# Patient Record
Sex: MALE | Race: WHITE | Employment: UNEMPLOYED | URBAN - METROPOLITAN AREA
[De-identification: names, ages, dates, MRNs, and addresses within clinical notes are randomized per-mention and may not be internally consistent; named-entity substitution may affect disease eponyms.]

---

## 2018-10-04 ENCOUNTER — HOSPITAL ENCOUNTER (EMERGENCY)
Age: 33
Discharge: PSYCHIATRIC OR SUBSTANCE USE FACILITY | End: 2018-10-04
Attending: EMERGENCY MEDICINE
Payer: MEDICARE

## 2018-10-04 VITALS
WEIGHT: 207 LBS | RESPIRATION RATE: 16 BRPM | SYSTOLIC BLOOD PRESSURE: 122 MMHG | BODY MASS INDEX: 26.56 KG/M2 | OXYGEN SATURATION: 98 % | HEART RATE: 73 BPM | HEIGHT: 74 IN | TEMPERATURE: 98.7 F | DIASTOLIC BLOOD PRESSURE: 86 MMHG

## 2018-10-04 DIAGNOSIS — R45.851 SUICIDAL IDEATION: Primary | ICD-10-CM

## 2018-10-04 LAB
ACETAMINOPHEN LEVEL: <5 UG/ML (ref 15–30)
ALBUMIN SERPL-MCNC: 4.4 GM/DL (ref 3.4–5)
ALCOHOL SCREEN SERUM: <0.01 %WT/VOL
ALP BLD-CCNC: 78 IU/L (ref 40–129)
ALT SERPL-CCNC: 18 U/L (ref 10–40)
AMPHETAMINES: NEGATIVE
ANION GAP SERPL CALCULATED.3IONS-SCNC: 12 MMOL/L (ref 4–16)
AST SERPL-CCNC: 14 IU/L (ref 15–37)
BACTERIA: NEGATIVE /HPF
BARBITURATE SCREEN URINE: NEGATIVE
BASOPHILS ABSOLUTE: 0 K/CU MM
BASOPHILS RELATIVE PERCENT: 0.7 % (ref 0–1)
BENZODIAZEPINE SCREEN, URINE: NEGATIVE
BILIRUB SERPL-MCNC: 0.6 MG/DL (ref 0–1)
BILIRUBIN URINE: NEGATIVE MG/DL
BLOOD, URINE: NEGATIVE
BUN BLDV-MCNC: 18 MG/DL (ref 6–23)
CALCIUM SERPL-MCNC: 9.3 MG/DL (ref 8.3–10.6)
CANNABINOID SCREEN URINE: NEGATIVE
CHLORIDE BLD-SCNC: 103 MMOL/L (ref 99–110)
CLARITY: CLEAR
CO2: 23 MMOL/L (ref 21–32)
COCAINE METABOLITE: NEGATIVE
COLOR: YELLOW
CREAT SERPL-MCNC: 1 MG/DL (ref 0.9–1.3)
DIFFERENTIAL TYPE: ABNORMAL
EOSINOPHILS ABSOLUTE: 0.1 K/CU MM
EOSINOPHILS RELATIVE PERCENT: 1.4 % (ref 0–3)
GFR AFRICAN AMERICAN: >60 ML/MIN/1.73M2
GFR NON-AFRICAN AMERICAN: >60 ML/MIN/1.73M2
GLUCOSE BLD-MCNC: 88 MG/DL (ref 70–99)
GLUCOSE, URINE: NEGATIVE MG/DL
HCT VFR BLD CALC: 48.5 % (ref 42–52)
HEMOGLOBIN: 17.1 GM/DL (ref 13.5–18)
IMMATURE NEUTROPHIL %: 0.2 % (ref 0–0.43)
KETONES, URINE: NEGATIVE MG/DL
LEUKOCYTE ESTERASE, URINE: NEGATIVE
LYMPHOCYTES ABSOLUTE: 1.8 K/CU MM
LYMPHOCYTES RELATIVE PERCENT: 31.3 % (ref 24–44)
MCH RBC QN AUTO: 32.1 PG (ref 27–31)
MCHC RBC AUTO-ENTMCNC: 35.3 % (ref 32–36)
MCV RBC AUTO: 91.2 FL (ref 78–100)
MONOCYTES ABSOLUTE: 0.6 K/CU MM
MONOCYTES RELATIVE PERCENT: 10.8 % (ref 0–4)
MUCUS: ABNORMAL HPF
NITRITE URINE, QUANTITATIVE: NEGATIVE
NUCLEATED RBC %: 0 %
OPIATES, URINE: NEGATIVE
OXYCODONE: NEGATIVE
PDW BLD-RTO: 11.4 % (ref 11.7–14.9)
PH, URINE: 5 (ref 5–8)
PHENCYCLIDINE, URINE: NEGATIVE
PLATELET # BLD: 294 K/CU MM (ref 140–440)
PMV BLD AUTO: 9.5 FL (ref 7.5–11.1)
POTASSIUM SERPL-SCNC: 4.3 MMOL/L (ref 3.5–5.1)
PROTEIN UA: NEGATIVE MG/DL
RBC # BLD: 5.32 M/CU MM (ref 4.6–6.2)
RBC URINE: 1 /HPF (ref 0–3)
SALICYLATE LEVEL: <0.3 MG/DL (ref 15–30)
SEGMENTED NEUTROPHILS ABSOLUTE COUNT: 3.2 K/CU MM
SEGMENTED NEUTROPHILS RELATIVE PERCENT: 55.6 % (ref 36–66)
SODIUM BLD-SCNC: 138 MMOL/L (ref 135–145)
SPECIFIC GRAVITY UA: 1.03 (ref 1–1.03)
SQUAMOUS EPITHELIAL: <1 /HPF
TOTAL IMMATURE NEUTOROPHIL: 0.01 K/CU MM
TOTAL NUCLEATED RBC: 0 K/CU MM
TOTAL PROTEIN: 6.7 GM/DL (ref 6.4–8.2)
TRICHOMONAS: ABNORMAL /HPF
UROBILINOGEN, URINE: NORMAL MG/DL (ref 0.2–1)
WBC # BLD: 5.7 K/CU MM (ref 4–10.5)
WBC UA: <1 /HPF (ref 0–2)

## 2018-10-04 PROCEDURE — 83721 ASSAY OF BLOOD LIPOPROTEIN: CPT

## 2018-10-04 PROCEDURE — G0480 DRUG TEST DEF 1-7 CLASSES: HCPCS

## 2018-10-04 PROCEDURE — 80061 LIPID PANEL: CPT

## 2018-10-04 PROCEDURE — 36415 COLL VENOUS BLD VENIPUNCTURE: CPT

## 2018-10-04 PROCEDURE — 81001 URINALYSIS AUTO W/SCOPE: CPT

## 2018-10-04 PROCEDURE — 80307 DRUG TEST PRSMV CHEM ANLYZR: CPT

## 2018-10-04 PROCEDURE — 85025 COMPLETE CBC W/AUTO DIFF WBC: CPT

## 2018-10-04 PROCEDURE — 80053 COMPREHEN METABOLIC PANEL: CPT

## 2018-10-04 PROCEDURE — 80320 DRUG SCREEN QUANTALCOHOLS: CPT

## 2018-10-04 PROCEDURE — 99285 EMERGENCY DEPT VISIT HI MDM: CPT

## 2018-10-06 LAB
CHOLESTEROL: 175 MG/DL
HDLC SERPL-MCNC: 31 MG/DL
LDL CHOLESTEROL DIRECT: 89 MG/DL
TRIGL SERPL-MCNC: 308 MG/DL

## 2018-11-11 ENCOUNTER — HOSPITAL ENCOUNTER (EMERGENCY)
Age: 33
Discharge: HOME | End: 2018-11-11
Payer: MEDICARE

## 2018-11-11 ENCOUNTER — APPOINTMENT (OUTPATIENT)
Dept: GENERAL RADIOLOGY | Age: 33
End: 2018-11-11
Payer: MEDICARE

## 2018-11-11 VITALS
TEMPERATURE: 98.6 F | RESPIRATION RATE: 17 BRPM | BODY MASS INDEX: 26.95 KG/M2 | HEART RATE: 64 BPM | OXYGEN SATURATION: 96 % | HEIGHT: 74 IN | DIASTOLIC BLOOD PRESSURE: 68 MMHG | WEIGHT: 210 LBS | SYSTOLIC BLOOD PRESSURE: 123 MMHG

## 2018-11-11 DIAGNOSIS — S69.91XA INJURY OF RIGHT WRIST, INITIAL ENCOUNTER: Primary | ICD-10-CM

## 2018-11-11 PROCEDURE — 6370000000 HC RX 637 (ALT 250 FOR IP): Performed by: PHYSICIAN ASSISTANT

## 2018-11-11 PROCEDURE — 73110 X-RAY EXAM OF WRIST: CPT

## 2018-11-11 PROCEDURE — 99283 EMERGENCY DEPT VISIT LOW MDM: CPT

## 2018-11-11 PROCEDURE — L3809 WHFO W/O JOINTS PRE OTS: HCPCS

## 2018-11-11 PROCEDURE — 73130 X-RAY EXAM OF HAND: CPT

## 2018-11-11 RX ORDER — NAPROXEN 250 MG/1
500 TABLET ORAL ONCE
Status: COMPLETED | OUTPATIENT
Start: 2018-11-11 | End: 2018-11-11

## 2018-11-11 RX ORDER — NAPROXEN 500 MG/1
500 TABLET ORAL 2 TIMES DAILY PRN
Qty: 30 TABLET | Refills: 0 | Status: SHIPPED | OUTPATIENT
Start: 2018-11-11 | End: 2019-03-26

## 2018-11-11 RX ADMIN — NAPROXEN 500 MG: 250 TABLET ORAL at 11:15

## 2018-11-11 ASSESSMENT — PAIN SCALES - GENERAL: PAINLEVEL_OUTOF10: 6

## 2018-11-11 ASSESSMENT — PAIN DESCRIPTION - LOCATION: LOCATION: WRIST

## 2018-11-11 ASSESSMENT — PAIN DESCRIPTION - ORIENTATION: ORIENTATION: RIGHT

## 2018-11-11 NOTE — ED PROVIDER NOTES
and spelling, as well as words and phrases that may be inappropriate. If there are any questions or concerns please feel free to contact the dictating provider for clarification.        Percy Bernstein PA-C  11/11/18 0964

## 2018-11-27 ENCOUNTER — HOSPITAL ENCOUNTER (OUTPATIENT)
Age: 33
Setting detail: SPECIMEN
Discharge: HOME | End: 2018-11-27
Payer: MEDICARE

## 2018-11-27 LAB
ALBUMIN SERPL-MCNC: 3.9 GM/DL (ref 3.4–5)
ALP BLD-CCNC: 61 IU/L (ref 40–128)
ALT SERPL-CCNC: 38 U/L (ref 10–40)
AMPHETAMINES: NEGATIVE
ANION GAP SERPL CALCULATED.3IONS-SCNC: 11 MMOL/L (ref 4–16)
AST SERPL-CCNC: 22 IU/L (ref 15–37)
BARBITURATE SCREEN URINE: NEGATIVE
BASOPHILS ABSOLUTE: 0 K/CU MM
BASOPHILS RELATIVE PERCENT: 0.5 % (ref 0–1)
BENZODIAZEPINE SCREEN, URINE: NEGATIVE
BILIRUB SERPL-MCNC: 0.8 MG/DL (ref 0–1)
BUN BLDV-MCNC: 15 MG/DL (ref 6–23)
CALCIUM SERPL-MCNC: 8.6 MG/DL (ref 8.3–10.6)
CANNABINOID SCREEN URINE: ABNORMAL
CHLORIDE BLD-SCNC: 104 MMOL/L (ref 99–110)
CO2: 24 MMOL/L (ref 21–32)
COCAINE METABOLITE: NEGATIVE
CREAT SERPL-MCNC: 1.1 MG/DL (ref 0.9–1.3)
DIFFERENTIAL TYPE: ABNORMAL
EOSINOPHILS ABSOLUTE: 0.1 K/CU MM
EOSINOPHILS RELATIVE PERCENT: 2.5 % (ref 0–3)
GFR AFRICAN AMERICAN: >60 ML/MIN/1.73M2
GFR NON-AFRICAN AMERICAN: >60 ML/MIN/1.73M2
GLUCOSE BLD-MCNC: 90 MG/DL (ref 70–99)
HCT VFR BLD CALC: 51 % (ref 42–52)
HEMOGLOBIN: 16.6 GM/DL (ref 13.5–18)
IMMATURE NEUTROPHIL %: 0.2 % (ref 0–0.43)
LYMPHOCYTES ABSOLUTE: 1.8 K/CU MM
LYMPHOCYTES RELATIVE PERCENT: 32.4 % (ref 24–44)
MCH RBC QN AUTO: 30.4 PG (ref 27–31)
MCHC RBC AUTO-ENTMCNC: 32.5 % (ref 32–36)
MCV RBC AUTO: 93.4 FL (ref 78–100)
MONOCYTES ABSOLUTE: 0.5 K/CU MM
MONOCYTES RELATIVE PERCENT: 9.6 % (ref 0–4)
NUCLEATED RBC %: 0 %
OPIATES, URINE: NEGATIVE
OXYCODONE: NEGATIVE
PDW BLD-RTO: 11.7 % (ref 11.7–14.9)
PHENCYCLIDINE, URINE: NEGATIVE
PLATELET # BLD: 287 K/CU MM (ref 140–440)
PMV BLD AUTO: 9.8 FL (ref 7.5–11.1)
POTASSIUM SERPL-SCNC: 4.6 MMOL/L (ref 3.5–5.1)
RBC # BLD: 5.46 M/CU MM (ref 4.6–6.2)
SEGMENTED NEUTROPHILS ABSOLUTE COUNT: 3.1 K/CU MM
SEGMENTED NEUTROPHILS RELATIVE PERCENT: 54.8 % (ref 36–66)
SODIUM BLD-SCNC: 139 MMOL/L (ref 135–145)
TOTAL IMMATURE NEUTOROPHIL: 0.01 K/CU MM
TOTAL NUCLEATED RBC: 0 K/CU MM
TOTAL PROTEIN: 5.7 GM/DL (ref 6.4–8.2)
WBC # BLD: 5.6 K/CU MM (ref 4–10.5)

## 2018-11-27 PROCEDURE — 85025 COMPLETE CBC W/AUTO DIFF WBC: CPT

## 2018-11-27 PROCEDURE — 36415 COLL VENOUS BLD VENIPUNCTURE: CPT

## 2018-11-27 PROCEDURE — 80053 COMPREHEN METABOLIC PANEL: CPT

## 2018-11-27 PROCEDURE — 80307 DRUG TEST PRSMV CHEM ANLYZR: CPT

## 2018-12-08 ENCOUNTER — HOSPITAL ENCOUNTER (EMERGENCY)
Age: 33
Discharge: HOME | End: 2018-12-08
Attending: EMERGENCY MEDICINE
Payer: MEDICARE

## 2018-12-08 ENCOUNTER — APPOINTMENT (OUTPATIENT)
Dept: GENERAL RADIOLOGY | Age: 33
End: 2018-12-08
Payer: MEDICARE

## 2018-12-08 VITALS
BODY MASS INDEX: 27.08 KG/M2 | WEIGHT: 211 LBS | HEIGHT: 74 IN | OXYGEN SATURATION: 99 % | DIASTOLIC BLOOD PRESSURE: 84 MMHG | TEMPERATURE: 98.1 F | SYSTOLIC BLOOD PRESSURE: 123 MMHG | RESPIRATION RATE: 16 BRPM | HEART RATE: 94 BPM

## 2018-12-08 DIAGNOSIS — R05.9 COUGH: ICD-10-CM

## 2018-12-08 DIAGNOSIS — J02.9 SORE THROAT: Primary | ICD-10-CM

## 2018-12-08 DIAGNOSIS — J06.9 UPPER RESPIRATORY TRACT INFECTION, UNSPECIFIED TYPE: ICD-10-CM

## 2018-12-08 PROCEDURE — 87430 STREP A AG IA: CPT

## 2018-12-08 PROCEDURE — 87081 CULTURE SCREEN ONLY: CPT

## 2018-12-08 PROCEDURE — 99283 EMERGENCY DEPT VISIT LOW MDM: CPT

## 2018-12-08 PROCEDURE — 71045 X-RAY EXAM CHEST 1 VIEW: CPT

## 2018-12-08 RX ORDER — AZITHROMYCIN 250 MG/1
TABLET, FILM COATED ORAL
Qty: 1 PACKET | Refills: 0 | Status: SHIPPED | OUTPATIENT
Start: 2018-12-08 | End: 2018-12-18

## 2018-12-08 RX ORDER — BENZONATATE 100 MG/1
100 CAPSULE ORAL 3 TIMES DAILY PRN
Qty: 10 CAPSULE | Refills: 0 | Status: SHIPPED | OUTPATIENT
Start: 2018-12-08 | End: 2018-12-15

## 2018-12-08 ASSESSMENT — ENCOUNTER SYMPTOMS
COUGH: 1
ALLERGIC/IMMUNOLOGIC NEGATIVE: 1
GASTROINTESTINAL NEGATIVE: 1
EYES NEGATIVE: 1
SORE THROAT: 1

## 2018-12-08 ASSESSMENT — PAIN SCALES - GENERAL: PAINLEVEL_OUTOF10: 4

## 2018-12-08 ASSESSMENT — PAIN DESCRIPTION - PAIN TYPE: TYPE: ACUTE PAIN

## 2018-12-08 ASSESSMENT — PAIN DESCRIPTION - LOCATION: LOCATION: THROAT

## 2018-12-08 NOTE — ED PROVIDER NOTES
[facility]      TRIAGE CHIEF COMPLAINT:   Pharyngitis      Chitina:  Castro Hdz is a 34 y.o. male that presents with complaint of sore throat, cough, congestion. Patient has been sick for the past couple days. History of strep throat he is at work and is worried about being contagious. He denies any fevers headache chest pain shortness of breath bowel pain nausea vomiting diarrhea. His wife is sick with similar symptoms. Denies any other questions or concerns no ear pain. Vaccines up-to-date. REVIEW OF SYSTEMS:  At least 10 systems reviewed and otherwise acutely negative except as in the [address] Review of Systems   Constitutional: Negative. HENT: Positive for congestion and sore throat. Eyes: Negative. Respiratory: Positive for cough. Cardiovascular: Negative. Gastrointestinal: Negative. Endocrine: Negative. Genitourinary: Negative. Musculoskeletal: Negative. Skin: Negative. Allergic/Immunologic: Negative. Neurological: Negative. Hematological: Negative. Psychiatric/Behavioral: Negative. All other systems reviewed and are negative. Past Medical History:   Diagnosis Date    Anxiety     Bipolar 2 disorder ([facility] [address]     Depression     Seizures ([facility] [address]      Past Surgical History:   Procedure Laterality Date    TONSILLECTOMY AND ADENOIDECTOMY      TYMPANOSTOMY TUBE PLACEMENT       History reviewed. No pertinent family history. Social History     Social History    Marital status:      Spouse name: N/A    Number of children: N/A    Years of education: N/A     Occupational History    Not on file.      Social History Main Topics    Smoking status: Former Smoker    Smokeless tobacco: Never Used    Alcohol use No    Drug use: Yes     Types: Marijuana    Sexual activity: Not on file     Other Topics Concern    Not on file     Social History Narrative    No narrative on file     No current facility-administered medications for He does not appear ill. No distress. HENT:   Head: Normocephalic and atraumatic. Right Ear: External ear normal.   Left Ear: External ear normal.   Mouth/Throat: Uvula is midline and mucous membranes are normal. No trismus in the jaw. No uvula swelling. Posterior oropharyngeal erythema present. No oropharyngeal exudate, posterior oropharyngeal edema or tonsillar abscesses. Eyes: Pupils are equal, round, and reactive to light. Conjunctivae and EOM are normal. Right eye exhibits no discharge. Left eye exhibits no discharge. No scleral icterus. Neck: Normal range of motion, full passive range of motion without pain and phonation normal. No JVD present. No neck rigidity. No edema, no erythema and normal range of motion present. Cardiovascular: Normal rate, regular rhythm, normal heart sounds and intact distal pulses. Exam reveals no gallop and no friction rub. No murmur heard. Pulmonary/Chest: Effort normal and breath sounds normal. No stridor. No respiratory distress. He has no wheezes. He has no rales. Abdominal: Soft. Bowel sounds are normal. He exhibits no distension and no mass. There is no tenderness. There is no rigidity, no rebound, no guarding, no tenderness at McBurney's point and negative Roche's sign. Musculoskeletal: Normal range of motion. He exhibits no edema, tenderness or deformity. Neurological: He is alert and oriented to person, place, and time. He has normal strength. He is not disoriented. He displays no atrophy and no tremor. No cranial nerve deficit or sensory deficit. He exhibits normal muscle tone. He displays no seizure activity. Coordination normal. GCS eye subscore is 4. GCS verbal subscore is 5. GCS motor subscore is 6. Skin: Skin is warm. No rash noted. He is not diaphoretic. No erythema. No pallor. Psychiatric: He has a normal mood and affect. His behavior is normal. Judgment and thought content normal.   Nursing note and vitals reviewed.         I have reviewed

## 2018-12-10 LAB
CULTURE: NORMAL
Lab: NORMAL
REPORT STATUS: NORMAL
SPECIMEN: NORMAL
STREP A DIRECT SCREEN: NEGATIVE

## 2019-03-26 ENCOUNTER — APPOINTMENT (OUTPATIENT)
Dept: GENERAL RADIOLOGY | Age: 34
End: 2019-03-26
Payer: MEDICARE

## 2019-03-26 ENCOUNTER — HOSPITAL ENCOUNTER (EMERGENCY)
Age: 34
Discharge: HOME | End: 2019-03-26
Payer: MEDICARE

## 2019-03-26 VITALS
BODY MASS INDEX: 28.49 KG/M2 | RESPIRATION RATE: 16 BRPM | TEMPERATURE: 97.9 F | SYSTOLIC BLOOD PRESSURE: 133 MMHG | DIASTOLIC BLOOD PRESSURE: 76 MMHG | OXYGEN SATURATION: 98 % | WEIGHT: 215 LBS | HEART RATE: 73 BPM | HEIGHT: 73 IN

## 2019-03-26 DIAGNOSIS — M54.16 LUMBAR BACK PAIN WITH RADICULOPATHY AFFECTING RIGHT LOWER EXTREMITY: Primary | ICD-10-CM

## 2019-03-26 PROCEDURE — 6360000002 HC RX W HCPCS: Performed by: PHYSICIAN ASSISTANT

## 2019-03-26 PROCEDURE — 96372 THER/PROPH/DIAG INJ SC/IM: CPT

## 2019-03-26 PROCEDURE — 99283 EMERGENCY DEPT VISIT LOW MDM: CPT

## 2019-03-26 PROCEDURE — 72100 X-RAY EXAM L-S SPINE 2/3 VWS: CPT

## 2019-03-26 PROCEDURE — 6370000000 HC RX 637 (ALT 250 FOR IP): Performed by: PHYSICIAN ASSISTANT

## 2019-03-26 RX ORDER — LIDOCAINE 50 MG/G
1 PATCH TOPICAL DAILY
Qty: 10 PATCH | Refills: 0 | Status: SHIPPED | OUTPATIENT
Start: 2019-03-26 | End: 2019-08-20

## 2019-03-26 RX ORDER — ORPHENADRINE CITRATE 30 MG/ML
60 INJECTION INTRAMUSCULAR; INTRAVENOUS ONCE
Status: COMPLETED | OUTPATIENT
Start: 2019-03-26 | End: 2019-03-26

## 2019-03-26 RX ORDER — CYCLOBENZAPRINE HCL 10 MG
10 TABLET ORAL 3 TIMES DAILY PRN
Qty: 15 TABLET | Refills: 0 | Status: SHIPPED | OUTPATIENT
Start: 2019-03-26 | End: 2019-04-05

## 2019-03-26 RX ORDER — LIDOCAINE 4 G/G
1 PATCH TOPICAL DAILY
Status: DISCONTINUED | OUTPATIENT
Start: 2019-03-26 | End: 2019-03-26 | Stop reason: HOSPADM

## 2019-03-26 RX ORDER — KETOROLAC TROMETHAMINE 30 MG/ML
30 INJECTION, SOLUTION INTRAMUSCULAR; INTRAVENOUS ONCE
Status: COMPLETED | OUTPATIENT
Start: 2019-03-26 | End: 2019-03-26

## 2019-03-26 RX ORDER — METHYLPREDNISOLONE 4 MG/1
TABLET ORAL
Qty: 21 TABLET | Refills: 0 | Status: SHIPPED | OUTPATIENT
Start: 2019-03-26 | End: 2019-08-20 | Stop reason: ALTCHOICE

## 2019-03-26 RX ORDER — NAPROXEN 500 MG/1
500 TABLET ORAL 2 TIMES DAILY PRN
Qty: 20 TABLET | Refills: 0 | Status: SHIPPED | OUTPATIENT
Start: 2019-03-26 | End: 2019-08-20 | Stop reason: ALTCHOICE

## 2019-03-26 RX ADMIN — ORPHENADRINE CITRATE 60 MG: 30 INJECTION INTRAMUSCULAR; INTRAVENOUS at 12:14

## 2019-03-26 RX ADMIN — KETOROLAC TROMETHAMINE 30 MG: 30 INJECTION, SOLUTION INTRAMUSCULAR; INTRAVENOUS at 12:14

## 2019-03-26 ASSESSMENT — PAIN DESCRIPTION - PAIN TYPE: TYPE: ACUTE PAIN

## 2019-03-26 ASSESSMENT — PAIN SCALES - GENERAL
PAINLEVEL_OUTOF10: 10
PAINLEVEL_OUTOF10: 10

## 2019-03-26 ASSESSMENT — PAIN DESCRIPTION - LOCATION: LOCATION: BACK

## 2019-05-10 ENCOUNTER — HOSPITAL ENCOUNTER (EMERGENCY)
Age: 34
Discharge: HOME | End: 2019-05-10
Payer: MEDICARE

## 2019-05-10 ENCOUNTER — APPOINTMENT (OUTPATIENT)
Dept: GENERAL RADIOLOGY | Age: 34
End: 2019-05-10
Payer: MEDICARE

## 2019-05-10 VITALS
OXYGEN SATURATION: 98 % | BODY MASS INDEX: 26.44 KG/M2 | HEART RATE: 58 BPM | TEMPERATURE: 97.5 F | RESPIRATION RATE: 16 BRPM | DIASTOLIC BLOOD PRESSURE: 77 MMHG | SYSTOLIC BLOOD PRESSURE: 120 MMHG | WEIGHT: 206 LBS | HEIGHT: 74 IN

## 2019-05-10 DIAGNOSIS — J06.9 VIRAL URI WITH COUGH: Primary | ICD-10-CM

## 2019-05-10 DIAGNOSIS — R11.0 NAUSEA: ICD-10-CM

## 2019-05-10 PROCEDURE — 71046 X-RAY EXAM CHEST 2 VIEWS: CPT

## 2019-05-10 PROCEDURE — 99283 EMERGENCY DEPT VISIT LOW MDM: CPT

## 2019-05-10 PROCEDURE — 6370000000 HC RX 637 (ALT 250 FOR IP): Performed by: PHYSICIAN ASSISTANT

## 2019-05-10 RX ORDER — ONDANSETRON 4 MG/1
4 TABLET, ORALLY DISINTEGRATING ORAL ONCE
Status: COMPLETED | OUTPATIENT
Start: 2019-05-10 | End: 2019-05-10

## 2019-05-10 RX ORDER — GUAIFENESIN/DEXTROMETHORPHAN 100-10MG/5
5 SYRUP ORAL 3 TIMES DAILY PRN
Qty: 120 ML | Refills: 0 | Status: SHIPPED | OUTPATIENT
Start: 2019-05-10 | End: 2019-05-20

## 2019-05-10 RX ORDER — ONDANSETRON 4 MG/1
4 TABLET, FILM COATED ORAL EVERY 8 HOURS PRN
Qty: 10 TABLET | Refills: 0 | Status: SHIPPED | OUTPATIENT
Start: 2019-05-10 | End: 2019-08-20 | Stop reason: ALTCHOICE

## 2019-05-10 RX ORDER — ONDANSETRON 2 MG/ML
4 INJECTION INTRAMUSCULAR; INTRAVENOUS EVERY 30 MIN PRN
Status: DISCONTINUED | OUTPATIENT
Start: 2019-05-10 | End: 2019-05-10

## 2019-05-10 RX ORDER — GUAIFENESIN/DEXTROMETHORPHAN 100-10MG/5
10 SYRUP ORAL ONCE
Status: COMPLETED | OUTPATIENT
Start: 2019-05-10 | End: 2019-05-10

## 2019-05-10 RX ADMIN — ONDANSETRON 4 MG: 4 TABLET, ORALLY DISINTEGRATING ORAL at 08:36

## 2019-05-10 RX ADMIN — GUAIFENESIN AND DEXTROMETHORPHAN 10 ML: 100; 10 SYRUP ORAL at 08:37

## 2019-05-10 NOTE — ED PROVIDER NOTES
eMERGENCY dEPARTMENT eNCOUnter        [address] [facility]    Chief Complaint   Patient presents with    Cough    Pharyngitis    Nasal Congestion    Nausea       HPI    Dmitry Oakley is a 28 y.o. male who presents with cough, sore throat, nasal congestion, nausea. Onset was 5 days ago for cough and nasal congestion. States the nausea began last night. He reports that he is coughing up yellow-green sputum. He has not had any vomiting. Reports entire family has been sick with similar illness, both children have had a stomach virus, daughter was sick first and she is now resolved. Denies fevers. No history of asthma. Does not smoke. Denies chest pain or shortness of breath. No abdominal pain. REVIEW OF SYSTEMS    See HPI for further details. Review of systems otherwise negative. Constitutional:  no fever. HENT:  no headache, no earache, + nasal congestion, no sinus pressure, + sore throat  Respiratory:  + cough, no sob. Cardiovascular:  Denies chest pain. GI:  Denies vomiting, diarrhea.  + nausea  Musculoskeletal:  Denies edema, tenderness. Integument:  Denies rashes. PAST MEDICAL HISTORY    Past Medical History:   Diagnosis Date    Anxiety     Bipolar 2 disorder ([facility] [address]     Depression     Seizures ([facility] [address]        SURGICAL HISTORY    Past Surgical History:   Procedure Laterality Date    TONSILLECTOMY AND ADENOIDECTOMY      TYMPANOSTOMY TUBE PLACEMENT         CURRENT MEDICATIONS    Current Outpatient Rx   Medication Sig Dispense Refill    guaiFENesin-dextromethorphan (ROBITUSSIN DM) 100-10 MG/5ML syrup Take 5 mLs by mouth 3 times daily as needed for Cough 120 mL 0    ondansetron (ZOFRAN) 4 MG tablet Take 1 tablet by mouth every 8 hours as needed for Nausea 10 tablet 0    naproxen (NAPROSYN) 500 MG tablet Take 1 tablet by mouth 2 times daily as needed for Pain 20 tablet 0    lidocaine (LIDODERM) 5 % Place 1 patch onto the skin daily 12 hours on, 12 hours off.  10 patch 0    methylPREDNISolone (MEDROL, RICKY,) 4 MG tablet Take by mouth as directed on package 21 tablet 0    ARIPiprazole (ABILIFY PO) Take by mouth         ALLERGIES    Allergies   Allergen Reactions    Amoxicillin Diarrhea       FAMILY HISTORY    History reviewed. No pertinent family history. SOCIAL HISTORY    Social History     Socioeconomic History    Marital status:      Spouse name: None    Number of children: None    Years of education: None    Highest education level: None   Occupational History    None   Social Needs    Financial resource strain: None    Food insecurity:     Worry: None     Inability: None    Transportation needs:     Medical: None     Non-medical: None   Tobacco Use    Smoking status: Former Smoker    Smokeless tobacco: Never Used   Substance and Sexual Activity    Alcohol use: No    Drug use: Not Currently     Types: Marijuana    Sexual activity: None   Lifestyle    Physical activity:     Days per week: None     Minutes per session: None    Stress: None   Relationships    Social connections:     Talks on phone: None     Gets together: None     Attends [employer] service: None     Active member of club or organization: None     Attends meetings of clubs or organizations: None     Relationship status: None    Intimate partner violence:     Fear of current or ex partner: None     Emotionally abused: None     Physically abused: None     Forced sexual activity: None   Other Topics Concern    None   Social History Narrative    None       PHYSICAL EXAM    VITAL SIGNS: /77   Pulse 58   Temp 97.5 °F (36.4 °C)   Resp 16   Ht 6' 2\" (1.88 m)   Wt 206 lb (93.4 kg)   SpO2 98%   BMI 26.45 kg/m²   GENERAL:  Well-developed, well-nourished, no acute distress  EYES:   PERRL, EOMI. Conjunctiva normal.  HENT:  NC/AT. External ears normal, canals patent and nonerythematous bilaterally, TMs intact and noninjected bilaterally. Nares patent.   No sinus tenderness to

## 2019-05-10 NOTE — ED TRIAGE NOTES
Pt presents to the ER with complaints of nasal congestion, sore throat, cough (yellow-green sputum), nausea; pt states that his whole family has been sick; when pt eats or drinks something he gets nauseated but does not vomit;

## 2019-05-13 ENCOUNTER — APPOINTMENT (OUTPATIENT)
Dept: CT IMAGING | Age: 34
End: 2019-05-13
Payer: MEDICARE

## 2019-05-13 ENCOUNTER — HOSPITAL ENCOUNTER (EMERGENCY)
Age: 34
Discharge: HOME | End: 2019-05-13
Attending: EMERGENCY MEDICINE
Payer: MEDICARE

## 2019-05-13 VITALS
OXYGEN SATURATION: 97 % | HEART RATE: 82 BPM | RESPIRATION RATE: 16 BRPM | SYSTOLIC BLOOD PRESSURE: 105 MMHG | HEIGHT: 75 IN | TEMPERATURE: 98.1 F | DIASTOLIC BLOOD PRESSURE: 59 MMHG | WEIGHT: 205 LBS | BODY MASS INDEX: 25.49 KG/M2

## 2019-05-13 DIAGNOSIS — R19.7 NAUSEA VOMITING AND DIARRHEA: Primary | ICD-10-CM

## 2019-05-13 DIAGNOSIS — R11.2 NAUSEA VOMITING AND DIARRHEA: Primary | ICD-10-CM

## 2019-05-13 DIAGNOSIS — E87.6 HYPOKALEMIA: ICD-10-CM

## 2019-05-13 DIAGNOSIS — R10.9 ABDOMINAL PAIN, UNSPECIFIED ABDOMINAL LOCATION: ICD-10-CM

## 2019-05-13 LAB
ALBUMIN SERPL-MCNC: 3.7 GM/DL (ref 3.4–5)
ALP BLD-CCNC: 77 IU/L (ref 40–129)
ALT SERPL-CCNC: 20 U/L (ref 10–40)
ANION GAP SERPL CALCULATED.3IONS-SCNC: 11 MMOL/L (ref 4–16)
AST SERPL-CCNC: 21 IU/L (ref 15–37)
BACTERIA: NEGATIVE /HPF
BASOPHILS ABSOLUTE: 0 K/CU MM
BASOPHILS RELATIVE PERCENT: 0.4 % (ref 0–1)
BILIRUB SERPL-MCNC: 0.4 MG/DL (ref 0–1)
BILIRUBIN URINE: NEGATIVE MG/DL
BLOOD, URINE: NEGATIVE
BUN BLDV-MCNC: 11 MG/DL (ref 6–23)
CALCIUM OXALATE CRYSTALS: ABNORMAL /HPF
CALCIUM SERPL-MCNC: 8.4 MG/DL (ref 8.3–10.6)
CHLORIDE BLD-SCNC: 102 MMOL/L (ref 99–110)
CLARITY: CLEAR
CO2: 24 MMOL/L (ref 21–32)
COLOR: ABNORMAL
CREAT SERPL-MCNC: 1.1 MG/DL (ref 0.9–1.3)
DIFFERENTIAL TYPE: ABNORMAL
EOSINOPHILS ABSOLUTE: 0 K/CU MM
EOSINOPHILS RELATIVE PERCENT: 0.4 % (ref 0–3)
GFR AFRICAN AMERICAN: >60 ML/MIN/1.73M2
GFR NON-AFRICAN AMERICAN: >60 ML/MIN/1.73M2
GLUCOSE BLD-MCNC: 116 MG/DL (ref 70–99)
GLUCOSE, URINE: NEGATIVE MG/DL
HCT VFR BLD CALC: 48.6 % (ref 42–52)
HEMOGLOBIN: 16.6 GM/DL (ref 13.5–18)
IMMATURE NEUTROPHIL %: 0.4 % (ref 0–0.43)
KETONES, URINE: ABNORMAL MG/DL
LEUKOCYTE ESTERASE, URINE: NEGATIVE
LIPASE: 22 IU/L (ref 13–60)
LYMPHOCYTES ABSOLUTE: 1.6 K/CU MM
LYMPHOCYTES RELATIVE PERCENT: 23.9 % (ref 24–44)
MCH RBC QN AUTO: 29.9 PG (ref 27–31)
MCHC RBC AUTO-ENTMCNC: 34.2 % (ref 32–36)
MCV RBC AUTO: 87.4 FL (ref 78–100)
MONOCYTES ABSOLUTE: 0.7 K/CU MM
MONOCYTES RELATIVE PERCENT: 9.5 % (ref 0–4)
MUCUS: ABNORMAL HPF
NITRITE URINE, QUANTITATIVE: NEGATIVE
NUCLEATED RBC %: 0 %
PDW BLD-RTO: 11.7 % (ref 11.7–14.9)
PH, URINE: 5 (ref 5–8)
PLATELET # BLD: 323 K/CU MM (ref 140–440)
PMV BLD AUTO: 9.1 FL (ref 7.5–11.1)
POTASSIUM SERPL-SCNC: 3.3 MMOL/L (ref 3.5–5.1)
PROTEIN UA: 30 MG/DL
RBC # BLD: 5.56 M/CU MM (ref 4.6–6.2)
RBC URINE: ABNORMAL /HPF (ref 0–3)
SEGMENTED NEUTROPHILS ABSOLUTE COUNT: 4.4 K/CU MM
SEGMENTED NEUTROPHILS RELATIVE PERCENT: 65.4 % (ref 36–66)
SODIUM BLD-SCNC: 137 MMOL/L (ref 135–145)
SPECIFIC GRAVITY UA: 1.03 (ref 1–1.03)
TOTAL IMMATURE NEUTOROPHIL: 0.03 K/CU MM
TOTAL NUCLEATED RBC: 0 K/CU MM
TOTAL PROTEIN: 6.3 GM/DL (ref 6.4–8.2)
TRICHOMONAS: ABNORMAL /HPF
UROBILINOGEN, URINE: NORMAL MG/DL (ref 0.2–1)
WBC # BLD: 6.8 K/CU MM (ref 4–10.5)
WBC UA: 2 /HPF (ref 0–2)

## 2019-05-13 PROCEDURE — 2580000003 HC RX 258: Performed by: PHYSICIAN ASSISTANT

## 2019-05-13 PROCEDURE — 6370000000 HC RX 637 (ALT 250 FOR IP): Performed by: PHYSICIAN ASSISTANT

## 2019-05-13 PROCEDURE — 85025 COMPLETE CBC W/AUTO DIFF WBC: CPT

## 2019-05-13 PROCEDURE — 99284 EMERGENCY DEPT VISIT MOD MDM: CPT

## 2019-05-13 PROCEDURE — 36415 COLL VENOUS BLD VENIPUNCTURE: CPT

## 2019-05-13 PROCEDURE — 96372 THER/PROPH/DIAG INJ SC/IM: CPT

## 2019-05-13 PROCEDURE — 6360000004 HC RX CONTRAST MEDICATION: Performed by: PHYSICIAN ASSISTANT

## 2019-05-13 PROCEDURE — 74177 CT ABD & PELVIS W/CONTRAST: CPT

## 2019-05-13 PROCEDURE — 96374 THER/PROPH/DIAG INJ IV PUSH: CPT

## 2019-05-13 PROCEDURE — 83690 ASSAY OF LIPASE: CPT

## 2019-05-13 PROCEDURE — 6360000002 HC RX W HCPCS: Performed by: PHYSICIAN ASSISTANT

## 2019-05-13 PROCEDURE — 81001 URINALYSIS AUTO W/SCOPE: CPT

## 2019-05-13 PROCEDURE — 80053 COMPREHEN METABOLIC PANEL: CPT

## 2019-05-13 RX ORDER — LOPERAMIDE HYDROCHLORIDE 2 MG/1
2 CAPSULE ORAL 4 TIMES DAILY PRN
Qty: 20 CAPSULE | Refills: 0 | Status: SHIPPED | OUTPATIENT
Start: 2019-05-13 | End: 2019-05-13

## 2019-05-13 RX ORDER — PROCHLORPERAZINE EDISYLATE 5 MG/ML
10 INJECTION INTRAMUSCULAR; INTRAVENOUS ONCE
Status: COMPLETED | OUTPATIENT
Start: 2019-05-13 | End: 2019-05-13

## 2019-05-13 RX ORDER — PROMETHAZINE HYDROCHLORIDE 25 MG/1
25 TABLET ORAL EVERY 6 HOURS PRN
Qty: 8 TABLET | Refills: 0 | Status: SHIPPED | OUTPATIENT
Start: 2019-05-13 | End: 2019-05-20

## 2019-05-13 RX ORDER — DICYCLOMINE HYDROCHLORIDE 10 MG/1
20 CAPSULE ORAL EVERY 6 HOURS PRN
Qty: 20 CAPSULE | Refills: 0 | Status: SHIPPED | OUTPATIENT
Start: 2019-05-13 | End: 2019-08-20 | Stop reason: ALTCHOICE

## 2019-05-13 RX ORDER — PROMETHAZINE HYDROCHLORIDE 25 MG/ML
25 INJECTION, SOLUTION INTRAMUSCULAR; INTRAVENOUS ONCE
Status: DISCONTINUED | OUTPATIENT
Start: 2019-05-13 | End: 2019-05-13

## 2019-05-13 RX ORDER — 0.9 % SODIUM CHLORIDE 0.9 %
1000 INTRAVENOUS SOLUTION INTRAVENOUS ONCE
Status: COMPLETED | OUTPATIENT
Start: 2019-05-13 | End: 2019-05-13

## 2019-05-13 RX ORDER — DICYCLOMINE HYDROCHLORIDE 10 MG/ML
20 INJECTION INTRAMUSCULAR ONCE
Status: COMPLETED | OUTPATIENT
Start: 2019-05-13 | End: 2019-05-13

## 2019-05-13 RX ORDER — POTASSIUM CHLORIDE 20 MEQ/1
40 TABLET, EXTENDED RELEASE ORAL ONCE
Status: COMPLETED | OUTPATIENT
Start: 2019-05-13 | End: 2019-05-13

## 2019-05-13 RX ADMIN — DICYCLOMINE HYDROCHLORIDE 20 MG: 20 INJECTION, SOLUTION INTRAMUSCULAR at 18:31

## 2019-05-13 RX ADMIN — PROCHLORPERAZINE EDISYLATE 10 MG: 5 INJECTION INTRAMUSCULAR; INTRAVENOUS at 18:57

## 2019-05-13 RX ADMIN — POTASSIUM CHLORIDE 40 MEQ: 20 TABLET, EXTENDED RELEASE ORAL at 18:56

## 2019-05-13 RX ADMIN — SODIUM CHLORIDE 1000 ML: 9 INJECTION, SOLUTION INTRAVENOUS at 18:57

## 2019-05-13 RX ADMIN — IOPAMIDOL 80 ML: 755 INJECTION, SOLUTION INTRAVENOUS at 19:16

## 2019-05-13 ASSESSMENT — PAIN DESCRIPTION - DESCRIPTORS: DESCRIPTORS: TIGHTNESS

## 2019-05-13 ASSESSMENT — PAIN DESCRIPTION - PAIN TYPE: TYPE: ACUTE PAIN

## 2019-05-13 ASSESSMENT — PAIN DESCRIPTION - LOCATION: LOCATION: ABDOMEN

## 2019-05-13 ASSESSMENT — PAIN DESCRIPTION - ORIENTATION: ORIENTATION: MID;UPPER

## 2019-05-13 ASSESSMENT — PAIN SCALES - GENERAL: PAINLEVEL_OUTOF10: 10

## 2019-05-13 NOTE — ED TRIAGE NOTES
Pt c/o epigastric abd pain, vomiting, and diarrhea. Pt reports he was seen here Friday for vomiting and diarrhea. Pt states abd pain is worse and has blood in his stool describes as red blood. Pt also c/o feeling lightheaded. Pt noted to be hyperventilating in triage. Pt encouraged to take slow deep breaths.

## 2019-05-13 NOTE — ED PROVIDER NOTES
Patient Identification  Romeo Edwards is a 28 y.o. male    Chief Complaint  Abdominal Pain; Emesis; and Rectal Bleeding (bright red blood)      HPI  (History provided by patient)  This is a 28 y.o. male who was brought in by self for chief complaint of abdominal pain, NVD, rectal bleeding. Onset was approximately 4 days ago. Patient states symptoms were initially more respiratory with a cough and sore throat and nasal congestion which have improved. Developed vomiting and diarrhea. Has also been having diffuse abdominal pain for the last 3 days, 10 out of 10, sharp, constant. .  States he's been unable to eat or drink anything. Notes that he has been having frequent liquid yellow diarrhea. Notes that last episode of diarrhea had a small amount of blood in it. Reports subjective fevers and chills at home. Remainder family sick with stomach virus and had been improving but patient continues to worsen. REVIEW OF SYSTEMS    Constitutional:  + subjective fever, chills  HENT:  Denies ear pain.  + ST, nasal congestion  Eyes: Denies vision changes, eye pain  Cardiovascular:  Denies chest pain, syncope  Respiratory:  Denies shortness of breath. + cough   GI:  + abdominal pain, nausea, vomiting, diarrhea  :  Denies dysuria, discharge  Musculoskeletal:  + body aches. Skin:  Denies rash, pruritis  Neurologic:  Denies headache, focal weakness, or sensory changes     See HPI and nursing notes for additional information     I have reviewed the following nursing documentation:  Allergies: Allergies   Allergen Reactions    Amoxicillin Diarrhea       Past medical history:  has a past medical history of Anxiety, Bipolar 2 disorder ([address] Depression, and Seizures ([facility] [address] Past surgical history:  has a past surgical history that includes Tonsillectomy and adenoidectomy and Tympanostomy tube placement. Home medications:   Prior to Admission medications    Medication Sig Start Date End Date Taking?  Authorizing Cardiovascular: RRR, no murmurs, rubs, or gallops, radial pulses 2+ bilaterally. Pulmonary/Chest: Effort normal. No respiratory distress. CTAB. No stridor. No wheezes. No rales. Abdominal: Soft. Diffuse abdominal TTP most prominent in epigastric region. No distension. No guarding, rebound tenderness, or evidence of ascites. : No CVA tenderness. Musculoskeletal: Moves all extremities. No gross deformity. Neurological: Alert and oriented to person, place, and time. Normal muscle tone. Skin: Warm and dry. No rash. Psychiatric: Normal mood and affect. Behavior is normal.      Radiographs (if obtained):  [] The following radiograph was interpreted by myself in the absence of a radiologist:   [x] Radiologist's Report Reviewed:  CT ABDOMEN PELVIS W IV CONTRAST   Final Result   1. No acute intra-abdominal process identified. 2. No evidence for bowel obstruction. 3. Normal appendix.                 Labs  Results for orders placed or performed during the hospital encounter of 05/13/19   CBC auto diff   Result Value Ref Range    WBC 6.8 4.0 - 10.5 K/CU MM    RBC 5.56 4.6 - 6.2 M/CU MM    Hemoglobin 16.6 13.5 - 18.0 GM/DL    Hematocrit 48.6 42 - 52 %    MCV 87.4 78 - 100 FL    MCH 29.9 27 - 31 PG    MCHC 34.2 32.0 - 36.0 %    RDW 11.7 11.7 - 14.9 %    Platelets 567 609 - 561 K/CU MM    MPV 9.1 7.5 - 11.1 FL    Differential Type AUTOMATED DIFFERENTIAL     Segs Relative 65.4 36 - 66 %    Lymphocytes % 23.9 (L) 24 - 44 %    Monocytes % 9.5 (H) 0 - 4 %    Eosinophils % 0.4 0 - 3 %    Basophils % 0.4 0 - 1 %    Segs Absolute 4.4 K/CU MM    Lymphocytes # 1.6 K/CU MM    Monocytes # 0.7 K/CU MM    Eosinophils # 0.0 K/CU MM    Basophils # 0.0 K/CU MM    Nucleated RBC % 0.0 %    Total Nucleated RBC 0.0 K/CU MM    Total Immature Neutrophil 0.03 K/CU MM    Immature Neutrophil % 0.4 0 - 0.43 %   CMP   Result Value Ref Range    Sodium 137 135 - 145 MMOL/L    Potassium 3.3 (L) 3.5 - 5.1 MMOL/L    Chloride 102 99 - patient is feeling much better and comfortable with d/c home. I estimate there is LOW risk for ACUTE APPENDICITIS, BOWEL OBSTRUCTION, CHOLECYSTITIS, DIVERTICULITIS, INCARCERATED HERNIA, PANCREATITIS, MESENTERIC ISCHEMIA, ABDOMINAL AORTIC ANEURYSM/DISSECTION, PERFORATED BOWEL, and PERFORATED ULCER, thus I consider the discharge disposition reasonable. Kody Conway and I have discussed the diagnosis and risks, and we agree with discharging home with PCP follow-up. We also discussed returning to the Emergency Department immediately if new or worsening symptoms occur. We have discussed the symptoms which are most concerning (e.g., bloody stool, fever, changing or worsening pain, intractable vomiting, chest pain) that necessitate immediate return. This patient was also seen and evaluated by Dr. Kerry Caldwell. Final Impression  1. Nausea vomiting and diarrhea    2. Hypokalemia    3. Abdominal pain, unspecified abdominal location        Blood pressure 101/62, pulse 89, temperature 98.1 °F (36.7 °C), temperature source Oral, resp. rate 16, height 6' 3\" (1.905 m), weight 205 lb (93 kg), SpO2 97 %. Disposition:  Discharge to home in stable condition. Patient was given scripts for the following medications. I counseled patient how to take these medications. New Prescriptions    DICYCLOMINE (BENTYL) 10 MG CAPSULE    Take 2 capsules by mouth every 6 hours as needed (cramps)    PROMETHAZINE (PHENERGAN) 25 MG TABLET    Take 1 tablet by mouth every 6 hours as needed for Nausea WARNING:  May cause drowsiness. May impair ability to operate vehicles or machinery. Do not use in combination with alcohol. This chart was generated using the [address] [place] [address] [address] dictation system. I created this record but it may contain dictation errors given the limitations of this technology.        Mildred Lee PA-C  05/13/19 2033

## 2019-05-14 NOTE — ED NOTES
Discharge instructions and prescriptions discussed with patient. Patient verbalized understanding at this time. Patient ambulated self to exit with steady gait.       Mika Dawson RN  05/13/19 0288

## 2019-06-23 ENCOUNTER — HOSPITAL ENCOUNTER (EMERGENCY)
Age: 34
Discharge: HOME | End: 2019-06-23
Attending: EMERGENCY MEDICINE
Payer: MEDICARE

## 2019-06-23 VITALS
RESPIRATION RATE: 18 BRPM | HEART RATE: 68 BPM | TEMPERATURE: 98 F | WEIGHT: 203 LBS | SYSTOLIC BLOOD PRESSURE: 120 MMHG | HEIGHT: 74 IN | BODY MASS INDEX: 26.05 KG/M2 | OXYGEN SATURATION: 100 % | DIASTOLIC BLOOD PRESSURE: 86 MMHG

## 2019-06-23 DIAGNOSIS — R20.2 PARESTHESIAS IN LEFT HAND: ICD-10-CM

## 2019-06-23 DIAGNOSIS — L23.7 POISON IVY DERMATITIS: Primary | ICD-10-CM

## 2019-06-23 PROCEDURE — 99283 EMERGENCY DEPT VISIT LOW MDM: CPT

## 2019-06-23 RX ORDER — METHYLPREDNISOLONE 4 MG/1
TABLET ORAL
Qty: 1 KIT | Refills: 0 | Status: SHIPPED | OUTPATIENT
Start: 2019-06-23 | End: 2019-06-23 | Stop reason: SDUPTHER

## 2019-06-23 RX ORDER — METHYLPREDNISOLONE 4 MG/1
TABLET ORAL
Qty: 1 KIT | Refills: 0 | Status: SHIPPED | OUTPATIENT
Start: 2019-06-23 | End: 2019-08-20 | Stop reason: ALTCHOICE

## 2019-06-23 ASSESSMENT — ENCOUNTER SYMPTOMS
COUGH: 0
ABDOMINAL PAIN: 0
SHORTNESS OF BREATH: 0
RHINORRHEA: 0
BACK PAIN: 0
EYE REDNESS: 0
SORE THROAT: 0
NAUSEA: 0
VOMITING: 0

## 2019-06-23 NOTE — ED PROVIDER NOTES
reviewed. No pertinent family history. Social History     Socioeconomic History    Marital status:      Spouse name: Not on file    Number of children: Not on file    Years of education: Not on file    Highest education level: Not on file   Occupational History    Not on file   Social Needs    Financial resource strain: Not on file    Food insecurity:     Worry: Not on file     Inability: Not on file    Transportation needs:     Medical: Not on file     Non-medical: Not on file   Tobacco Use    Smoking status: Former Smoker    Smokeless tobacco: Never Used   Substance and Sexual Activity    Alcohol use: No    Drug use: Not Currently     Types: Marijuana    Sexual activity: Yes     Partners: Female   Lifestyle    Physical activity:     Days per week: Not on file     Minutes per session: Not on file    Stress: Not on file   Relationships    Social connections:     Talks on phone: Not on file     Gets together: Not on file     Attends Mu-ism service: Not on file     Active member of club or organization: Not on file     Attends meetings of clubs or organizations: Not on file     Relationship status: Not on file    Intimate partner violence:     Fear of current or ex partner: Not on file     Emotionally abused: Not on file     Physically abused: Not on file     Forced sexual activity: Not on file   Other Topics Concern    Not on file   Social History Narrative    Not on file     No current facility-administered medications for this encounter. Current Outpatient Medications   Medication Sig Dispense Refill    methylPREDNISolone (MEDROL, RICKY,) 4 MG tablet Take by mouth.  1 kit 0    dicyclomine (BENTYL) 10 MG capsule Take 2 capsules by mouth every 6 hours as needed (cramps) 20 capsule 0    ondansetron (ZOFRAN) 4 MG tablet Take 1 tablet by mouth every 8 hours as needed for Nausea 10 tablet 0    naproxen (NAPROSYN) 500 MG tablet Take 1 tablet by mouth 2 times daily as needed for Pain 20 I have reviewed and interpreted all of the currently available lab results from this visit (if applicable):  No results found for this visit on 06/23/19. Radiographs (if obtained):  [] The following radiograph was interpreted by myself in the absence of a radiologist:  [x]Radiologist's Report Reviewed:  No orders to display         EKG (if obtained): (All EKG's are interpreted by myself in the absence of a cardiologist)    MDM:  Differential diagnoses considered include poison ivy, poison oak, allergic dermatitis, neuropathy, overuse neuropathy. Patient's tingling symptoms are not consistent with any specific nerve distribution on her only on his distal fingertips. Patient does not have any significant medical history, but does do repetitive movements with his hand while working outside. I suspect this is the cause of the tingling in his fingertips. I recommended rest ice and elevation of his hand, particularly after work. The rash is consistent with an allergic dermatitis to either poison ivy or poison oak. We will treat him with a Medrol Dosepak. I recommended him trying to avoid touching the area so that he does not spread the rash. I do not suspect an emergent cause of his symptoms. We will discharge him home in stable condition. Plan of care explained to patient. Concerning signs and symptoms warranting a return visit to the Emergency Department were explained in detail. All questions and concerns were addressed to the patient's satisfaction. Patient understood and agreed with plan. The likelihood of other entities in the differential is insufficient to justify any further testing for them. This was explained to the patient. The patient was advised that persistent or worsening symptoms would requirefurther evaluation. Clinical Impression:  1. Poison ivy dermatitis    2.  Paresthesias in left hand          Dharmesh Armstrong MD       Please note that portions of this note may have been

## 2019-08-20 ENCOUNTER — HOSPITAL ENCOUNTER (EMERGENCY)
Age: 34
Discharge: HOME | End: 2019-08-20
Payer: MEDICARE

## 2019-08-20 VITALS
TEMPERATURE: 98 F | BODY MASS INDEX: 25.61 KG/M2 | HEART RATE: 77 BPM | HEIGHT: 75 IN | WEIGHT: 206 LBS | SYSTOLIC BLOOD PRESSURE: 151 MMHG | DIASTOLIC BLOOD PRESSURE: 63 MMHG | RESPIRATION RATE: 22 BRPM | OXYGEN SATURATION: 98 %

## 2019-08-20 DIAGNOSIS — L25.5 PLANT DERMATITIS: Primary | ICD-10-CM

## 2019-08-20 PROCEDURE — 6370000000 HC RX 637 (ALT 250 FOR IP): Performed by: PHYSICIAN ASSISTANT

## 2019-08-20 PROCEDURE — 99282 EMERGENCY DEPT VISIT SF MDM: CPT

## 2019-08-20 RX ORDER — PAROXETINE 10 MG/5ML
SUSPENSION ORAL EVERY MORNING
COMMUNITY
End: 2020-12-03

## 2019-08-20 RX ORDER — PREDNISONE 20 MG/1
60 TABLET ORAL ONCE
Status: COMPLETED | OUTPATIENT
Start: 2019-08-20 | End: 2019-08-20

## 2019-08-20 RX ORDER — METHYLPHENIDATE HYDROCHLORIDE 10 MG/1
30 TABLET ORAL ONCE
COMMUNITY
End: 2020-12-03

## 2019-08-20 RX ORDER — PREDNISONE 20 MG/1
TABLET ORAL
Qty: 30 TABLET | Refills: 0 | Status: SHIPPED | OUTPATIENT
Start: 2019-08-20 | End: 2020-01-19

## 2019-08-20 RX ORDER — HYDROXYZINE HYDROCHLORIDE 50 MG/ML
50 INJECTION, SOLUTION INTRAMUSCULAR EVERY 6 HOURS PRN
COMMUNITY
End: 2020-12-03

## 2019-08-20 RX ORDER — DIPHENHYDRAMINE HCL 25 MG
25 CAPSULE ORAL EVERY 6 HOURS PRN
Qty: 20 CAPSULE | Refills: 0 | Status: SHIPPED | OUTPATIENT
Start: 2019-08-20 | End: 2019-08-30

## 2019-08-20 RX ORDER — DIPHENHYDRAMINE HCL 25 MG
25 TABLET ORAL ONCE
Status: COMPLETED | OUTPATIENT
Start: 2019-08-20 | End: 2019-08-20

## 2019-08-20 RX ADMIN — DIPHENHYDRAMINE HCL 25 MG: 25 TABLET ORAL at 18:43

## 2019-08-20 RX ADMIN — PREDNISONE 60 MG: 20 TABLET ORAL at 18:43

## 2020-01-19 ENCOUNTER — HOSPITAL ENCOUNTER (EMERGENCY)
Age: 35
Discharge: HOME | End: 2020-01-19
Payer: MEDICARE

## 2020-01-19 VITALS
RESPIRATION RATE: 16 BRPM | DIASTOLIC BLOOD PRESSURE: 81 MMHG | OXYGEN SATURATION: 97 % | BODY MASS INDEX: 26.11 KG/M2 | SYSTOLIC BLOOD PRESSURE: 122 MMHG | WEIGHT: 210 LBS | TEMPERATURE: 99.1 F | HEART RATE: 87 BPM | HEIGHT: 75 IN

## 2020-01-19 PROCEDURE — 99283 EMERGENCY DEPT VISIT LOW MDM: CPT

## 2020-01-19 RX ORDER — DEXTROMETHORPHAN HYDROBROMIDE AND PROMETHAZINE HYDROCHLORIDE 15; 6.25 MG/5ML; MG/5ML
5 SYRUP ORAL 4 TIMES DAILY PRN
Qty: 120 ML | Refills: 0 | Status: SHIPPED | OUTPATIENT
Start: 2020-01-19 | End: 2020-01-26

## 2020-01-19 RX ORDER — IBUPROFEN 600 MG/1
600 TABLET ORAL EVERY 6 HOURS PRN
Qty: 20 TABLET | Refills: 0 | Status: SHIPPED | OUTPATIENT
Start: 2020-01-19 | End: 2020-12-03

## 2020-01-19 ASSESSMENT — PAIN DESCRIPTION - LOCATION
LOCATION: BACK;GENERALIZED
LOCATION: GENERALIZED

## 2020-01-19 ASSESSMENT — PAIN DESCRIPTION - PAIN TYPE
TYPE: ACUTE PAIN
TYPE: ACUTE PAIN

## 2020-01-19 ASSESSMENT — PAIN DESCRIPTION - DESCRIPTORS: DESCRIPTORS: ACHING

## 2020-01-19 ASSESSMENT — PAIN SCALES - GENERAL
PAINLEVEL_OUTOF10: 8
PAINLEVEL_OUTOF10: 8

## 2020-01-19 NOTE — ED PROVIDER NOTES
EMERGENCY DEPARTMENT ENCOUNTER      PCP: No primary care provider on file. CHIEF COMPLAINT    Chief Complaint   Patient presents with    Cough    Generalized Body Aches         This patient was not evaluated by the attending physician. I have independently evaluated this patient . HPI    Nimisha Huitron is a 27 y.o. male who presents with nasal congestion, cough, general aches. Onset 3 days. Context is patient has the above symptoms for 3 days without wheezes or shortness of breath. Patient notes sick contact at home with similar symptoms. REVIEW OF SYSTEMS    Constitutional:  Denies fever, chills  HEENT:  See above  Cardiovascular:  Denies chest pain, palpitations. Respiratory:  Denies shortness of breath or wheezes  GI:  Denies abdominal pain, vomiting, or diarrhea   Neurologic:  Denies confusion, light headedness, dizziness, or syncope   Skin:  No rash    All other review of systems are negative  See HPI and nursing notes for additional information       PAST MEDICAL AND SURGICAL HISTORY    Past Medical History:   Diagnosis Date    Anxiety     B[facility] 2 disorder ([facility] [address]     Depression     Seizures ([facility] [address]      Past Surgical History:   Procedure Laterality Date    TONSILLECTOMY AND ADENOIDECTOMY      TYMPANOSTOMY TUBE PLACEMENT         CURRENT MEDICATIONS    Current Outpatient Rx   Medication Sig Dispense Refill    promethazine-dextromethorphan (PROMETHAZINE-DM) 6.25-15 MG/5ML syrup Take 5 mLs by mouth 4 times daily as needed for Cough 120 mL 0    ibuprofen (ADVIL;MOTRIN) 600 MG tablet Take 1 tablet by mouth every 6 hours as needed for Pain 20 tablet 0    methylphenidate (RITALIN) 10 MG tablet Take 30 mg by mouth once.       hydrOXYzine (VISTARIL) 50 MG/ML injection Inject 50 mg into the muscle every 6 hours as needed for Itching      PARoxetine (PAXIL) 10 MG/5ML suspension Take by mouth every morning      brexpiprazole (REXULTI) 0.25 MG TABS tablet Take 0.25 mg by mouth daily      bulging.   - Nasal passages with mildly erythematous and edematous turbinates. No massess.   - Oropharynx mildly erythematous without tonsillar hypertrophy or exudate. Neck/Lymphatics:    - supple, no JVD, no swollen nodes     Respiratory:     Nonlabored breathing - No retractions, no accessory muscle use   Clear to auscultation bilateral lung fields, no wheezes/rales/rhonchi. Cardiovascular:  Normal rate, normal rhythm   GI:  Soft, no abdominal tenderness, no guarding. Musculoskeletal:  No obvious deficits, no edema   Integument:  Skin pink, warm, dry, intact             ED COURSE & MEDICAL DECISION MAKING        I discussed the likely viral etiology of patient's symptoms possibly influenza, with Pt today and recommend symptomatic treatment with increase fluids, rest.   I provided symptomatic treatment. I recommend followup with primary care provider in 2-3 days for recheck. Patient agrees to return emergency department if symptoms worsen or any new symptoms develop. Clinical  IMPRESSION    1. Upper respiratory tract infection, unspecified type    2. Cough          Comment: Please note this report has been produced using speech recognition software and may contain errors related to that system including errors in grammar, punctuation, and spelling, as well as words and phrases that may be inappropriate. If there are any questions or concerns please feel free to contact the dictating provider for clarification.        Yin Morrowma  01/19/20 1128

## 2020-04-14 ENCOUNTER — NURSE TRIAGE (OUTPATIENT)
Dept: OTHER | Facility: CLINIC | Age: 35
End: 2020-04-14

## 2020-05-21 ENCOUNTER — HOSPITAL ENCOUNTER (EMERGENCY)
Age: 35
Discharge: HOME | End: 2020-05-21
Payer: MEDICARE

## 2020-05-21 VITALS — HEIGHT: 76 IN | WEIGHT: 210 LBS | BODY MASS INDEX: 25.57 KG/M2

## 2020-05-21 PROCEDURE — 99283 EMERGENCY DEPT VISIT LOW MDM: CPT

## 2020-05-21 NOTE — ED NOTES
Patient brought to Ed by deputy for complaints of depression, reports that he takes ritalin, Abilify and Paxil. Per deputy report, patient was video chatting with a friend earlier, and threatened to hurt himself because he's \" having a rough time, getting a divorce and loosing his kids\". Patient denies any suicidal or homicidal ideation. Patient states \"  I just want to talk to someone enrique been having a hard time\". Reports previous suicidal attempt two years ago. Patient states \" my parents are gone I don't have anyone that cares about me\". Patient calm and cooperative. Patient upset cause he works tomorrow and doesn't want to loose his job \" and me loose another thing in my life\". Patient was not pick slipped.       Awa Bucio RN  05/21/20 1958

## 2020-05-22 NOTE — ED NOTES
Talked with Pt. Pt denies SI/HI. Pt states that he is currently going through a divorce with his wife and she is being manipulative. Pt states that he wishes to go back to his counselor. Pt states that he does see a [place] [id number] practitioner and is compliant with his medications.      Hansa Crenshaw RN  05/21/20 2037

## 2020-05-22 NOTE — ED PROVIDER NOTES
(RITALIN) 10 MG tablet Take 30 mg by mouth once.  hydrOXYzine (VISTARIL) 50 MG/ML injection Inject 50 mg into the muscle every 6 hours as needed for Itching      PARoxetine (PAXIL) 10 MG/5ML suspension Take by mouth every morning      brexpiprazole (REXULTI) 0.25 MG TABS tablet Take 0.25 mg by mouth daily      ARIPiprazole (ABILIFY PO) Take by mouth         ALLERGIES    Allergies   Allergen Reactions    Amoxicillin Diarrhea       FAMILY HISTORY    History reviewed. No pertinent family history. SOCIAL HISTORY    Social History     Socioeconomic History    Marital status:      Spouse name: None    Number of children: None    Years of education: None    Highest education level: None   Occupational History    None   Social Needs    Financial resource strain: None    Food insecurity     Worry: None     Inability: None    Transportation needs     Medical: None     Non-medical: None   Tobacco Use    Smoking status: Former Smoker    Smokeless tobacco: Never Used   Substance and Sexual Activity    Alcohol use: No    Drug use: Not Currently     Types: Marijuana    Sexual activity: Yes     Partners: Female   Lifestyle    Physical activity     Days per week: None     Minutes per session: None    Stress: None   Relationships    Social connections     Talks on phone: None     Gets together: None     Attends Scientologist service: None     Active member of club or organization: None     Attends meetings of clubs or organizations: None     Relationship status: None    Intimate partner violence     Fear of current or ex partner: None     Emotionally abused: None     Physically abused: None     Forced sexual activity: None   Other Topics Concern    None   Social History Narrative    None       PHYSICAL EXAM    VITAL SIGNS: Ht 6' 4\" (1.93 m)   Wt 210 lb (95.3 kg)   BMI 25.56 kg/m²   Constitutional:  Well developed, well nourished, no acute distress, non-toxic appearance   Eyes:  PERRL, EOMI.

## 2020-12-03 ENCOUNTER — HOSPITAL ENCOUNTER (OUTPATIENT)
Age: 35
Discharge: HOME | End: 2020-12-03
Payer: MEDICARE

## 2020-12-03 ENCOUNTER — HOSPITAL ENCOUNTER (OUTPATIENT)
Dept: GENERAL RADIOLOGY | Age: 35
Discharge: HOME | End: 2020-12-03
Payer: MEDICARE

## 2020-12-03 ENCOUNTER — OFFICE VISIT (OUTPATIENT)
Dept: INTERNAL MEDICINE CLINIC | Age: 35
End: 2020-12-03
Payer: MEDICARE

## 2020-12-03 VITALS
HEIGHT: 73 IN | TEMPERATURE: 97.3 F | OXYGEN SATURATION: 99 % | SYSTOLIC BLOOD PRESSURE: 118 MMHG | BODY MASS INDEX: 28.89 KG/M2 | HEART RATE: 65 BPM | WEIGHT: 218 LBS | DIASTOLIC BLOOD PRESSURE: 78 MMHG

## 2020-12-03 LAB
BILIRUBIN URINE: NEGATIVE
BLOOD, URINE: NEGATIVE
CLARITY: CLEAR
COLOR: YELLOW
EPITHELIAL CELLS, UA: 0 /HPF (ref 0–5)
GLUCOSE URINE: NEGATIVE MG/DL
HYALINE CASTS: 0 /LPF (ref 0–8)
KETONES, URINE: NEGATIVE MG/DL
LEUKOCYTE ESTERASE, URINE: NEGATIVE
MICROSCOPIC EXAMINATION: NORMAL
NITRITE, URINE: NEGATIVE
PH UA: 7 (ref 5–8)
PROTEIN UA: NEGATIVE MG/DL
RBC UA: 1 /HPF (ref 0–4)
SPECIFIC GRAVITY UA: 1.02 (ref 1–1.03)
URINE TYPE: NORMAL
UROBILINOGEN, URINE: 0.2 E.U./DL
WBC UA: 0 /HPF (ref 0–5)

## 2020-12-03 PROCEDURE — G8427 DOCREV CUR MEDS BY ELIG CLIN: HCPCS | Performed by: FAMILY MEDICINE

## 2020-12-03 PROCEDURE — 1036F TOBACCO NON-USER: CPT | Performed by: FAMILY MEDICINE

## 2020-12-03 PROCEDURE — 99203 OFFICE O/P NEW LOW 30 MIN: CPT | Performed by: FAMILY MEDICINE

## 2020-12-03 PROCEDURE — G8419 CALC BMI OUT NRM PARAM NOF/U: HCPCS | Performed by: FAMILY MEDICINE

## 2020-12-03 PROCEDURE — G8484 FLU IMMUNIZE NO ADMIN: HCPCS | Performed by: FAMILY MEDICINE

## 2020-12-03 PROCEDURE — 72110 X-RAY EXAM L-2 SPINE 4/>VWS: CPT

## 2020-12-03 RX ORDER — METHOCARBAMOL 750 MG/1
750 TABLET, FILM COATED ORAL 2 TIMES DAILY PRN
Qty: 40 TABLET | Refills: 0 | Status: SHIPPED | OUTPATIENT
Start: 2020-12-03 | End: 2021-01-09

## 2020-12-03 RX ORDER — TRAZODONE HYDROCHLORIDE 50 MG/1
TABLET ORAL NIGHTLY
COMMUNITY
Start: 2020-11-15

## 2020-12-03 RX ORDER — METHYLPHENIDATE HYDROCHLORIDE 20 MG/1
TABLET ORAL
COMMUNITY

## 2020-12-03 ASSESSMENT — ENCOUNTER SYMPTOMS
BLOOD IN STOOL: 0
NAUSEA: 0
DIARRHEA: 0
CONSTIPATION: 0
BACK PAIN: 1
COUGH: 0
ABDOMINAL PAIN: 0
SHORTNESS OF BREATH: 0
EYE PAIN: 0

## 2020-12-15 ENCOUNTER — HOSPITAL ENCOUNTER (OUTPATIENT)
Dept: PHYSICAL THERAPY | Age: 35
Setting detail: THERAPIES SERIES
Discharge: HOME | End: 2020-12-15
Payer: MEDICARE

## 2020-12-15 PROCEDURE — 97162 PT EVAL MOD COMPLEX 30 MIN: CPT

## 2020-12-15 PROCEDURE — 97110 THERAPEUTIC EXERCISES: CPT

## 2020-12-15 ASSESSMENT — PAIN SCALES - GENERAL: PAINLEVEL_OUTOF10: 10

## 2020-12-15 NOTE — PROGRESS NOTES
Physical Therapy  Initial Assessment  Date: 12/15/2020  Patient Name: Blaine Batista  MRN: [id number]  : 1985     Treatment Diagnosis: lumbar radiculopathy    Restrictions       Subjective   General  Additional Pertinent Hx: chronic low back pain for years, LLE symptoms, L thigh numbness, tingling, burning, some at lat L leg. Pain with bending, difficulty sleeping (preferred positions is sidelying flexed)  stand/walk 30-45mins  Referring Practitioner: Yash Barreto DO  Diagnosis: B low back pain, L sciatica  PT Visit Information  PT Insurance Information: [employer]  Subjective  Subjective: chronic low back pain. some relief with cannibis use. Pain Screening  Patient Currently in Pain: Yes  Pain Assessment  Pain Assessment: 0-10  Pain Level: 10  Vital Signs  Patient Currently in Pain: Yes    Vision/Hearing  Vision  Vision: Within Functional Limits  Hearing  Hearing: Within functional limits    Orientation  Orientation  Overall Orientation Status: Within Functional Limits    Social/Functional History  Social/Functional History  Lives With: Significant other  Active : Yes  Mode of Transportation: Car(car is broken)  Occupation: Full time employment  Type of occupation: [employer]    Objective     Observation/Palpation  Palpation: l4-5 tenderness, L >R lumbar paraspinals tenderness and tone, L gluteals and greater trochanter min tender. AROM RLE (degrees)  RLE AROM: WFL  AROM LLE (degrees)  LLE AROM : WFL  LLE General AROM: with pain  Spine  Lumbar: flexion to knees, extension 5 deg, LSB 25% limit w pain. RSB wfls. Special Tests: + SLR lbp B at less than 60deg, Pain with MEKA, FADIR B L worse.   Joint Mobility  Spine: guarded and limited    Strength RLE  Strength RLE: WFL  Strength LLE  Comment: hip 3/5, knee 3+/5, ankle 4+/5 df     Additional Measures  Special Tests: prone lying increased pain  Sensation  Overall Sensation Status: (decreased at L lat thigh and leg.)     Transfers  Comment: guarded with transfers and bed mobility       Assessment   Conditions Requiring Skilled Therapeutic Intervention  Assessment: Pt presents with complaints of chronic back pain dating back several years, within the last couple years he began having LLE symptoms. Symptoms are worsening, with back pain being constant, constant numb feeling in L lat thigh and intermittent tingling, pins/needles, burning in L lat thigh and L lat leg. Pain is reported with bending, walking, lifting, standing, sitting. Limitations in sitting, standing, sleeping, lifting, bending, walking, working are reported. He has loss of sensation at L lateral thigh and leg, pain limited motion of his trunk. LLE pain limited strength at hip, thigh, and some weakness at ankle. Treatment Diagnosis: lumbar radiculopathy  REQUIRES PT FOLLOW UP: Yes         Plan   Plan  Times per week: 2  Plan weeks: 6       Goals  Short term goals  Time Frame for Short term goals: 3 weeks  Short term goal 1: TROM improve flexion to reach within 2 inches of ankle with minimal pain. Short term goal 2: full extension AROM  Long term goals  Time Frame for Long term goals : 6 weeks  Long term goal 1: I in home program.  Long term goal 2: decrease LE by 75% or more. Long term goal 3: stand/walk 1+ hr with minimal pain.   Long term goal 4: sleep without waking due to pain  Patient Goals   Patient goals : decrease pain          Anitha Wilson, PT

## 2020-12-15 NOTE — PLAN OF CARE
Outpatient Physical Therapy                  [x] Phone: [phone]   Fax: [phone]    Pediatric Therapy                                    [] Phone: [phone]   Fax: [phone]  Pediatric [place]                                      [] Phone: [phone]   Fax: [phone]      To: Referring Practitioner: Jeffry Cantu DO    From: Rehana Armas PT     Patient: Callie Mehta       : 1985  Diagnosis: B low back pain, L sciatica   Treatment Diagnosis: lumbar radiculopathy   Date: 12/15/2020    Physical Therapy Certification/Re-Certification Form  Dear Dr. Alex Stallings  The following patient has been evaluated for physical therapy services and for therapy to continue, Please review the attached evaluation and/or summary of the patient's plan of care, and verify that you agree therapy should continue by signing the attached document and sending it back to our office. Patient is a  26 yo male who presents with chronic pain which impacts on adls, work, sleep;patient's goal is to decrease pain ;patient reports that pain  limits activities including work, sitting, standing, bending, sleeping; PT to address patient's goals, impairments and activity limitations with skilled interventions checked in plan of care;patient's level of function prior to onset of pain was independent; did not observe any barriers to learning during PT eval; learning preferences include demonstration, practice, and handouts; patient expressed understanding of HEP; patient appears to be motivated to participate in an active PT program and to be compliant with HEP expectations;patient assisted in developing treatment plan and goals; no DME is currently being used;      Current functional level (based on [employer] )   :34%    Assessment:  Assessment: Pt presents with complaints of chronic back pain dating back several years, within the last couple years he began having LLE symptoms.   Symptoms are worsening, with back pain being constant, constant numb feeling in L lat thigh and intermittent tingling, pins/needles, burning in L lat thigh and L lat leg. Pain is reported with bending, walking, lifting, standing, sitting. Limitations in sitting, standing, sleeping, lifting, bending, walking, working are reported. He has loss of sensation at L lateral thigh and leg, pain limited motion of his trunk. LLE pain limited strength at hip, thigh, and some weakness at ankle. Plan of Care/Treatment to date:  [x] Therapeutic Exercise    [] Aquatics:  [x] Therapeutic Activity    [] Ultrasound  [x] Elec Stimulation  [] Gait Training     [] Cervical Traction [x] Lumbar Traction  [x] Neuromuscular Re-education [] Cold/hotpack [] Iontophoresis   [x] Instruction in HEP       [x] Manual Therapy     [] vasopneumatic            [] Self care home management        []Dry needling trigger point point/pain management          Frequency/Duration:  # Days per week: [] 1 day # Weeks: [] 1 week [] 5 weeks     [x] 2 days   [] 2 weeks [x] 6 weeks     [] 3 days   [] 3 weeks [] 7 weeks     [] 4 days   [] 4 weeks [] 8 weeks    Rehab Potential/Progress: [] Excellent [x] Good [] Fair  [] Poor     Goals:    Short term goals  Time Frame for Short term goals: 3 weeks  Short term goal 1: TROM improve flexion to reach within 2 inches of ankle with minimal pain. Short term goal 2: full extension AROM  Long term goals  Time Frame for Long term goals : 6 weeks  Long term goal 1: I in home program.  Long term goal 2: decrease LE by 75% or more. Long term goal 3: stand/walk 1+ hr with minimal pain. Long term goal 4: sleep without waking due to pain  Electronically signed by:  Cornell Hoff PT, 12/15/2020, 12:13 PM              If you have any questions or concerns, please don't hesitate to call.   Thank you for your referral.      Physician Signature:_________________Date:____________Time: ________  By signing above, therapists plan is approved by physician

## 2020-12-15 NOTE — FLOWSHEET NOTE
None        Objective:  See eval     Prior to today's treatment session, patient was screened for signs and symptoms related to COVID-19 including but not limited to verbally answering questions related to feeling ill, cough, or SOB, along with taking temperature via forehead thermometer. Patient presented with all negative signs and symptoms and had no fever >100 degrees Fahrenheit this date. Exercises: (No more than 4 columns)   Exercise/Equipment Date 12/15/20 Date Date           WARM UP                     TABLE      Prone lying -> prop Lying 3 min,      Gluteal stretching      Hip flexor stretching      TA Contraction      Pelvic neutral marching      clamshells                     STANDING      shld extension      Lat pull down      Pallof/belly press in standing and 1/2 kneel      Lunge                             PROPRIOCEPTION                                    MODALITIES                      Other Therapeutic Activities/Education:  Ed on centralization and peripheralization, basic back anatomy, dermatomes and nerve distribution      Home Exercise Program:  Standing back extension every 2hr at least 10reps. Manual Treatments:        Modalities:        Communication with other providers:        Assessment:  (Response towards treatment session) (Pain Rating)    Assessment: Pt presents with complaints of chronic back pain dating back several years, within the last couple years he began having LLE symptoms. Symptoms are worsening, with back pain being constant, constant numb feeling in L lat thigh and intermittent tingling, pins/needles, burning in L lat thigh and L lat leg. Pain is reported with bending, walking, lifting, standing, sitting. Limitations in sitting, standing, sleeping, lifting, bending, walking, working are reported. He has loss of sensation at L lateral thigh and leg, pain limited motion of his trunk.   LLE pain limited strength at hip, thigh, and some weakness at ankle.      Plan for Next Session:  trial leg pulls      Time In / Time Out:     4860/4210        Timed Code/Total Treatment Minutes:  20/49      Next Progress Note due:        Plan of Care Interventions:  [x] Therapeutic Exercise  [] Modalities:  [x] Therapeutic Activity     [] Ultrasound  [] Estim  [] Gait Training      [] Cervical Traction [x] Lumbar Traction  [x] Neuromuscular Re-education    [] Cold/hotpack [] Iontophoresis   [x] Instruction in HEP      [] Vasopneumatic   [] Dry Needling    [x] Manual Therapy               [] Aquatic Therapy              Electronically signed by:  Toni Felton 12/15/2020, 12:20 PM

## 2020-12-16 ENCOUNTER — TELEPHONE (OUTPATIENT)
Dept: INTERNAL MEDICINE CLINIC | Age: 35
End: 2020-12-16

## 2020-12-16 NOTE — TELEPHONE ENCOUNTER
I had to see who was on his plan. It looks like [facility]-- Referral ordered. You can give him the number to call. Send Xray.  Inform DDD lumbar L5- S1, Ua -neg

## 2020-12-17 ENCOUNTER — HOSPITAL ENCOUNTER (OUTPATIENT)
Dept: PHYSICAL THERAPY | Age: 35
Setting detail: THERAPIES SERIES
Discharge: HOME | End: 2020-12-17
Payer: MEDICARE

## 2020-12-17 PROCEDURE — 97110 THERAPEUTIC EXERCISES: CPT

## 2020-12-17 PROCEDURE — G0283 ELEC STIM OTHER THAN WOUND: HCPCS

## 2020-12-17 NOTE — FLOWSHEET NOTE
Outpatient Physical Therapy  [place]           [x] Phone: [phone]   Fax: [phone]  [place]           [] Phone: [phone]   Fax: [phone]        Physical Therapy Daily Treatment Note  Date:  2020    Patient Name:  Soniya Gasca    :  1985  MRN: [id number]  Restrictions/Precautions:    Diagnosis:   Diagnosis: B low back pain, L sciatica  Date of Injury/Surgery:   Treatment Diagnosis: Treatment Diagnosis: lumbar radiculopathy    Insurance/Certification information: PT Insurance Information: [place]   Referring Physician:  Referring Practitioner: Noble Mauricio DO  Next Doctor Visit:    Plan of care signed (Y/N):  n  Outcome Measure:   Visit# / total visits:  2/6 initally  Pain level: 10/10 across low back, down lateral to front of thigh down to the knee      Goals:       Short term goals  Time Frame for Short term goals: 3 weeks  Short term goal 1: TROM improve flexion to reach within 2 inches of ankle with minimal pain. Short term goal 2: full extension AROM  Long term goals  Time Frame for Long term goals : 6 weeks  Long term goal 1: I in home program.  Long term goal 2: decrease LE by 75% or more. Long term goal 3: stand/walk 1+ hr with minimal pain. Long term goal 4: sleep without waking due to pain    Summary of Evaluation: Assessment: Pt presents with complaints of chronic back pain dating back several years, within the last couple years he began having LLE symptoms. Symptoms are worsening, with back pain being constant, constant numb feeling in L lat thigh and intermittent tingling, pins/needles, burning in L lat thigh and L lat leg. Pain is reported with bending, walking, lifting, standing, sitting. Limitations in sitting, standing, sleeping, lifting, bending, walking, working are reported. He has loss of sensation at L lateral thigh and leg, pain limited motion of his trunk.   LLE pain limited strength at hip, thigh, and some weakness at ankle.        Subjective:   Jonny Tovar arrives to therapy stating that he has been hurting more since his initial evaluation. Knee has been hurting with walking since his last visit and the knee has been wanting to give out on him. Supposed to see spine specialist but having difficulty finding someone who will take his insurance. Pain currently stops at the knee. Any changes in Ambulatory Summary Sheet? None        Objective:  Prior to today's treatment session, patient was screened for signs and symptoms related to COVID-19 including but not limited to verbally answering questions related to feeling ill, cough, or SOB, along with taking temperature via forehead thermometer. Patient presented with all negative signs and symptoms and had no fever >100 degrees Fahrenheit this date. Decreased intensity of pain in L LE with prone lying. Decreased intensity as well as some centralization of pain in L LE with prop. .. no change in numbness reported. Leg pull (Bilat and Unilat both) caused shooting pain down the L LE all the way into the foot. Requires cues for activation of abdominal muscles. Exercises: (No more than 4 columns)   Exercise/Equipment Date 12/15/20 12/17/2020 #2 Date           WARM UP                     TABLE      Prone lying -> prop Lying 3 min,  Prone lying 3'  Prop 2'    Gluteal stretching  DKTC on stability ball 2*10    LTR   10* ea    Hip add ball squeeze  10*3\"    Hip flexor stretching      TA Contraction  10*3\"    Pelvic neutral marching  --    clamshells                     STANDING      shld extension      Lat pull down      Pallof/belly press in standing and 1/2 kneel  --    Lunge  --                           PROPRIOCEPTION                                    MODALITIES                      Other Therapeutic Activities/Education:       Home Exercise Program:  Standing back extension every 2hr at least 10reps.        Manual Treatments:  Leg pull       Modalities:  IFC to low back

## 2020-12-23 ENCOUNTER — OFFICE VISIT (OUTPATIENT)
Dept: PHYSICAL MEDICINE AND REHAB | Age: 35
End: 2020-12-23
Payer: MEDICARE

## 2020-12-23 VITALS — TEMPERATURE: 97.1 F

## 2020-12-23 PROCEDURE — 95886 MUSC TEST DONE W/N TEST COMP: CPT | Performed by: PHYSICAL MEDICINE & REHABILITATION

## 2020-12-23 PROCEDURE — 95912 NRV CNDJ TEST 11-12 STUDIES: CPT | Performed by: PHYSICAL MEDICINE & REHABILITATION

## 2020-12-23 NOTE — LETTER
December 23, 2020        Lilliana Dorman DO  [address] [facility],  [address] [facility]        Patient Name: Alyse Rucker   MRN Number: [id number]   YOB: 1985   Date Of Visit: 12/23/2020        Dear Janice Gonzalez,      Thank you for referring Alyse Rucker to me for electrodiagnostic testing. Attached are the findings of the EMG and my assessment. If you have any further questions, please do not hesitate to call me. Thank you for this interesting referral.      Sincerely,          RACHELLE Christensen MD.

## 2020-12-23 NOTE — PROGRESS NOTES
EMG REPORT     CHIEF COMPLAINT: Left thigh burning pain, back pain and numbness in the feet. HISTORY OF PRESENT ILLNESS: 27 y.o. right hand dominant male with intermittent, but progressive lower back pain since he fell from a height/ladder about 4 years ago. He did not seek medical care at that time but has since been in another emergency room with back pain over the years. He now reports severe burning and tingling pain that occurs with and without activity in the left anteriolateral thigh. He also has numbness and tingling in the feet and clumsiness of gait. He rated his back and leg pain severity as 10/10. He gets cramps in the bottoms of his feet. He has not noted any rashes or limb discoloration. He has activity dependent numbness in the left arm and hand. He has no history of diabetes or any thyroid disorder. He is treated for a bipolar disorder. PHYSICAL EXAMINATION: Alert. Normal lumbar lordosis. Minimal paraspinal muscle tenderness to light palpation. Normal hip and knee passive range of motion. Negative straight leg raising. No inguinal adenopathy. 4+/5 EDB and ankle dorsiflexion strength bilaterally. Diminished perception of touch about the dorsum of the feet. There was no atrophy, tremor or clonus noted. NERVE CONDUCTION STUDIES:     MOTOR         LATENCY NORMAL AMPLITUDE DISTANCE COND. KATRINA. R  PERONEAL   7.7 < 6.2 msec 1.1 8 cm 29   L  PERONEAL 8.2 < 6.2 msec 2.3 8 cm 38   RIGHT  TIBIAL  < 6.2 msec  8 cm    LEFT TIBIAL 5.3 < 6.2 msec 2.7 8 cm 39   R TIB H REFLEX 34.7 < 50 msec      L TIB H REFLEX 33.9 < 50 msec         SENSORY  ANTIDROMIC        LATENCY NORMAL AMPLITUDE DISTANCE   R SUP PERONEAL Absent < 3.6 msec  10 cm   L SUP PERONEAL Absent < 3.6 msec  10 cm   RIGHT  SURAL 4.0 < 4.0 msec 13 14 cm   LEFT  SURAL 3.8 < 4.0 msec 9 14 cm       Left lateral femoral cutaneous sensory: Absent. Right lateral femoral cutaneous sensory: Absent.         NEEDLE EMG:      RIGHT LEFT     Insertional Activity Spontaneous  Activity Volutional  MUAP's Insertional Activity Spontaneous  Activity Volutional  MUAP's   Lumbar paraspinals Normal None Normal Normal None Normal   Glut Med Normal None Normal Normal None Normal   Rect fem Normal None Normal Normal None Normal   Vast Med Normal None Normal Normal None Normal   Ant Tibialis Normal None Dec # Normal None Dec #   EHL Normal None Dec #, Larger Normal None Dec #, Larger   FDL Normal None Normal Normal None Normal   Gastroc Normal None Normal Normal None Normal   EDB Increased Occ Dec #, Larger Increased Occ Dec #, Larger   1st dors int Normal None Normal Normal None Normal       FINDINGS:   EMG of the lumbar paraspinals and both lower limbs demonstrated no paraspinal abnormalities. A few positive waves and fibrillations were seen in the EDB muscles of each foot. No other muscle membrane irritability was noted in the limbs. A diminished number of larger motor units are noted throughout the peroneal nerve distribution of both lower limbs. Peroneal motor latencies were significantly delayed at each ankle. The peroneal motor evoked amplitude was reduced on the right. Both peroneal motor conduction velocities were slowed. Peroneal sensory responses were missing. The left tibial and both sural sensory conductions were normal.  Tibial H reflexes were symmetric. I was unable to record responses from the lateral femoral cutaneous nerves. IMPRESSION:      1. Abnormal EMG. 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 He did not offer any convincing history to suggest that this is simply posttraumatic in nature. 2.  Meralgia paresthetica on the left with compromise of the lateral femoral cutaneous sensory nerve. 3.  No convincing evidence of any spinal nerve root injury (radiculopathy) or plexopathy.      4.  No signs of a generalized peripheral neuropathy or any primary muscle disease.         Thank you for this interesting referral.

## 2020-12-28 ENCOUNTER — TELEPHONE (OUTPATIENT)
Dept: INTERNAL MEDICINE CLINIC | Age: 35
End: 2020-12-28

## 2020-12-28 NOTE — TELEPHONE ENCOUNTER
Left msg for pt with results as well as information on taking Gabapentin at night or seeing a Neurologist that would be out of the area. Also left ph# for Dr Syed López.

## 2020-12-29 ENCOUNTER — HOSPITAL ENCOUNTER (OUTPATIENT)
Dept: PHYSICAL THERAPY | Age: 35
Setting detail: THERAPIES SERIES
Discharge: HOME | End: 2020-12-29
Payer: MEDICARE

## 2020-12-29 PROCEDURE — 97110 THERAPEUTIC EXERCISES: CPT

## 2020-12-29 PROCEDURE — 97530 THERAPEUTIC ACTIVITIES: CPT

## 2020-12-29 NOTE — FLOWSHEET NOTE
Outpatient Physical Therapy  [place]           [x] Phone: [phone]   Fax: [phone]  [facility]           [] Phone: [phone]   Fax: [phone]        Physical Therapy Daily Treatment Note  Date:  2020    Patient Name:  Shante Mondragon    :  1985  MRN: [id number]  Restrictions/Precautions:    Diagnosis:   Diagnosis: B low back pain, L sciatica  Date of Injury/Surgery:   Treatment Diagnosis: Treatment Diagnosis: lumbar radiculopathy    Insurance/Certification information: PT Insurance Information: [place]   Referring Physician:  Referring Practitioner: Jeffery Gonzalez DO  Next Doctor Visit:    Plan of care signed (Y/N):  n  Outcome Measure:   Visit# / total visits:  3/6 initally  Pain level: 10/10 across low back, 8-9/10 down lateral to front of thigh down to the knee      Goals:       Short term goals  Time Frame for Short term goals: 3 weeks  Short term goal 1: TROM improve flexion to reach within 2 inches of ankle with minimal pain. Short term goal 2: full extension AROM  Long term goals  Time Frame for Long term goals : 6 weeks  Long term goal 1: I in home program.  Long term goal 2: decrease LE by 75% or more. Long term goal 3: stand/walk 1+ hr with minimal pain. Long term goal 4: sleep without waking due to pain    Summary of Evaluation: Assessment: Pt presents with complaints of chronic back pain dating back several years, within the last couple years he began having LLE symptoms. Symptoms are worsening, with back pain being constant, constant numb feeling in L lat thigh and intermittent tingling, pins/needles, burning in L lat thigh and L lat leg. Pain is reported with bending, walking, lifting, standing, sitting. Limitations in sitting, standing, sleeping, lifting, bending, walking, working are reported. He has loss of sensation at L lateral thigh and leg, pain limited motion of his trunk.   LLE pain limited strength at hip, thigh, and some weakness at very painful with every activity performed in the clinic. He is weak in his abdominal muscles and has a difficult time with muscle activation. He is very stiff all over.  End session pain: 10/10 LB     Plan for Next Session: trial leg pulls      Time In / Time Out:  0815/1850          Timed Code/Total Treatment Minutes:  35/35, (1) TA, (1) TE       Next Progress Note due:        Plan of Care Interventions:  [x] Therapeutic Exercise  [] Modalities:  [x] Therapeutic Activity     [] Ultrasound  [] Estim  [] Gait Training      [] Cervical Traction [x] Lumbar Traction  [x] Neuromuscular Re-education    [] Cold/hotpack [] Iontophoresis   [x] Instruction in HEP      [] Vasopneumatic   [] Dry Needling    [x] Manual Therapy               [] Aquatic Therapy              Electronically signed by:  Kevin Parra     12/29/2020, 8:15 AM

## 2020-12-31 ENCOUNTER — HOSPITAL ENCOUNTER (OUTPATIENT)
Dept: PHYSICAL THERAPY | Age: 35
Discharge: HOME | End: 2020-12-31

## 2020-12-31 NOTE — FLOWSHEET NOTE
Physical Therapy  Cancellation/No-show Note  Patient Name:  Sigrid Og  :  1985   Date:  2020  Cancelled visits to date: 1  No-shows to date: 0    For today's appointment patient:  [x]  Cancelled  []  Rescheduled appointment  []  No-show     Reason given by patient:  []  Patient ill  []  Conflicting appointment  []  No transportation    []  Conflict with work  []  No reason given  []  Other:     Comments:      Electronically signed by:  Crissy Maxwell, PT 2020, 7:54 AM

## 2021-01-07 ENCOUNTER — TELEPHONE (OUTPATIENT)
Dept: INTERNAL MEDICINE CLINIC | Age: 36
End: 2021-01-07

## 2021-01-07 RX ORDER — GABAPENTIN 300 MG/1
300 CAPSULE ORAL 2 TIMES DAILY
Qty: 60 CAPSULE | Refills: 0 | Status: SHIPPED | OUTPATIENT
Start: 2021-01-07 | End: 2022-04-28

## 2021-01-07 NOTE — TELEPHONE ENCOUNTER
Patient called had changed phone number and wanted to update. He  got message on 12/28/2020 and has not heard from pain management and did not get gabapentin to start for neuropathy. Patient given phone number to [facility] pain management and can gabapentin be called in to [facility] on [address]?

## 2021-01-09 ENCOUNTER — HOSPITAL ENCOUNTER (EMERGENCY)
Age: 36
Discharge: HOME | End: 2021-01-09
Payer: MEDICARE

## 2021-01-09 VITALS
HEIGHT: 73 IN | BODY MASS INDEX: 29.16 KG/M2 | SYSTOLIC BLOOD PRESSURE: 140 MMHG | RESPIRATION RATE: 15 BRPM | HEART RATE: 80 BPM | WEIGHT: 220 LBS | OXYGEN SATURATION: 96 % | TEMPERATURE: 98.7 F | DIASTOLIC BLOOD PRESSURE: 91 MMHG

## 2021-01-09 DIAGNOSIS — G89.29 ACUTE EXACERBATION OF CHRONIC LOW BACK PAIN: Primary | ICD-10-CM

## 2021-01-09 DIAGNOSIS — M54.50 ACUTE EXACERBATION OF CHRONIC LOW BACK PAIN: Primary | ICD-10-CM

## 2021-01-09 PROCEDURE — 96372 THER/PROPH/DIAG INJ SC/IM: CPT

## 2021-01-09 PROCEDURE — 6360000002 HC RX W HCPCS: Performed by: PHYSICIAN ASSISTANT

## 2021-01-09 PROCEDURE — 6370000000 HC RX 637 (ALT 250 FOR IP): Performed by: PHYSICIAN ASSISTANT

## 2021-01-09 PROCEDURE — 99283 EMERGENCY DEPT VISIT LOW MDM: CPT

## 2021-01-09 RX ORDER — PREDNISONE 50 MG/1
50 TABLET ORAL DAILY
Qty: 7 TABLET | Refills: 0 | Status: SHIPPED | OUTPATIENT
Start: 2021-01-09 | End: 2021-01-16

## 2021-01-09 RX ORDER — DIAZEPAM 5 MG/1
5 TABLET ORAL ONCE
Status: COMPLETED | OUTPATIENT
Start: 2021-01-09 | End: 2021-01-09

## 2021-01-09 RX ORDER — LIDOCAINE 4 G/G
1 PATCH TOPICAL DAILY
Qty: 30 PATCH | Refills: 0 | Status: SHIPPED | OUTPATIENT
Start: 2021-01-09 | End: 2021-02-08

## 2021-01-09 RX ORDER — CYCLOBENZAPRINE HCL 5 MG
5 TABLET ORAL 2 TIMES DAILY PRN
Qty: 10 TABLET | Refills: 0 | Status: SHIPPED | OUTPATIENT
Start: 2021-01-09 | End: 2021-01-19

## 2021-01-09 RX ORDER — NAPROXEN 500 MG/1
500 TABLET ORAL 2 TIMES DAILY PRN
Qty: 30 TABLET | Refills: 0 | Status: SHIPPED | OUTPATIENT
Start: 2021-01-09 | End: 2022-03-09

## 2021-01-09 RX ORDER — KETOROLAC TROMETHAMINE 30 MG/ML
30 INJECTION, SOLUTION INTRAMUSCULAR; INTRAVENOUS ONCE
Status: COMPLETED | OUTPATIENT
Start: 2021-01-09 | End: 2021-01-09

## 2021-01-09 RX ADMIN — DIAZEPAM 5 MG: 5 TABLET ORAL at 16:16

## 2021-01-09 RX ADMIN — PREDNISONE 50 MG: 20 TABLET ORAL at 16:16

## 2021-01-09 RX ADMIN — KETOROLAC TROMETHAMINE 30 MG: 30 INJECTION, SOLUTION INTRAMUSCULAR; INTRAVENOUS at 16:20

## 2021-01-09 ASSESSMENT — PAIN SCALES - GENERAL: PAINLEVEL_OUTOF10: 4

## 2021-01-09 NOTE — CARE COORDINATION
LSW informed pt has been d/c'd and has no ride home. LSW spoke to pt in ED lobby and explained CM role. Pt states his future Father-in-law dropped him off and cannot come back to get him as he is @ work and there is no other vehicle in the family. Pt offered to walk but it will take him an hour. Pt has no money. Pt states he does not work and was denied for SS x3. Pt has bi-polar d/o, PTSD, neuropathy and degenerative back disease in disc. Still, he is told he can work. Pt also notes his 1st wife left him and he has 3 kids all under 10 yo. Pt is engaged to his SO now. Pt informed LSW he has [employer] insurance. LSW noted it is usually 1-3 hrs and pt states he can wait. LSW called [employer] and spoke to [facility]. LSW coordinated for ride for pt. Conf # is [id number]. Artem noted if do not hear anything by 1850, to call back. [facility] received call from [employer]. She notes she has to cancel pt's trip. She noted there are no providers available @ this time. She cannot get a . She apologized and asked to extend this apology to pt. LSW called [employer] and set up transportation. [phone]  [facility] transport here for pt.

## 2021-01-09 NOTE — ED PROVIDER NOTES
PAST MEDICAL & SURGICAL HISTORY    Past Medical History:   Diagnosis Date    ADHD     Anxiety     Bipolar [facility] [address]     Chronic low back pain     Depression     Seizures (HCC)      Past Surgical History:   Procedure Laterality Date    EYE SURGERY      for amblyopia    TONSILLECTOMY AND ADENOIDECTOMY      TYMPANOSTOMY TUBE PLACEMENT         CURRENT MEDICATIONS    Current Outpatient Rx   Medication Sig Dispense Refill    predniSONE (DELTASONE) 50 MG tablet Take 1 tablet by mouth daily for 7 days 7 tablet 0    cyclobenzaprine (FLEXERIL) 5 MG tablet Take 1 tablet by mouth 2 times daily as needed for Muscle spasms 10 tablet 0    naproxen (NAPROSYN) 500 MG tablet Take 1 tablet by mouth 2 times daily as needed for Pain 30 tablet 0    lidocaine 4 % external patch Place 1 patch onto the skin daily 30 patch 0    gabapentin (NEURONTIN) 300 MG capsule Take 1 capsule by mouth 2 times daily for 30 days.  60 capsule 0    traZODone (DESYREL) 50 MG tablet       PARoxetine HCl (PAXIL PO) Take by mouth      methylphenidate (RITALIN) 20 MG tablet Take one and one-half a day      ARIPiprazole (ABILIFY PO) Take by mouth         ALLERGIES    Allergies   Allergen Reactions    Amoxicillin Diarrhea       SOCIAL HISTORY    Social History     Socioeconomic History    Marital status:      Spouse name: Not on file    Number of children: 3    Years of education: Not on file    Highest education level: Not on file   Occupational History    Occupation: Landscaping   Social Needs    Financial resource strain: Not on file    Food insecurity     Worry: Not on file     Inability: Not on file   [employer] needs     Medical: Not on file     Non-medical: Not on file   Tobacco Use    Smoking status: Former Smoker    Smokeless tobacco: Never Used   Substance and Sexual Activity    Alcohol use: Yes     Comment: occasional    Drug use: Not Currently     Types: Marijuana    Sexual activity: Yes Partners: Female   Lifestyle    Physical activity     Days per week: Not on file     Minutes per session: Not on file    Stress: Not on file   Relationships    Social connections     Talks on phone: Not on file     Gets together: Not on file     Attends Congregational service: Not on file     Active member of club or organization: Not on file     Attends meetings of clubs or organizations: Not on file     Relationship status: Not on file    Intimate partner violence     Fear of current or ex partner: Not on file     Emotionally abused: Not on file     Physically abused: Not on file     Forced sexual activity: Not on file   Other Topics Concern    Not on file   Social History Narrative    Not on file       PHYSICAL EXAM    VITAL SIGNS: BP (!) 140/91   Pulse 80   Temp 98.7 °F (37.1 °C) (Oral)   Resp 15   Ht 6' 1\" (1.854 m)   Wt 220 lb (99.8 kg)   SpO2 96%   BMI 29.03 kg/m²    Patient was noted to be afebrile    Constitutional:  Well developed, well nourished. HENT:  Atraumatic, moist mucus membranes. Eyes:  No orbital trauma. No scleral icterus. Neck: No JVD, supple. No enlarged lymph nodes. Cardiovascular:  Normal rate, regular rhythm, no murmurs/rubs/gallops  Respiratory:  No respiratory distress, normal breath sounds. Abdomen: Bowel sounds normal, abdomen soft, no tenderness, no masses. Musculoskeletal:  No edema, no deformities. Back:   - No gross deformity, swelling, or discoloration.    - + bilateral Paralumbar tenderness without masses, fluctuance, warmth, or skin changes. - + diffuse tenderness along lumbar spine  - No localized midline bony tenderness.   - SLR test positive bilaterally    No CVA tenderness to percussion    Integument:  Well hydrated, no rash, no pallor. Neurologic:    Decreased sensation to light touch to lateral aspect of left upper leg. Sensation otherwise intact leg including inner upper thigh bilaterally, distal legs.   Reports decreased sensation to dorsum of feet bilaterally. 5/5 strength adduction thigh, flex/extension knee, foot dorsiflexion/extension, toe extension/curling toes. 2+ patellar reflexes dana,  Vascular:  Distal pulses and capillary refill intact in bilateral lower extremities            ED COURSE & MEDICAL DECISION MAKING       Vital signs and nursing notes reviewed during ED course. I have independently evaluated this patient. Supervising MD present in the Emergency Department, available for consultation, throughout entirety of patient care. Patient presents as above with acutely worsening chronic low back pain. He is hemodynamically stable on arrival, is slightly hypertensive. Afebrile, not tachycardic, oxygenating well on room air. Does have chronic decreased sensation to left lateral leg and dorsum of feet which is unchanged. Good strength. No saddle anesthesias, bowel or bladder symptoms. No fevers or other red flag symptoms concerning for emergent spinal process. Patient is treated symptomatically in the ED. we will plan on discharging with course of prednisone, Flexeril, naproxen and Lidoderm patch. He is referred to local providers for spine follow-up. We discussed immediately returning here with any new or worsening symptoms, changing sensation or weakness. I completed a structured, evidence-based clinical evaluation to screen for acute non-traumatic spinal emergencies. The patient has a normal detailed neurologic exam and a low red flag score. The evidence indicates that the patient is very low risk for an acute spinal emergency and this is consistent with my clinical intuition. The risk of further workup is higher than the likelihood of the patient having a spinal epidural abscess or other dangerous emergency spinal condition. It is, therefore, in the patients best interest not to do additional emergent testing at this time. All pertinent lab data and radiographic results reviewed with patient at bedside.   The patient and/or the family were informed of the results of any tests/labs/imaging, the treatment plan, and time was allotted to answer questions. Patient agrees to followup with primary care provider over the next 2-3 days for recheck. Diagnosis, disposition, and plan discussed in detail with patient and/or family today who understands and agrees. Patient agrees to return emergency department if symptoms worsen or any new symptoms develop including but not limited to numbness, weakness, tingling, bowel/bladder incontinence, urine retention, fever, chills. Clinical  IMPRESSION    1.  Acute exacerbation of chronic low back pain          Disposition referral (if applicable):  [address]  [facility] [phone]    Schedule an appointment as soon as possible for a visit in 1 day  For recheck of symptoms treated for today    Luis E Riggins MD  [facility] [address]  [phone]    Schedule an appointment as soon as possible for a visit   for spine specialist follow up    Ines Bennett MD  [address] [place] [address] [phone]      neurosurgery follow up    [facility] Emergency Department  [place] [id number] [phone]  Go to   As needed, If symptoms worsen      Disposition medications (if applicable):  Discharge Medication List as of 2021  4:14 PM      START taking these medications    Details   predniSONE (DELTASONE) 50 MG tablet Take 1 tablet by mouth daily for 7 days, Disp-7 tablet, R-0Normal      cyclobenzaprine (FLEXERIL) 5 MG tablet Take 1 tablet by mouth 2 times daily as needed for Muscle spasms, Disp-10 tablet, R-0Normal      naproxen (NAPROSYN) 500 MG tablet Take 1 tablet by mouth 2 times daily as needed for Pain, Disp-30 tablet, R-0Normal      lidocaine 4 % external patch Place 1 patch onto the skin daily, Transdermal, DAILY Starting Sat 2021, Until Mon 2/8/2021, For 30 days, Disp-30 patch, R-0, Normal               Comment: Please note this report has been produced using speech recognition software and may contain errors related to that system including errors in grammar, punctuation, and spelling, as well as words and phrases that may be inappropriate. If there are any questions or concerns please feel free to contact the dictating provider for clarification.         Belma Cowden, PA  01/09/21 2631

## 2021-05-02 ENCOUNTER — APPOINTMENT (OUTPATIENT)
Dept: GENERAL RADIOLOGY | Age: 36
End: 2021-05-02
Payer: MEDICARE

## 2021-05-02 ENCOUNTER — HOSPITAL ENCOUNTER (EMERGENCY)
Age: 36
Discharge: HOME | End: 2021-05-02
Payer: MEDICARE

## 2021-05-02 VITALS
HEIGHT: 73 IN | HEART RATE: 93 BPM | TEMPERATURE: 98 F | OXYGEN SATURATION: 100 % | SYSTOLIC BLOOD PRESSURE: 119 MMHG | DIASTOLIC BLOOD PRESSURE: 80 MMHG | BODY MASS INDEX: 29.82 KG/M2 | WEIGHT: 225 LBS | RESPIRATION RATE: 19 BRPM

## 2021-05-02 DIAGNOSIS — J06.9 ACUTE UPPER RESPIRATORY INFECTION: Primary | ICD-10-CM

## 2021-05-02 DIAGNOSIS — R05.9 COUGH: ICD-10-CM

## 2021-05-02 LAB — SARS-COV-2, NAAT: NOT DETECTED

## 2021-05-02 PROCEDURE — 87635 SARS-COV-2 COVID-19 AMP PRB: CPT

## 2021-05-02 PROCEDURE — 71046 X-RAY EXAM CHEST 2 VIEWS: CPT

## 2021-05-02 PROCEDURE — 99284 EMERGENCY DEPT VISIT MOD MDM: CPT

## 2021-05-02 RX ORDER — DEXTROMETHORPHAN HYDROBROMIDE AND PROMETHAZINE HYDROCHLORIDE 15; 6.25 MG/5ML; MG/5ML
5 SYRUP ORAL 4 TIMES DAILY PRN
Qty: 90 ML | Refills: 0 | Status: SHIPPED | OUTPATIENT
Start: 2021-05-02 | End: 2021-05-09

## 2021-05-02 RX ORDER — LIDOCAINE HYDROCHLORIDE 20 MG/ML
5 INJECTION, SOLUTION EPIDURAL; INFILTRATION; INTRACAUDAL; PERINEURAL ONCE
Status: DISCONTINUED | OUTPATIENT
Start: 2021-05-02 | End: 2021-05-02 | Stop reason: HOSPADM

## 2021-05-02 NOTE — ED PROVIDER NOTES
EMERGENCY DEPARTMENT ENCOUNTER      PCP: Dunia Gr, [address] [facility]    Chief Complaint   Patient presents with    Shortness of Breath    Cough         This patient was not evaluated by the attending physician. I have independently evaluated this patient . HPI    Farida Virgen is a 38 y.o. male who presents with nasal congestion, runny nose, cough. Onset 4 days. Context is patient has the above symptoms for 4 days with several sick contacts at home with similar symptoms. Patient does note associated shortness of breath during coughing episodes only, otherwise no shortness of breath, chest pain, palpitations. Cough is productive of yellow sputum occasionally. No known COVID-19 contacts. REVIEW OF SYSTEMS    Constitutional:  Denies fever, chills  HEENT:  See above  Cardiovascular:  Denies chest pain, palpitations. Respiratory:    GI:  Denies abdominal pain, vomiting, or diarrhea   Neurologic:  Denies confusion, light headedness, dizziness, or syncope   Skin:  No rash    All other review of systems are negative  See HPI and nursing notes for additional information       PAST MEDICAL AND SURGICAL HISTORY    Past Medical History:   Diagnosis Date    ADHD     Anxiety     [facility] disorder ([facility] [address]     Chronic low back pain     Depression     Seizures ([facility] [address]      Past Surgical History:   Procedure Laterality Date    EYE SURGERY      for amblyopia    TONSILLECTOMY AND ADENOIDECTOMY      TYMPANOSTOMY TUBE PLACEMENT         CURRENT MEDICATIONS    Current Outpatient Rx   Medication Sig Dispense Refill    promethazine-dextromethorphan (PROMETHAZINE-DM) 6.25-15 MG/5ML syrup Take 5 mLs by mouth 4 times daily as needed for Cough 90 mL 0    naproxen (NAPROSYN) 500 MG tablet Take 1 tablet by mouth 2 times daily as needed for Pain 30 tablet 0    gabapentin (NEURONTIN) 300 MG capsule Take 1 capsule by mouth 2 times daily for 30 days.  60 capsule 0    traZODone (DESYREL) 50 MG tablet       PARoxetine HCl (PAXIL PO) Take by mouth      methylphenidate (RITALIN) 20 MG tablet Take one and one-half a day      ARIPiprazole (ABILIFY PO) Take by mouth         ALLERGIES    Allergies   Allergen Reactions    Amoxicillin Diarrhea       FAMILY AND SOCIAL HISTORY    Family History   Problem Relation Age of Onset    Ovarian Cancer Mother     Bipolar Disorder Mother     Lung Cancer Father     Other Brother         cerebral palsy    Diabetes Maternal Grandmother     Diabetes Paternal Grandmother      Social History     Socioeconomic History    Marital status:      Spouse name: None    Number of children: 3    Years of education: None    Highest education level: None   Occupational History    Occupation: [employer]   Social Needs    Financial resource strain: None    Food insecurity     Worry: None     Inability: None    Transportation needs     Medical: None     Non-medical: None   Tobacco Use    Smoking status: Former Smoker    Smokeless tobacco: Never Used   Substance and Sexual Activity    Alcohol use: Yes     Comment: occasional    Drug use: Not Currently     Types: Marijuana    Sexual activity: Yes     Partners: Female   Lifestyle    Physical activity     Days per week: None     Minutes per session: None    Stress: None   Relationships    Social connections     Talks on phone: None     Gets together: None     Attends [employer] service: None     Active member of club or organization: None     Attends meetings of clubs or organizations: None     Relationship status: None    Intimate partner violence     Fear of current or ex partner: None     Emotionally abused: None     Physically abused: None     Forced sexual activity: None   Other Topics Concern    None   Social History Narrative    None       PHYSICAL EXAM    VITAL SIGNS: /85   Pulse 98   Temp 98 °F (36.7 °C) (Oral)   Resp 16   Ht 6' 1\" (1.854 m)   Wt 225 lb (102.1 kg)   SpO2 96%   BMI 29.69 kg/m² Constitutional:  Well developed, well nourished, no acute distress, non-toxic appearance   Eyes: Conjunctiva normal, sclera non-icteric  HEENT:     - Normocephalic, atraumatic   - PERRL, EOM intact. Conjunctiva normal    -  Frontal/Maxillary sinuses NONtender to percussion.   - External auditory canals  clear   - TMs clear without discharge, fluid, or bulging.   - Nasal passages with mildly erythematous and edematous turbinates. No massess.   - Oropharynx mildly erythematous without tonsillar hypertrophy or exudate. Neck/Lymphatics:    - supple, no JVD, no swollen nodes     Respiratory:     Nonlabored breathing - No retractions, no accessory muscle use   Clear to auscultation bilateral lung fields, no wheezes/rales/rhonchi. Cardiovascular:  Normal rate, normal rhythm   GI:  Soft, no abdominal tenderness, no guarding. Musculoskeletal:  No obvious deficits, no edema   Integument:  Skin pink, warm, dry, intact         LABS:  Results for orders placed or performed during the hospital encounter of 05/02/21   COVID-19, Rapid    Specimen: Nasopharyngeal   Result Value Ref Range    SARS-CoV-2, NAAT NOT DETECTED NOT DETECTED           RADIOLOGY/PROCEDURES    XR CHEST (2 VW)   Final Result   Negative chest.                   ED COURSE & MEDICAL DECISION MAKING        Patient presents as above. Patient is afebrile, nontoxic appearing, and well hydrated. Vital signs stable, HR initially elevated - Pt states that he drank an energy drink prior to arrival.  Patient observed throughout ED course and heart rate normalizes to low 90s on serial rechecks. Lung sounds clear. Chest x-ray negative for acute, COVID19 test negative.         I discussed the likely viral etiology of patient's symptoms with patient today and recommend symptomatic treatment along with focusing on hydration and rest.      Follow up with PCP within the next several days for recheck  Symptomatic treatment provided, recommend rest and fluids. Return to ED precautions reviewed with patient prior to discharge. Clinical  IMPRESSION    1. Acute upper respiratory infection    2. Cough          Comment: Please note this report has been produced using speech recognition software and may contain errors related to that system including errors in grammar, punctuation, and spelling, as well as words and phrases that may be inappropriate. If there are any questions or concerns please feel free to contact the dictating provider for clarification.                            Edilia Lay  05/02/21 9348

## 2021-05-22 ENCOUNTER — HOSPITAL ENCOUNTER (EMERGENCY)
Age: 36
Discharge: HOME | End: 2021-05-22
Attending: EMERGENCY MEDICINE
Payer: MEDICARE

## 2021-05-22 VITALS
TEMPERATURE: 97.9 F | SYSTOLIC BLOOD PRESSURE: 139 MMHG | HEART RATE: 87 BPM | DIASTOLIC BLOOD PRESSURE: 101 MMHG | OXYGEN SATURATION: 98 % | BODY MASS INDEX: 30.03 KG/M2 | WEIGHT: 234 LBS | RESPIRATION RATE: 16 BRPM | HEIGHT: 74 IN

## 2021-05-22 DIAGNOSIS — F19.10 POLYSUBSTANCE ABUSE (HCC): Primary | ICD-10-CM

## 2021-05-22 DIAGNOSIS — R44.0 AUDITORY HALLUCINATIONS: ICD-10-CM

## 2021-05-22 PROCEDURE — 93005 ELECTROCARDIOGRAM TRACING: CPT | Performed by: EMERGENCY MEDICINE

## 2021-05-22 PROCEDURE — 99285 EMERGENCY DEPT VISIT HI MDM: CPT

## 2021-05-22 NOTE — ED PROVIDER NOTES
Triage Chief Complaint:   Drug Overdose (snorted 20 20mg Ritalin throughout the night, starting at 830pm. Denies SI/HI, reports recreational use. Reports feeling tired and \"yucky\" right now. ) and Hallucinations (reports seeing things d/t \"being up for 36 hours\". )    [place]:  Keisha Avila is a 38 y.o. male that presents for concerns over drug overuse of Ritalin. States that starting around 8:30 PM last night he snorted about 20 Ritalin tablets. States that this was for recreational use because he has had some worsening auditory hallucinations and now feels that he is having difficulty sleeping. States that he is now starting to get tired. States the auditory hallucinations have been on and off for years at this point. States that he has been compliant with his other psychiatric medications for bipolar disorder and ADHD. States that he went to mental health and was instructed to come here. States that he just wants some of his medications changed around so that he can start feeling better. Denies any suicidal or homicidal ideation. Denies other drug or alcohol use except for marijuana. ROS:  At least 10 systems reviewed and otherwise acutely negative except as in the [address]     Past Medical History:   Diagnosis Date    ADHD     Anxiety     Bipolar 2 disorder (HCC)     Chronic low back pain     Depression     Seizures (HCC)      Past Surgical History:   Procedure Laterality Date    EYE SURGERY      for amblyopia    TONSILLECTOMY AND ADENOIDECTOMY      TYMPANOSTOMY TUBE PLACEMENT       Family History   Problem Relation Age of Onset    Ovarian Cancer Mother     Bipolar Disorder Mother     Lung Cancer Father     Other Brother         cerebral palsy    Diabetes Maternal Grandmother     Diabetes Paternal Grandmother      Social History     Socioeconomic History    Marital status:      Spouse name: Not on file    Number of children: 3    Years of education: Not on file    Highest education level: Not on file   Occupational History    Occupation: [employer]   Tobacco Use    Smoking status: Former Smoker    Smokeless tobacco: Never Used   Substance and Sexual Activity    Alcohol use: Yes     Comment: occasional    Drug use: Not Currently     Types: Marijuana    Sexual activity: Yes     Partners: Female   Other Topics Concern    Not on file   Social History Narrative    Not on file     Social Determinants of Health     Financial Resource Strain:     Difficulty of Paying Living Expenses:    Food Insecurity:     Worried About Running Out of Food in the Last Year:     [address] in the Last Year:    Transportation Needs:     Lack of Transportation (Medical):  Lack of Transportation (Non-Medical):    Physical Activity:     Days of Exercise per Week:     Minutes of Exercise per Session:    Stress:     Feeling of Stress :    Social Connections:     Frequency of Communication with Friends and Family:     Frequency of Social Gatherings with Friends and Family:     Attends Yazidi Services:     Active Member of Clubs or Organizations:     Attends Club or Organization Meetings:     Marital Status:    Intimate Partner Violence:     Fear of Current or Ex-Partner:     Emotionally Abused:     Physically Abused:     Sexually Abused:      No current facility-administered medications for this encounter. Current Outpatient Medications   Medication Sig Dispense Refill    naproxen (NAPROSYN) 500 MG tablet Take 1 tablet by mouth 2 times daily as needed for Pain 30 tablet 0    gabapentin (NEURONTIN) 300 MG capsule Take 1 capsule by mouth 2 times daily for 30 days.  60 capsule 0    traZODone (DESYREL) 50 MG tablet       PARoxetine HCl (PAXIL PO) Take by mouth      methylphenidate (RITALIN) 20 MG tablet Take one and one-half a day      ARIPiprazole (ABILIFY PO) Take by mouth       Allergies   Allergen Reactions    Amoxicillin Diarrhea       Nursing Notes Reviewed    Physical Exam:  ED Triage Vitals   Enc Vitals Group      BP 05/22/21 1608 (!) 159/98      Pulse 05/22/21 1608 111      Resp 05/22/21 1608 18      Temp 05/22/21 1613 98.4 °F (36.9 °C)      Temp Source 05/22/21 1613 Oral      SpO2 05/22/21 1608 95 %      Weight --       Height --       Head Circumference --       Peak Flow --       Pain Score --       Pain Loc --       Pain Edu? --       Excl. in [address]? --      GENERAL APPEARANCE: Awake and alert. Cooperative. No acute distress. HEAD: Normocephalic. Atraumatic. EYES: EOM's grossly intact. Sclera anicteric. ENT: Mucous membranes are moist. Tolerates saliva. No trismus. NECK: No meningismus. HEART:  Extremities pink  LUNGS: Respirations unlabored. Even chest rise bilaterally  ABDOMEN: Non distended. EXTREMITIES: No acute deformities. SKIN: Dry  NEUROLOGICAL: No gross facial drooping. Moves all 4 extremities spontaneously. PSYCHIATRIC: Normal mood. Appropriate affect. Denies SI or HI. Linear thought process. I have reviewed and interpreted all of the currently available lab results from this visit (if applicable):  No results found for this visit on 05/22/21. Radiographs (if obtained):  [] The following radiograph was interpreted by myself in the absence of a radiologist:  [] Radiologist's Report Reviewed:    EKG (if obtained): (All EKG's are interpreted by myself in the absence of a cardiologist)    MDM:  Plan of care is discussed thoroughly with the patient and family if present. If performed, all imaging and lab work also discussed with patient. All relevant prior results and chart reviewed if available. Patient presents as above. He is in no acute distress. He is mildly tachycardic likely secondary from Ritalin use. Also states that he uses cannabinoids. He does not have any signs or symptoms consistent with acute emergent psychiatric condition that requires urgent hospitalization or evaluation in the emergency department. No other drug use or coingestions at this time and he is almost 24 hours out from initiation of Ritalin use. He not feel that he needs further work-up. In the emergency department at this time. Agrees to follow-up with his psychiatrist tomorrow. Discharged in good condition and encouraged to return if he has any new complaints. Clinical Impression:  1. Polysubstance abuse ([address]    2.  Auditory hallucinations      (Please note that portions of this note may have been completed with a voice recognition program. Efforts were made to edit the dictations but occasionally words are mis-transcribed.)    MD Nirmal Salcido MD  05/22/21 9550

## 2021-05-23 LAB
EKG ATRIAL RATE: 108 BPM
EKG DIAGNOSIS: NORMAL
EKG P AXIS: 56 DEGREES
EKG P-R INTERVAL: 136 MS
EKG Q-T INTERVAL: 356 MS
EKG QRS DURATION: 96 MS
EKG QTC CALCULATION (BAZETT): 477 MS
EKG R AXIS: 47 DEGREES
EKG T AXIS: 53 DEGREES
EKG VENTRICULAR RATE: 108 BPM

## 2021-05-23 PROCEDURE — 93010 ELECTROCARDIOGRAM REPORT: CPT | Performed by: INTERNAL MEDICINE

## 2021-08-03 ENCOUNTER — APPOINTMENT (OUTPATIENT)
Dept: GENERAL RADIOLOGY | Age: 36
End: 2021-08-03
Payer: MEDICARE

## 2021-08-03 ENCOUNTER — HOSPITAL ENCOUNTER (EMERGENCY)
Age: 36
Discharge: HOME | End: 2021-08-03
Attending: EMERGENCY MEDICINE
Payer: MEDICARE

## 2021-08-03 VITALS
SYSTOLIC BLOOD PRESSURE: 131 MMHG | OXYGEN SATURATION: 98 % | WEIGHT: 230 LBS | RESPIRATION RATE: 16 BRPM | TEMPERATURE: 98.2 F | HEART RATE: 97 BPM | BODY MASS INDEX: 29.52 KG/M2 | HEIGHT: 74 IN | DIASTOLIC BLOOD PRESSURE: 84 MMHG

## 2021-08-03 DIAGNOSIS — S80.11XA LEG HEMATOMA, RIGHT, INITIAL ENCOUNTER: ICD-10-CM
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
## 2021-08-03 LAB
ALBUMIN SERPL-MCNC: 4.2 GM/DL (ref 3.4–5)
ALP BLD-CCNC: 98 IU/L (ref 40–129)
ALT SERPL-CCNC: 32 U/L (ref 10–40)
ANION GAP SERPL CALCULATED.3IONS-SCNC: 9 MMOL/L (ref 4–16)
AST SERPL-CCNC: 40 IU/L (ref 15–37)
BACTERIA: NEGATIVE /HPF
BASOPHILS ABSOLUTE: 0 K/CU MM
BASOPHILS RELATIVE PERCENT: 0.3 % (ref 0–1)
BILIRUB SERPL-MCNC: 0.5 MG/DL (ref 0–1)
BILIRUBIN URINE: NEGATIVE MG/DL
BLOOD, URINE: NEGATIVE
BUN BLDV-MCNC: 13 MG/DL (ref 6–23)
CALCIUM SERPL-MCNC: 8.5 MG/DL (ref 8.3–10.6)
CHLORIDE BLD-SCNC: 105 MMOL/L (ref 99–110)
CLARITY: CLEAR
CO2: 25 MMOL/L (ref 21–32)
COLOR: YELLOW
CREAT SERPL-MCNC: 0.9 MG/DL (ref 0.9–1.3)
DIFFERENTIAL TYPE: ABNORMAL
EOSINOPHILS ABSOLUTE: 0.2 K/CU MM
EOSINOPHILS RELATIVE PERCENT: 2.4 % (ref 0–3)
GFR AFRICAN AMERICAN: >60 ML/MIN/1.73M2
GFR NON-AFRICAN AMERICAN: >60 ML/MIN/1.73M2
GLUCOSE BLD-MCNC: 94 MG/DL (ref 70–99)
GLUCOSE, URINE: NEGATIVE MG/DL
HCT VFR BLD CALC: 39.4 % (ref 42–52)
HEMOGLOBIN: 14.2 GM/DL (ref 13.5–18)
IMMATURE NEUTROPHIL %: 0.6 % (ref 0–0.43)
KETONES, URINE: NEGATIVE MG/DL
LEUKOCYTE ESTERASE, URINE: NEGATIVE
LYMPHOCYTES ABSOLUTE: 2.1 K/CU MM
LYMPHOCYTES RELATIVE PERCENT: 31.1 % (ref 24–44)
MCH RBC QN AUTO: 32.6 PG (ref 27–31)
MCHC RBC AUTO-ENTMCNC: 36 % (ref 32–36)
MCV RBC AUTO: 90.6 FL (ref 78–100)
MONOCYTES ABSOLUTE: 0.6 K/CU MM
MONOCYTES RELATIVE PERCENT: 9.4 % (ref 0–4)
NITRITE URINE, QUANTITATIVE: NEGATIVE
NUCLEATED RBC %: 0 %
PDW BLD-RTO: 11.8 % (ref 11.7–14.9)
PH, URINE: 6 (ref 5–8)
PLATELET # BLD: 262 K/CU MM (ref 140–440)
PMV BLD AUTO: 9.5 FL (ref 7.5–11.1)
POTASSIUM SERPL-SCNC: 3.3 MMOL/L (ref 3.5–5.1)
PROTEIN UA: NEGATIVE MG/DL
RBC # BLD: 4.35 M/CU MM (ref 4.6–6.2)
RBC URINE: NORMAL /HPF (ref 0–3)
SEGMENTED NEUTROPHILS ABSOLUTE COUNT: 3.8 K/CU MM
SEGMENTED NEUTROPHILS RELATIVE PERCENT: 56.2 % (ref 36–66)
SODIUM BLD-SCNC: 139 MMOL/L (ref 135–145)
SPECIFIC GRAVITY UA: 1.01 (ref 1–1.03)
SQUAMOUS EPITHELIAL: <1 /HPF
TOTAL CK: 1026 IU/L (ref 38–174)
TOTAL IMMATURE NEUTOROPHIL: 0.04 K/CU MM
TOTAL NUCLEATED RBC: 0 K/CU MM
TOTAL PROTEIN: 6.7 GM/DL (ref 6.4–8.2)
TRICHOMONAS: NORMAL /HPF
UROBILINOGEN, URINE: NEGATIVE MG/DL (ref 0.2–1)
WBC # BLD: 6.7 K/CU MM (ref 4–10.5)
WBC UA: 1 /HPF (ref 0–2)

## 2021-08-03 PROCEDURE — 73552 X-RAY EXAM OF FEMUR 2/>: CPT

## 2021-08-03 PROCEDURE — 72100 X-RAY EXAM L-S SPINE 2/3 VWS: CPT

## 2021-08-03 PROCEDURE — 99284 EMERGENCY DEPT VISIT MOD MDM: CPT

## 2021-08-03 PROCEDURE — 80053 COMPREHEN METABOLIC PANEL: CPT

## 2021-08-03 PROCEDURE — 85025 COMPLETE CBC W/AUTO DIFF WBC: CPT

## 2021-08-03 PROCEDURE — 73502 X-RAY EXAM HIP UNI 2-3 VIEWS: CPT

## 2021-08-03 PROCEDURE — 2580000003 HC RX 258: Performed by: PHYSICIAN ASSISTANT

## 2021-08-03 PROCEDURE — 81001 URINALYSIS AUTO W/SCOPE: CPT

## 2021-08-03 PROCEDURE — 6370000000 HC RX 637 (ALT 250 FOR IP): Performed by: PHYSICIAN ASSISTANT

## 2021-08-03 PROCEDURE — 82550 ASSAY OF CK (CPK): CPT

## 2021-08-03 PROCEDURE — 96360 HYDRATION IV INFUSION INIT: CPT

## 2021-08-03 RX ORDER — IBUPROFEN 600 MG/1
600 TABLET ORAL EVERY 6 HOURS PRN
Qty: 20 TABLET | Refills: 0 | Status: SHIPPED | OUTPATIENT
Start: 2021-08-03 | End: 2022-04-28

## 2021-08-03 RX ORDER — 0.9 % SODIUM CHLORIDE 0.9 %
1000 INTRAVENOUS SOLUTION INTRAVENOUS ONCE
Status: COMPLETED | OUTPATIENT
Start: 2021-08-03 | End: 2021-08-03

## 2021-08-03 RX ORDER — HYDROCODONE BITARTRATE AND ACETAMINOPHEN 5; 325 MG/1; MG/1
1 TABLET ORAL ONCE
Status: COMPLETED | OUTPATIENT
Start: 2021-08-03 | End: 2021-08-03

## 2021-08-03 RX ADMIN — HYDROCODONE BITARTRATE AND ACETAMINOPHEN 1 TABLET: 5; 325 TABLET ORAL at 11:46

## 2021-08-03 RX ADMIN — SODIUM CHLORIDE 1000 ML: 9 INJECTION, SOLUTION INTRAVENOUS at 14:03

## 2021-08-03 ASSESSMENT — PAIN DESCRIPTION - PAIN TYPE: TYPE: ACUTE PAIN

## 2021-08-03 ASSESSMENT — PAIN SCALES - GENERAL
PAINLEVEL_OUTOF10: 8
PAINLEVEL_OUTOF10: 8
PAINLEVEL_OUTOF10: 5

## 2021-08-03 NOTE — ED NOTES
Patient is filling out Worker's Comp paperwork now     Inez Ackerman, [facility]  08/03/21 9701

## 2021-08-04 NOTE — ED PROVIDER NOTES
EMERGENCY DEPARTMENT ENCOUNTER      PCP: Concha Lock DO    CHIEF COMPLAINT    Chief Complaint   Patient presents with    Fall     tripped and fell running from a dog on saturday. c/o R hip pain, R thigh pain. abrasion to R forearm     Of note, this patient was also evaluated by the attending physician, Dr. Von Danielle    HPI    Sandrine Ignacio is a 38 y.o. male who presents to the emergency department today with a chief complaint of right hip and leg swelling with associated hematoma. He states that several days ago he was running from a drawer when he tripped and fell and landed on his right side. Since then he has had significant bruising throughout the right lower extremity, has become more painful in nature. Has difficulty ambulating. Has no paresthesias, coldness, weakness in the right lower extremity. Is worsening pain, unable to bear weight. REVIEW OF SYSTEMS    General: Denies fever or chills  Cardiac: Denies chest pain  Pulmonary: Denies shortness of breath  GI: Denies abdominal pain, vomiting, or diarrhea  : No dysuria or hematuria  Neuro:  Denies preceding or subsequent numbness, tingling, weakness. Denies headache  Denies any other muscles skeletal injuries, including chest wall and back.   All other review of systems are negative  See HPI and nursing notes for additional information     PAST MEDICAL & SURGICAL HISTORY    Past Medical History:   Diagnosis Date    ADHD     Anxiety     Bipolar 2 disorder ([facility] [address]     Chronic low back pain     Depression     Seizures (HCC)      Past Surgical History:   Procedure Laterality Date    EYE SURGERY      for amblyopia    TONSILLECTOMY AND ADENOIDECTOMY      TYMPANOSTOMY TUBE PLACEMENT         CURRENT MEDICATIONS    Current Outpatient Rx   Medication Sig Dispense Refill    ibuprofen (IBU) 600 MG tablet Take 1 tablet by mouth every 6 hours as needed for Pain 20 tablet 0    naproxen (NAPROSYN) 500 MG tablet Take 1 tablet by mouth 2 times daily as needed for Pain 30 tablet 0    gabapentin (NEURONTIN) 300 MG capsule Take 1 capsule by mouth 2 times daily for 30 days. 60 capsule 0    traZODone (DESYREL) 50 MG tablet       PARoxetine HCl (PAXIL PO) Take by mouth      methylphenidate (RITALIN) 20 MG tablet Take one and one-half a day      ARIPiprazole (ABILIFY PO) Take by mouth         ALLERGIES    Allergies   Allergen Reactions    Amoxicillin Diarrhea       SOCIAL & FAMILY HISTORY    Social History     Socioeconomic History    Marital status:      Spouse name: None    Number of children: 3    Years of education: None    Highest education level: None   Occupational History    Occupation: [employer]   Tobacco Use    Smoking status: Former Smoker    Smokeless tobacco: Never Used   Substance and Sexual Activity    Alcohol use: Yes     Comment: occasional    Drug use: Not Currently     Types: Marijuana    Sexual activity: Yes     Partners: Female   Other Topics Concern    None   Social History Narrative    None     Social Determinants of Health     Financial Resource Strain:     Difficulty of Paying Living Expenses:    Food Insecurity:     Worried About Running Out of Food in the Last Year:     Ran Out of Food in the Last Year:    Transportation Needs:     Lack of Transportation (Medical):      Lack of Transportation (Non-Medical):    Physical Activity:     Days of Exercise per Week:     Minutes of Exercise per Session:    Stress:     Feeling of Stress :    Social Connections:     Frequency of Communication with Friends and Family:     Frequency of Social Gatherings with Friends and Family:     Attends [employer] Services:     Active Member of Clubs or Organizations:     Attends Club or Organization Meetings:     Marital Status:    Intimate Partner Violence:     Fear of Current or Ex-Partner:     Emotionally Abused:     Physically Abused:     Sexually Abused:      Family History   Problem Relation Age of Onset    Ovarian Cancer Mother     Bipolar Disorder Mother     Lung Cancer Father     Other Brother         cerebral palsy    Diabetes Maternal Grandmother     Diabetes Paternal Grandmother            PHYSICAL EXAM    VITAL SIGNS: /84   Pulse 97   Temp 98.2 °F (36.8 °C) (Oral)   Resp 16   Ht 6' 2\" (1.88 m)   Wt 230 lb (104.3 kg)   SpO2 98%   BMI 29.53 kg/m²   Constitutional:  Well developed, Appears comfortable  ENT:  Atraumatic. PERRL, EOMI. Musculoskeletal:    Right hip/femur exam.         -Inspection:   There is a significant bruise throughout the lateral aspect of the hip in the upper femur which is exquisitely tender. No obvious warmth. No open wounds. - Palpation: General tenderness throughout the lateral hip, lateral femur. Compartments soft        -ROM:  Adequate active and passive range of motion at the hip joint with flexion extension, internal and external rotation. Adequate flexion extension at the knee joint. No swelling, discoloration, or tenderness to palpation of the ipsilateral knee    Remaining lower and upper extremity exam as well as chest wall & back exam without abnormality. Respiratory:  Lungs clear. Vascular: Distal pulses (DP, PT) intact on affected side. Integument:  Well hydrated, no rash   Neurologic:  Awake and alert, normal flow ofspeech. Cranial nerves II through XII intact. No obvious focal deficits. .  Distal sensation, motor, capillary refill intact.   Psychiatric: Cooperative, pleasant affect    Labs  Results for orders placed or performed during the hospital encounter of 08/03/21   CBC auto diff   Result Value Ref Range    WBC 6.7 4.0 - 10.5 K/CU MM    RBC 4.35 (L) 4.6 - 6.2 M/CU MM    Hemoglobin 14.2 13.5 - 18.0 GM/DL    Hematocrit 39.4 (L) 42 - 52 %    MCV 90.6 78 - 100 FL    MCH 32.6 (H) 27 - 31 PG    MCHC 36.0 32.0 - 36.0 %    RDW 11.8 11.7 - 14.9 %    Platelets 791 630 - 401 K/CU MM    MPV 9.5 7.5 - 11.1 FL    Differential Type AUTOMATED DIFFERENTIAL     Segs Relative 56.2 36 - 66 %    Lymphocytes % 31.1 24 - 44 %    Monocytes % 9.4 (H) 0 - 4 %    Eosinophils % 2.4 0 - 3 %    Basophils % 0.3 0 - 1 %    Segs Absolute 3.8 K/CU MM    Lymphocytes Absolute 2.1 K/CU MM    Monocytes Absolute 0.6 K/CU MM    Eosinophils Absolute 0.2 K/CU MM    Basophils Absolute 0.0 K/CU MM    Nucleated RBC % 0.0 %    Total Nucleated RBC 0.0 K/CU MM    Total Immature Neutrophil 0.04 K/CU MM    Immature Neutrophil % 0.6 (H) 0 - 0.43 %   CMP   Result Value Ref Range    Sodium 139 135 - 145 MMOL/L    Potassium 3.3 (L) 3.5 - 5.1 MMOL/L    Chloride 105 99 - 110 mMol/L    CO2 25 21 - 32 MMOL/L    BUN 13 6 - 23 MG/DL    CREATININE 0.9 0.9 - 1.3 MG/DL    Glucose 94 70 - 99 MG/DL    Calcium 8.5 8.3 - 10.6 MG/DL    Albumin 4.2 3.4 - 5.0 GM/DL    Total Protein 6.7 6.4 - 8.2 GM/DL    Total Bilirubin 0.5 0.0 - 1.0 MG/DL    ALT 32 10 - 40 U/L    AST 40 (H) 15 - 37 IU/L    Alkaline Phosphatase 98 40 - 129 IU/L    GFR Non-African American >60 >60 mL/min/1.73m2    GFR African American >60 >60 mL/min/1.73m2    Anion Gap 9 4 - 16   CK   Result Value Ref Range    Total CK 1,026 (H) 38 - 174 IU/L   Urinalysis (Lab)   Result Value Ref Range    Color, UA YELLOW YELLOW    Clarity, UA CLEAR CLEAR    Glucose, Urine NEGATIVE NEGATIVE MG/DL    Bilirubin Urine NEGATIVE NEGATIVE MG/DL    Ketones, Urine NEGATIVE NEGATIVE MG/DL    Specific Gravity, UA 1.013 1.001 - 1.035    Blood, Urine NEGATIVE NEGATIVE    pH, Urine 6.0 5.0 - 8.0    Protein, UA NEGATIVE NEGATIVE MG/DL    Urobilinogen, Urine NEGATIVE 0.2 - 1.0 MG/DL    Nitrite Urine, Quantitative NEGATIVE NEGATIVE    Leukocyte Esterase, Urine NEGATIVE NEGATIVE    RBC, UA NONE SEEN 0 - 3 /HPF    WBC, UA 1 0 - 2 /HPF    Bacteria, UA NEGATIVE NEGATIVE /HPF    Squam Epithel, UA <1 /HPF    Trichomonas, UA NONE SEEN NONE SEEN /HPF         RADIOLOGY/PROCEDURES    XR LUMBAR SPINE (2-3 VIEWS)    Result Date: 8/3/2021  EXAMINATION: THREE XRAY VIEWS OF THE LUMBAR SPINE 8/3/2021 12:43 pm COMPARISON: 12/03/2020 HISTORY: ORDERING SYSTEM PROVIDED HISTORY: low back pain TECHNOLOGIST PROVIDED HISTORY: Reason for exam:->low back pain Reason for Exam: fall FINDINGS: There 5 non-rib-bearing lumbar vertebral bodies. Mild degenerative change in the SI joints bilaterally. Posterior vertebral body alignment appears intact. Mild degenerative disc disease at L5-S1. No acute fracture. No spondylolisthesis is identified. Mild bilateral degenerative arthritic change in the sacroiliac joints which appear symmetric without erosive change. Degenerative disc disease at the lumbosacral junction. XR FEMUR RIGHT (MIN 2 VIEWS)    Result Date: 8/3/2021  EXAMINATION: 4 XRAY VIEWS OF THE RIGHT FEMUR 8/3/2021 12:43 pm COMPARISON: None. HISTORY: ORDERING SYSTEM PROVIDED HISTORY: hematoma right leg TECHNOLOGIST PROVIDED HISTORY: Reason for exam:->hematoma right leg Reason for Exam: hematoma right leg FINDINGS: Four views of the right femur demonstrate no acute fracture or dislocation. Alignment is anatomic. Subcutaneous edema adjacent to the greater trochanter. No subcutaneous gas identified. No radiopaque foreign body evident. 1. No acute fracture identified. 2. Subcutaneous edema adjacent to the greater trochanter. XR HIP 2-3 VW W PELVIS RIGHT    Result Date: 8/3/2021  EXAMINATION: ONE XRAY VIEW OF THE PELVIS AND TWO XRAY VIEWS RIGHT HIP 8/3/2021 10:26 am COMPARISON: Correlation is made to CT of the abdomen and pelvis dated May 13, 2019 HISTORY: ORDERING SYSTEM PROVIDED HISTORY: fall TECHNOLOGIST PROVIDED HISTORY: Reason for exam:->fall Reason for Exam: pain     abrasions      bruising Acuity: Acute Type of Exam: Initial Mechanism of Injury: chased by dog and fell x 3 days ago FINDINGS: Pelvis: Symphysis pubis interval is normal.  Sacroiliac joints are unremarkable. Sacral arcuate lines are uninterrupted. Femoral heads align normally with the acetabula. Osseous pelvis is intact.  Right hip: Femoral head aligns normally with the acetabulum. No fracture, dislocation, or periosteal change. No radiographic evidence of avascular necrosis. Corticated ossification along the lateral margin of the acetabular roof is a chronic finding. 1. No acute radiographic finding to account for patient's right hip pain. ED COURSE & MEDICAL DECISION MAKING      Patient presents as above. States that he fell 3 to 4 days ago, had significant bruise to the lateral aspect of the hip and leg area. Is neurovascularly intact, compartment soft, generally tender throughout. X-ray imaging demonstrating no fracture, does have mild rhabdo which she was given fluids. He will be given crutches, advised elevation. He will be given NSAIDs, follow-up with orthopedics occupational health since he is filing [employer]. He is otherwise neurovascularly intact, low suspicion for underlying compartment syndrome. He will be discharged with outpatient follow-up. Vital signs and nursing notes reviewed during ED course. The patient and/or the family were informed of the results of any tests/labs/imaging, the treatment plan, and time was allotted to answer questions. Clinical  IMPRESSION    1. Fall, initial encounter    2. Leg hematoma, right, initial encounter      Comment: Please note this report has been produced using speech recognition software and may contain errors related to that system including errors in grammar, punctuation, and spelling, as well as words and phrases that may be inappropriate. If there are any questions or concerns please feel free to contact the dictating provider for clarification.       Anirudh Deleon 411, PA  08/03/21 2316

## 2021-08-24 NOTE — ED PROVIDER NOTES
I independently examined and evaluated Santhosh Cifuentes. In brief their history revealed abdominal pain, nausea, vomiting and diarrhea. Patient was admitted in serious health until 4 days ago when the above started. Patient initially had a cough and cold which is not slowly improving. Patient is having frequent diarrhea and now developed a little bit of bright red blood in his stool which brought him to the emergency Department. Abdominal pain also present last few days and is currently constant and sharp. Patient does not routinely get sick like this. Family member was sick with similar symptoms. Patient still has appendix. Their focused exam revealed the patient is afebrile, hemodynamically stable number. The patient appears age appropriate, appears well-hydrated, well-nourished. Appears very well and healthy sitting in bed comfortably. Mucous membranes are moist. Speech is clear. Breathing is unlabored. Abdomen with very mild diffuse tenderness to palpation. No rebound or guarding. No peritoneal signs. Negative Roche's. Negative McBurney's. Skin is dry. Mental status is normal. The patient moves all extremities and is without facial droop. ED course: Pt presents as above. Emergent conditions considered. Presentation prompted initial labs and imaging. Workup very reassuring. On reassessment patient is resting completely requesting discharge and reports feeling improved. Patient was stable vital signs and benign exam. Patient is appropriate for further outpatient follow-up with since that treatment for home going. Questions sought and answered with the patient. They voice understanding and agree with plan. All diagnostic, treatment, and disposition decisions were made by myself in conjunction with the Advanced Practice Provider. For all further details of the patient's emergency department visit, please see the Advanced Practice Provider's documentation.        Melvin Smith MD  05/13/19 Wound Care: Bacitracin

## 2021-10-19 ENCOUNTER — HOSPITAL ENCOUNTER (OUTPATIENT)
Dept: MRI IMAGING | Age: 36
Discharge: HOME | End: 2021-10-19
Payer: COMMERCIAL

## 2021-10-19 ENCOUNTER — HOSPITAL ENCOUNTER (OUTPATIENT)
Dept: ULTRASOUND IMAGING | Age: 36
Discharge: HOME | End: 2021-10-19
Payer: COMMERCIAL

## 2021-10-19 DIAGNOSIS — S46.811A TRAUMATIC TEAR OF SUPRASPINATUS TENDON OF RIGHT SHOULDER, INITIAL ENCOUNTER: ICD-10-CM

## 2021-10-19 DIAGNOSIS — S70.11XA CONTUSION OF RIGHT THIGH, INITIAL ENCOUNTER: ICD-10-CM

## 2021-10-19 PROCEDURE — 76882 US LMTD JT/FCL EVL NVASC XTR: CPT

## 2021-10-19 PROCEDURE — 73221 MRI JOINT UPR EXTREM W/O DYE: CPT

## 2021-10-20 ENCOUNTER — HOSPITAL ENCOUNTER (OUTPATIENT)
Dept: PHYSICAL THERAPY | Age: 36
Setting detail: THERAPIES SERIES
Discharge: HOME | End: 2021-10-20
Payer: COMMERCIAL

## 2021-10-20 PROCEDURE — 97140 MANUAL THERAPY 1/> REGIONS: CPT

## 2021-10-20 PROCEDURE — 97110 THERAPEUTIC EXERCISES: CPT

## 2021-10-20 PROCEDURE — 97162 PT EVAL MOD COMPLEX 30 MIN: CPT

## 2021-10-20 ASSESSMENT — PAIN SCALES - GENERAL: PAINLEVEL_OUTOF10: 4

## 2021-10-20 NOTE — PROGRESS NOTES
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 Also injured R thigh. History of low back pain with RLE symptoms  Referring Practitioner: Dr. Sonny Frankel  Referral Date : 10/21/21  Diagnosis: S46.811A  General Comment  Comments: L hand dominant  PT Visit Information  PT Insurance Information: [facility]  Subjective  Subjective: R shoulder pain, worst at superior aspect. most pain with abduction. Pain Screening  Patient Currently in Pain: Yes  Pain Assessment  Pain Level: 4  Vital Signs  Patient Currently in Pain: Yes    Vision/Hearing  Vision  Vision: Within Functional Limits  Hearing  Hearing: Within functional limits    Orientation  Orientation  Overall Orientation Status: Within Functional Limits    Social/Functional History  Social/Functional History  Lives With: Significant other  Active : Yes  Mode of Transportation: Car (car is broken)  Occupation: Full time employment  Type of occupation: [employer]  Leisure & Hobbies: 3 children    Objective     Observation/Palpation  Palpation: R biceps groove, AC joint, supra and infraspinatus. PROM RUE (degrees)  RUE General PROM: 45ER, 60IR, 145flexion. AROM RUE (degrees)  RUE General AROM: 145 flexion, 140abd  AROM LUE (degrees)  LUE General AROM: 160flexion, abd.    Strength RUE  Comment: flexion 4+, abd 4/5, ER 5/5, IR 4+/5 pain limited. Additional Measures  Special Tests: + speeds, + impingement signs, + AC pain with cross arm reach.            Assessment   Conditions Requiring Skilled Therapeutic Intervention  Assessment: Pt presents with complaints of R shoulder pain onset 20 when he fell on R elbow/forearm while running from a dog. He relates continued pain and difficulty lifting, reaching with his RUE. 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 He has pain lmited ROM at R shoulder, pain limited strength, tenderness to palpation at distal clavicle, AC joint, supra, infraspinatus, biceps groove. MRI revealed:No rotator cuff tear identified. Mild infraspinatus and subscapularistendinosis. Intact long head biceps tendon. Mild AC joint degenerative changes with trace subacromial subdeltoid bursalfluid. No rotator cuff tear identified. Treatment Diagnosis: Shoulder pain/strain  REQUIRES PT FOLLOW UP: Yes            Goals  Long term goals  Time Frame for Long term goals : 5 weeks  Long term goal 1: I in home program.  Long term goal 2: reach overhead with minimal pain. Long term goal 3: reach across body with minimal pain. Long term goal 4: lift/carry children with minimal pain. Patient Goals   Patient goals : decrease pain, return to work.                Gabriel Penaloza, PT

## 2021-10-20 NOTE — FLOWSHEET NOTE
Outpatient Physical Therapy  [place]           [x] Phone: [phone]   Fax: [phone]  [place]           [] Phone: [phone]   Fax: [phone]        Physical Therapy Daily Treatment Note  Date:  10/20/2021    Patient Name:  Amadeo Delgado    :  1985  MRN: [id number]  Restrictions/Precautions:    Diagnosis:   Diagnosis: R63.704V  Date of Injury/Surgery: 20  Treatment Diagnosis: Treatment Diagnosis: Shoulder pain/strain    Insurance/Certification information: PT Insurance Information: [facility]   Referring Physician:  Referring Practitioner: Dr. Braden Chinchilla  Next Doctor Visit:    Plan of care signed (Y/N):  n  Outcome Measure:   Visit# / total visits:   1/10  Pain level: 4/10at rest   Goals:     Patient goals : decrease pain, return to work. Long term goals  Time Frame for Long term goals : 5 weeks  Long term goal 1: I in home program.  Long term goal 2: reach overhead with minimal pain. Long term goal 3: reach across body with minimal pain. Long term goal 4: lift/carry children with minimal pain. Summary of Evaluation: Assessment: Pt presents with complaints of R shoulder pain onset 20 when he fell on R elbow/forearm while running from a dog. He relates continued pain and difficulty lifting, reaching with his RUE. 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 He has pain lmited ROM at R shoulder, pain limited strength, tenderness to palpation at distal clavicle, AC joint, supra, infraspinatus, biceps groove. MRI revealed:No rotator cuff tear identified. Mild infraspinatus and subscapularistendinosis. Intact long head biceps tendon. Mild AC joint degenerative changes with trace subacromial subdeltoid bursalfluid. No rotator cuff tear identified. Subjective:  See eval         Any changes in Ambulatory Summary Sheet?   None        Objective:  See eval   COVID screening questions were asked and patient attested that there had been no contact or symptoms        Exercises: (No more than 4 columns)   Exercise/Equipment Date 10/20/21 Date Date           WARM UP                     TABLE      AAROM w distraction and mobilization, flexion, scaption, ER     SL ER      Upper back stretch 20 sec x 2                    STANDING      Rows       flexion, scaption emphasis on eccentric initially      shld extension      Serratus wall climb                             PROPRIOCEPTION      Rhythmic stabilization                              MODALITIES      vaso                Other Therapeutic Activities/Education:        Home Exercise Program:    Access Code: [email or web address]  URL: [employer]  Date: 10/20/2021  Prepared by: Aditya Knowles    Exercises  Standing Lower Cervical and Upper Thoracic Stretch - 3 x daily - 7 x weekly - 1 sets - 3 reps - 20 hold  Seated Shoulder Inferior Glide - 3 x daily - 7 x weekly - 1 sets - 3 reps - 20 hold  Self trigger point release with tennis ball - 1 x daily - 7 x weekly - 1 sets - 20 hold      Manual Treatments:  GI-II mobilzation for relaxation, TPR to supra, infraspinatus, Rhomboids. Modalities:        Communication with other providers:        Assessment:  (Response towards treatment session) (Pain Rating)    Assessment: Pt presents with complaints of R shoulder pain onset 7/31/21 when he fell on R elbow/forearm while running from a dog. He relates continued pain and difficulty lifting, reaching with his RUE. 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 He has pain lmited ROM at R shoulder, pain limited strength, tenderness to palpation at distal clavicle, AC joint, supra, infraspinatus, biceps groove. MRI revealed:No rotator cuff tear identified. Mild infraspinatus and subscapularistendinosis. Intact long head biceps tendon. Mild AC joint degenerative changes with trace subacromial subdeltoid bursalfluid. No rotator cuff tear identified.      Pt noted shoulder felt looser and easier to move following treatment, pain 4/10 at rest.   Plan for Next Session:        Time In / Time Out:    1410/1510    If BWC Please Indicate Time In/Out/Total Time  CPT Code Time in Time out Total Time   22417 2056  3172  36   46117  0859  1510  15                                          Total for session      26           Timed Code/Total Treatment Minutes:  26/60      Next Progress Note due:        Plan of Care Interventions:  [x] Therapeutic Exercise  [] Modalities:  [x] Therapeutic Activity     [] Ultrasound  [] Estim  [] Gait Training      [] Cervical Traction [] Lumbar Traction  [x] Neuromuscular Re-education    [] Cold/hotpack [] Iontophoresis   [x] Instruction in HEP      [] Vasopneumatic   [] Dry Needling    [x] Manual Therapy               [] Aquatic Therapy              Electronically signed by:  Sandy Lam PT, 10/20/2021, 3:37 PM

## 2021-10-20 NOTE — PLAN OF CARE
running from a dog. He relates continued pain and difficulty lifting, reaching with his RUE. 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 He has pain lmited ROM at R shoulder, pain limited strength, tenderness to palpation at distal clavicle, AC joint, supra, infraspinatus, biceps groove. MRI revealed:No rotator cuff tear identified. Mild infraspinatus and subscapularistendinosis. Intact long head biceps tendon. Mild AC joint degenerative changes with trace subacromial subdeltoid bursalfluid. No rotator cuff tear identified. Plan of Care/Treatment to date:  [x] Therapeutic Exercise    [] Aquatics:  [x] Therapeutic Activity    [] Ultrasound  [x] Elec Stimulation  [] Gait Training     [] Cervical Traction [] Lumbar Traction  [x] Neuromuscular Re-education [] Cold/hotpack [] Iontophoresis   [x] Instruction in HEP       [x] Manual Therapy     [x] vasopneumatic            [] Self care home management        []Dry needling trigger point point/pain management          Frequency/Duration:  # Days per week: [] 1 day # Weeks: [] 1 week [x] 5 weeks     [x] 2 days   [] 2 weeks [] 6 weeks     [] 3 days   [] 3 weeks [] 7 weeks     [] 4 days   [] 4 weeks [] 8 weeks    Rehab Potential/Progress: [] Excellent [] Good [] Fair  [] Poor     Goals:       Long term goals  Time Frame for Long term goals : 5 weeks  Long term goal 1: I in home program.  Long term goal 2: reach overhead with minimal pain. Long term goal 3: reach across body with minimal pain. Long term goal 4: lift/carry children with minimal pain. Electronically signed by:  Tri Cox PT, PT, 10/20/2021, 3:35 PM              If you have any questions or concerns, please don't hesitate to call.   Thank you for your referral.      Physician Signature:_________________Date:____________Time: ________  By signing above, therapists plan is approved by physician

## 2021-10-22 ENCOUNTER — HOSPITAL ENCOUNTER (OUTPATIENT)
Dept: PHYSICAL THERAPY | Age: 36
Setting detail: THERAPIES SERIES
Discharge: HOME | End: 2021-10-22
Payer: COMMERCIAL

## 2021-10-22 PROCEDURE — 97110 THERAPEUTIC EXERCISES: CPT | Performed by: PHYSICAL THERAPY ASSISTANT

## 2021-10-22 PROCEDURE — 97016 VASOPNEUMATIC DEVICE THERAPY: CPT | Performed by: PHYSICAL THERAPY ASSISTANT

## 2021-10-25 ENCOUNTER — HOSPITAL ENCOUNTER (OUTPATIENT)
Dept: PHYSICAL THERAPY | Age: 36
Setting detail: THERAPIES SERIES
Discharge: HOME | End: 2021-10-25
Payer: COMMERCIAL

## 2021-10-25 PROCEDURE — 97110 THERAPEUTIC EXERCISES: CPT

## 2021-10-25 PROCEDURE — 97016 VASOPNEUMATIC DEVICE THERAPY: CPT

## 2021-10-25 NOTE — FLOWSHEET NOTE
Outpatient Physical Therapy  [place]           [x] Phone: [phone]   Fax: [phone]  [facility]           [] Phone: [phone]   Fax: [phone]        Physical Therapy Daily Treatment Note  Date:  10/25/2021    Patient Name:  Charlotte Talley    :  1985  MRN: [id number]  Restrictions/Precautions:    Diagnosis:   Diagnosis: V98.717H  Date of Injury/Surgery: 21  Treatment Diagnosis: Treatment Diagnosis: Shoulder pain/strain    Insurance/Certification information: PT Insurance Information: [facility] (10 visits by 21)  Referring Physician:  Referring Practitioner: Dr. Araceli Arroyo  Next Doctor Visit:   10/27/21  Plan of care signed (Y/N):  n  Outcome Measure:   Visit# / total visits:   3/10  Pain level: 7/10 at rest   Goals:     Patient goals : decrease pain, return to work. Long term goals  Time Frame for Long term goals : 5 weeks  Long term goal 1: I in home program.  Long term goal 2: reach overhead with minimal pain. Long term goal 3: reach across body with minimal pain. Long term goal 4: lift/carry children with minimal pain. Summary of Evaluation: Assessment: Pt presents with complaints of R shoulder pain onset 21 when he fell on R elbow/forearm while running from a dog. He relates continued pain and difficulty lifting, reaching with his RUE. 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 He has pain lmited ROM at R shoulder, pain limited strength, tenderness to palpation at distal clavicle, AC joint, supra, infraspinatus, biceps groove. MRI revealed:No rotator cuff tear identified. Mild infraspinatus and subscapularistendinosis. Intact long head biceps tendon. Mild AC joint degenerative changes with trace subacromial subdeltoid bursalfluid. No rotator cuff tear identified. Subjective:   Michaela Dorsey arrives with 7/10 pain in his right shoulder. Today is more sore than the last time he was here. Any changes in Ambulatory Summary Sheet? None        Objective:    COVID screening questions were asked and patient attested that there had been no contact or symptoms    Painful arc :   70 to 90 degrees of right shoulder motion scaption, flexion and abduction. Exercises: (No more than 4 columns)   Exercise/Equipment Date 10/20/21 Date  10/22/21 #2 10/25/21 #3 Date            WARM UP            UBE 1'fwd/1'bwd 2'/2' UBE            TABLE       AAROM w distraction and mobilization, flexion, scaption, ER --     SL ER  SL not resisted 2x10 SL not resisted 2x10 and chicken wing 2x10    UT and Levator stretch   2x30\" ea    Upper back stretch 20 sec x 2 - Lat stretch at TM x30\"    Seated ER pull aparts   RTB 2x10              STANDING       Rows  RTB 2 x 10 RTB 2 x 10     flexion, scaption emphasis on eccentric initially  flxn YTB 2x10    scaption-at wall ecc.lowers   1# 2 x10            shld extension  Cane 2 x10 Hand clasped behind back 2x10    RTB 2x10    Serratus wall climb  RTB from chest height to tolerated x7 reps YTB 2x10                              PROPRIOCEPTION       Rhythmic stabilization  Supine R, ABC box letters   x1 set Standing R holding ball against wall 2x30\", ABC box letters   x1 set                                MODALITIES       vaso  10' 10'               Other Therapeutic Activities/Education:        Home Exercise Program:    Access Code: [email or web address]  URL: [email or web address]  Date: 10/20/2021  Prepared by: Ligia Fung    Exercises  Standing Lower Cervical and Upper Thoracic Stretch - 3 x daily - 7 x weekly - 1 sets - 3 reps - 20 hold  Seated Shoulder Inferior Glide - 3 x daily - 7 x weekly - 1 sets - 3 reps - 20 hold  Self trigger point release with tennis ball - 1 x daily - 7 x weekly - 1 sets - 20 hold      Manual Treatments:  STM/TPR to RUT and Levator attachment    Modalities:  VASO x 10   Min. , mod pressure at 34* cold sitting at right shoulder.         Communication with other providers:  None today      Assessment: (Response towards treatment session) (Pain Rating)  Clicking and popping noted with reps of right shoulder flexion,scaption and abduction. RUT and Rt levator attachment demo's mod tension; STM/TPR to these areas which where TTP and MT helped slightly decrease pain here. He tolerates all activities well with min pain, and is instructed to move within his pain free ROM. Pt noted shoulder pain 4-5/10 after tx today. Plan for Next Session:  Ongoing strengthening and stability right shoulder complex per POC. Time In / Time Out:    [id number]. (10') VASO , and (40') TE      If [facility] Please Indicate Time In/Out  CPT Time IN Time Out Total time   TE 6753 3679 40'   VASO 1158 1208 10'               Timed Code/Total Treatment Minutes:  40'/50' (see above for billed)      Next Progress Note due:        Plan of Care Interventions:  [x] Therapeutic Exercise  [] Modalities:  [x] Therapeutic Activity     [] Ultrasound  [] Estim  [] Gait Training      [] Cervical Traction [] Lumbar Traction  [x] Neuromuscular Re-education    [] Cold/hotpack [] Iontophoresis   [x] Instruction in HEP      [] Vasopneumatic   [] Dry Needling    [x] Manual Therapy               [] Aquatic Therapy              Electronically signed by:   Sherley Alvarez PTA 10/25/2021, 7:56 AM

## 2021-10-28 ENCOUNTER — HOSPITAL ENCOUNTER (OUTPATIENT)
Dept: PHYSICAL THERAPY | Age: 36
Setting detail: THERAPIES SERIES
Discharge: HOME | End: 2021-10-28
Payer: COMMERCIAL

## 2021-10-28 PROCEDURE — 97110 THERAPEUTIC EXERCISES: CPT

## 2021-10-28 PROCEDURE — 97140 MANUAL THERAPY 1/> REGIONS: CPT

## 2021-10-28 NOTE — FLOWSHEET NOTE
Outpatient Physical Therapy  [place]           [x] Phone: [phone]   Fax: [phone]  [place]           [] Phone: [phone]   Fax: [phone]        Physical Therapy Daily Treatment Note  Date:  10/28/2021    Patient Name:  Francois King    :  1985  MRN: [id number]  Restrictions/Precautions:    Diagnosis:   Diagnosis: M22.156X  Date of Injury/Surgery: 21  Treatment Diagnosis: Treatment Diagnosis: Shoulder pain/strain    Insurance/Certification information: PT Insurance Information: [id number] (10 visits by 21)  Referring Physician:  Referring Practitioner: Dr. Matti Diaz  Next Doctor Visit:   10/27/21  Plan of care signed (Y/N):  n  Outcome Measure:   Visit# / total visits:   4/10  Pain level: 3/10 at rest   Goals:     Patient goals : decrease pain, return to work. Long term goals  Time Frame for Long term goals : 5 weeks  Long term goal 1: I in home program.  Long term goal 2: reach overhead with minimal pain. MET  Long term goal 3: reach across body with minimal pain. Met  Long term goal 4: lift/carry children with minimal pain. Summary of Evaluation: Assessment: Pt presents with complaints of R shoulder pain onset 21 when he fell on R elbow/forearm while running from a dog. He relates continued pain and difficulty lifting, reaching with his RUE. 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 He has pain lmited ROM at R shoulder, pain limited strength, tenderness to palpation at distal clavicle, AC joint, supra, infraspinatus, biceps groove. MRI revealed:No rotator cuff tear identified. Mild infraspinatus and subscapularistendinosis. Intact long head biceps tendon. Mild AC joint degenerative changes with trace subacromial subdeltoid bursalfluid. No rotator cuff tear identified. Subjective:     Shoulder is moving quite a bit better after last session. Much less pain with active motion. Still has pain with lifting.       Any changes in Ambulatory Summary Sheet? None        Objective:    COVID screening questions were asked and patient attested that there had been no contact or symptoms          Exercises: (No more than 4 columns)   Exercise/Equipment Date 10/20/21 Date  10/22/21 #2 10/25/21 #3 Date 10/28/21 #4            WARM UP            UBE 1'fwd/1'bwd 2'/2' UBE            TABLE       AAROM w distraction and mobilization, flexion, scaption, ER --     SL ER  SL not resisted 2x10 SL not resisted 2x10 and chicken wing 2x10    UT and Levator stretch   2x30\" ea 2x30\" ea   Upper back stretch 20 sec x 2 - Lat stretch at TM x30\"    Seated ER pull aparts   RTB 2x10 Green 2x10             STANDING       Rows  RTB 2 x 10 RTB 2 x 10 CC40# 2x10    flexion, scaption emphasis on eccentric initially  flxn YTB 2x10    scaption-at wall ecc.lowers   1# 2 x10  2# flexion, scaption 2x10          shld extension  Cane 2 x10 Hand clasped behind back 2x10    RTB 2x10 FM:  7# 2x10 pronated  3# supinatedd 2x10   Serratus wall climb  RTB from chest height to tolerated x7 reps YTB 2x10    Wall push up    3x10                      PROPRIOCEPTION       Rhythmic stabilization  Supine R, ABC box letters   x1 set Standing R holding ball against wall 2x30\", ABC box letters   x1 set    Wall clock    Green band 1-5 oclock 3 x 5                         MODALITIES       vaso  10' 10' declined              Other Therapeutic Activities/Education:        Home Exercise Program:    Access Code: [email or web address]  URL: [email or web address]  Date: 10/20/2021  Prepared by: Leo Rapp    Exercises  Standing Lower Cervical and Upper Thoracic Stretch - 3 x daily - 7 x weekly - 1 sets - 3 reps - 20 hold  Seated Shoulder Inferior Glide - 3 x daily - 7 x weekly - 1 sets - 3 reps - 20 hold  Self trigger point release with tennis ball - 1 x daily - 7 x weekly - 1 sets - 20 hold      Manual Treatments:   TPR to upper trap, levator, supraspinatus, pectorals. Clavicular mobilization.    Modalities:  VASO x 10   Min. , mod pressure at 34* cold sitting at right shoulder. Communication with other providers:  None today      Assessment:  (Response towards treatment session) (Pain Rating)  Pt improving in pain levels and functions. Increased activities today without increased pain. Pt would benefit from additional therapy to improve symptoms and functions to return to prior level and work. Pt noted shoulder pain 4/10 after tx today. Plan for Next Session:  Ongoing strengthening and stability right shoulder complex per POC.       Time In / Time Out:          If B[facility] Please Indicate Time In/Out  CPT Time IN Time Out Total time   TE 0940 1009 29   VASO      Manual [id number] 0925 0940 15         Timed Code/Total Treatment Minutes:  44/44      Next Progress Note due:        Plan of Care Interventions:  [x] Therapeutic Exercise  [] Modalities:  [x] Therapeutic Activity     [] Ultrasound  [] Estim  [] Gait Training      [] Cervical Traction [] Lumbar Traction  [x] Neuromuscular Re-education    [] Cold/hotpack [] Iontophoresis   [x] Instruction in HEP      [] Vasopneumatic   [] Dry Needling    [x] Manual Therapy               [] Aquatic Therapy              Electronically signed by:  Evone Sandifer, PT 10/28/2021, 9:22 AM

## 2021-11-01 ENCOUNTER — HOSPITAL ENCOUNTER (OUTPATIENT)
Dept: PHYSICAL THERAPY | Age: 36
Setting detail: THERAPIES SERIES
Discharge: HOME | End: 2021-11-01
Payer: COMMERCIAL

## 2021-11-01 PROCEDURE — 97110 THERAPEUTIC EXERCISES: CPT

## 2021-11-01 PROCEDURE — 97140 MANUAL THERAPY 1/> REGIONS: CPT

## 2021-11-01 NOTE — FLOWSHEET NOTE
Outpatient Physical Therapy  [place]           [x] Phone: [phone]   Fax: [phone]  [facility]           [] Phone: [phone]   Fax: [phone]        Physical Therapy Daily Treatment Note  Date:  2021    Patient Name:  Juan Manuel Salvador    :  1985  MRN: [id number]  Restrictions/Precautions:    Diagnosis:   Diagnosis: X12.915I  Date of Injury/Surgery: 21  Treatment Diagnosis: Treatment Diagnosis: Shoulder pain/strain    Insurance/Certification information: PT Insurance Information: [id number] (10 visits by 21)  Referring Physician:  Referring Practitioner: Dr. Enrique Aguayo  Next Doctor Visit:   10/27/21  Plan of care signed (Y/N):  n  Outcome Measure:   Visit# / total visits:   5/10  Pain level: -/10 at rest   Goals:     Patient goals : decrease pain, return to work. Long term goals  Time Frame for Long term goals : 5 weeks  Long term goal 1: I in home program. Progressing  Long term goal 2: reach overhead with minimal pain. MET  Long term goal 3: reach across body with minimal pain. Met  Long term goal 4: lift/carry children with minimal pain. Summary of Evaluation: Assessment: Pt presents with complaints of R shoulder pain onset 21 when he fell on R elbow/forearm while running from a dog. He relates continued pain and difficulty lifting, reaching with his RUE. 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 He has pain lmited ROM at R shoulder, pain limited strength, tenderness to palpation at distal clavicle, AC joint, supra, infraspinatus, biceps groove. MRI revealed:No rotator cuff tear identified. Mild infraspinatus and subscapularistendinosis. Intact long head biceps tendon. Mild AC joint degenerative changes with trace subacromial subdeltoid bursalfluid. No rotator cuff tear identified. Subjective:     Yolande Soulier arrives with no pain today. He speculates whether he needs to continue coming to PT.  Added more to his HEP incase he decides he does needs additional therapy. His rational is that he needs to find another job, since he was recently let go by the job he was injured at, and he needs all his extra time to focus on becoming employed. Any changes in Ambulatory Summary Sheet? None        Objective:    COVID screening questions were asked and patient attested that there had been no contact or symptoms    AROM:  RUE flexion :180  RUE abduction: 180: min pain 1/10 at levator attachment   RUE ER: 92  RUE IR: 62        Exercises: (No more than 4 columns)   Exercise/Equipment Date  10/22/21 #2 10/25/21 #3 Date 10/28/21 #4 11/1/21 #5            WARM UP           UBE 1'fwd/1'bwd 2'/2' UBE   3'/3' UBE          TABLE       AAROM --      SL ER SL not resisted 2x10 SL not resisted 2x10 and chicken wing 2x10     UT and Levator stretch  2x30\" ea 2x30\" ea 2x30\" ea   Upper back stretch - Lat stretch at TM x30\"     Seated ER pull aparts  RTB 2x10 Green 2x10              STANDING       *Rows RTB 2 x 10 RTB 2 x 10 CC40# 2x10 GTB 2x10    flexion, scaption emphasis on eccentric initially flxn YTB 2x10    scaption-at wall ecc.lowers   1# 2 x10  2# flexion, scaption 2x10    IR/ER    GTB 2x10   Shld flexion    GTB x10   shld extension Cane 2 x10 Hand clasped behind back 2x10    RTB 2x10 FM:  7# 2x10 pronated  3# supinatedd 2x10 GTB 2x10   Serratus wall climb RTB from chest height to tolerated x7 reps YTB 2x10  GTB 2x10   Wall push up   3x10 2x10                      PROPRIOCEPTION       Rhythmic stabilization Supine R, ABC box letters   x1 set Standing R holding ball against wall 2x30\", ABC box letters   x1 set     Wall clock   Green band 1-5 oclock 3 x 5                          MODALITIES       vaso 10' 10' declined               Other Therapeutic Activities/Education:        Home Exercise Program:    Access Code: [email or web address]  URL: [email or web address]  Date: 10/20/2021  Prepared by: Murray Carrillo    Exercises  Standing Lower Cervical and Upper Thoracic Stretch - 3 x daily - 7 x weekly - 1 sets - 3 reps - 20 hold  Seated Shoulder Inferior Glide - 3 x daily - 7 x weekly - 1 sets - 3 reps - 20 hold  Self trigger point release with tennis ball - 1 x daily - 7 x weekly - 1 sets - 20 hold    Added 11/1/21  Standing Rows, IR/ER, shoulder flexion/extension, serratus wall climb and push ups      Manual Treatments:   TPR to upper trap, levator, supraspinatus, pectorals. Clavicular mobilization. Modalities:  VASO x 10   Min. , mod pressure at 34* cold sitting at right shoulder. Communication with other providers:  None today      Assessment:  (Response towards treatment session) (Pain Rating)  Pt improving in pain levels and functions. Remeasured AROM today. Min tension today in levator and UT. Exercises today are sent home with green and blue theraband to be his HEP. ROM measured at the EOS today, he demo's improved ROM compared to IE. He still demo's decrease strength with today's activities. He would continue to benefit from PT to prevent future pain with functional activities. Patient is instructed to cancel this week's appt to trial HEP and to return if his pain where to increase/return. Pt noted shoulder pain 0/10 after tx today. Plan for Next Session:  Ongoing strengthening and stability right shoulder complex per POC.       Time In / Time Out:  1115/1150      If [facility] Please Indicate Time In/Out  CPT Time IN Time Out Total time   TE 1125 1155 30   VASO      Manual 23010 1115 1125 10         Timed Code/Total Treatment Minutes:  40' 1 MT, 2 TE      Next Progress Note due:        Plan of Care Interventions:  [x] Therapeutic Exercise  [] Modalities:  [x] Therapeutic Activity     [] Ultrasound  [] Estim  [] Gait Training      [] Cervical Traction [] Lumbar Traction  [x] Neuromuscular Re-education    [] Cold/hotpack [] Iontophoresis   [x] Instruction in HEP      [] Vasopneumatic   [] Dry Needling    [x] Manual Therapy               [] Aquatic Therapy

## 2021-11-02 NOTE — FLOWSHEET NOTE
Patients Plan of Care was received and signed. Signed POC was scanned and placed in the patients chart.     Karly Gil

## 2021-11-02 NOTE — FLOWSHEET NOTE
He said he is going to continue therapy at home. He feels like he has reached his max with therapy. He cxd last remaining appts.

## 2021-11-09 NOTE — PROGRESS NOTES
Babs, PT, PT, 11/9/2021, 8:17 AM    If you have any questions or concerns, please don't hesitate to call.   Thank you for your referral.

## 2021-12-13 ENCOUNTER — HOSPITAL ENCOUNTER (OUTPATIENT)
Age: 36
Discharge: HOME | End: 2021-12-13
Payer: MEDICARE

## 2021-12-13 LAB
ALBUMIN SERPL-MCNC: 4.7 GM/DL (ref 3.4–5)
ALP BLD-CCNC: 101 IU/L (ref 40–128)
ALT SERPL-CCNC: 38 U/L (ref 10–40)
ANION GAP SERPL CALCULATED.3IONS-SCNC: 10 MMOL/L (ref 4–16)
AST SERPL-CCNC: 20 IU/L (ref 15–37)
BASOPHILS ABSOLUTE: 0.1 K/CU MM
BASOPHILS RELATIVE PERCENT: 0.9 % (ref 0–1)
BILIRUB SERPL-MCNC: 0.7 MG/DL (ref 0–1)
BUN BLDV-MCNC: 11 MG/DL (ref 6–23)
CALCIUM SERPL-MCNC: 9.3 MG/DL (ref 8.3–10.6)
CHLORIDE BLD-SCNC: 102 MMOL/L (ref 99–110)
CHOLESTEROL: 187 MG/DL
CO2: 26 MMOL/L (ref 21–32)
CREAT SERPL-MCNC: 1 MG/DL (ref 0.9–1.3)
DIFFERENTIAL TYPE: ABNORMAL
EOSINOPHILS ABSOLUTE: 0.1 K/CU MM
EOSINOPHILS RELATIVE PERCENT: 1.9 % (ref 0–3)
GFR AFRICAN AMERICAN: >60 ML/MIN/1.73M2
GFR NON-AFRICAN AMERICAN: >60 ML/MIN/1.73M2
GLUCOSE BLD-MCNC: 98 MG/DL (ref 70–99)
HCT VFR BLD CALC: 50 % (ref 42–52)
HDLC SERPL-MCNC: 31 MG/DL
HEMOGLOBIN: 17 GM/DL (ref 13.5–18)
IMMATURE NEUTROPHIL %: 0.2 % (ref 0–0.43)
LDL CHOLESTEROL DIRECT: 100 MG/DL
LYMPHOCYTES ABSOLUTE: 1.9 K/CU MM
LYMPHOCYTES RELATIVE PERCENT: 33.9 % (ref 24–44)
MCH RBC QN AUTO: 30.9 PG (ref 27–31)
MCHC RBC AUTO-ENTMCNC: 34 % (ref 32–36)
MCV RBC AUTO: 90.9 FL (ref 78–100)
MONOCYTES ABSOLUTE: 0.5 K/CU MM
MONOCYTES RELATIVE PERCENT: 7.9 % (ref 0–4)
NUCLEATED RBC %: 0 %
PDW BLD-RTO: 11.9 % (ref 11.7–14.9)
PLATELET # BLD: 362 K/CU MM (ref 140–440)
PMV BLD AUTO: 9.7 FL (ref 7.5–11.1)
POTASSIUM SERPL-SCNC: 4.4 MMOL/L (ref 3.5–5.1)
RBC # BLD: 5.5 M/CU MM (ref 4.6–6.2)
SEGMENTED NEUTROPHILS ABSOLUTE COUNT: 3.2 K/CU MM
SEGMENTED NEUTROPHILS RELATIVE PERCENT: 55.2 % (ref 36–66)
SODIUM BLD-SCNC: 138 MMOL/L (ref 135–145)
TOTAL IMMATURE NEUTOROPHIL: 0.01 K/CU MM
TOTAL NUCLEATED RBC: 0 K/CU MM
TOTAL PROTEIN: 7.1 GM/DL (ref 6.4–8.2)
TRIGL SERPL-MCNC: 386 MG/DL
WBC # BLD: 5.7 K/CU MM (ref 4–10.5)

## 2021-12-13 PROCEDURE — 83721 ASSAY OF BLOOD LIPOPROTEIN: CPT

## 2021-12-13 PROCEDURE — 85025 COMPLETE CBC W/AUTO DIFF WBC: CPT

## 2021-12-13 PROCEDURE — 36415 COLL VENOUS BLD VENIPUNCTURE: CPT

## 2021-12-13 PROCEDURE — 80053 COMPREHEN METABOLIC PANEL: CPT

## 2021-12-13 PROCEDURE — 80061 LIPID PANEL: CPT

## 2022-03-09 ENCOUNTER — OFFICE VISIT (OUTPATIENT)
Dept: INTERNAL MEDICINE CLINIC | Age: 37
End: 2022-03-09
Payer: MEDICARE

## 2022-03-09 VITALS
WEIGHT: 226 LBS | BODY MASS INDEX: 29 KG/M2 | SYSTOLIC BLOOD PRESSURE: 138 MMHG | HEART RATE: 89 BPM | DIASTOLIC BLOOD PRESSURE: 90 MMHG | OXYGEN SATURATION: 98 % | TEMPERATURE: 97.6 F | HEIGHT: 74 IN

## 2022-03-09 DIAGNOSIS — E78.1 HYPERTRIGLYCERIDEMIA: ICD-10-CM

## 2022-03-09 DIAGNOSIS — Z00.00 ANNUAL PHYSICAL EXAM: Primary | ICD-10-CM

## 2022-03-09 DIAGNOSIS — F31.81 BIPOLAR 2 DISORDER (HCC): ICD-10-CM

## 2022-03-09 DIAGNOSIS — M51.36 DDD (DEGENERATIVE DISC DISEASE), LUMBAR: ICD-10-CM

## 2022-03-09 DIAGNOSIS — F41.9 ANXIETY: ICD-10-CM

## 2022-03-09 DIAGNOSIS — S70.11XS CONTUSION OF RIGHT THIGH, SEQUELA: ICD-10-CM

## 2022-03-09 PROCEDURE — 99395 PREV VISIT EST AGE 18-39: CPT | Performed by: FAMILY MEDICINE

## 2022-03-09 PROCEDURE — G8484 FLU IMMUNIZE NO ADMIN: HCPCS | Performed by: FAMILY MEDICINE

## 2022-03-09 SDOH — ECONOMIC STABILITY: FOOD INSECURITY: WITHIN THE PAST 12 MONTHS, YOU WORRIED THAT YOUR FOOD WOULD RUN OUT BEFORE YOU GOT MONEY TO BUY MORE.: NEVER TRUE

## 2022-03-09 SDOH — ECONOMIC STABILITY: FOOD INSECURITY: WITHIN THE PAST 12 MONTHS, THE FOOD YOU BOUGHT JUST DIDN'T LAST AND YOU DIDN'T HAVE MONEY TO GET MORE.: NEVER TRUE

## 2022-03-09 ASSESSMENT — ENCOUNTER SYMPTOMS
DIARRHEA: 0
BLOOD IN STOOL: 0
BACK PAIN: 1
EYES NEGATIVE: 1
SHORTNESS OF BREATH: 0
ABDOMINAL PAIN: 0
CONSTIPATION: 0
NAUSEA: 0
COUGH: 0

## 2022-03-09 ASSESSMENT — PATIENT HEALTH QUESTIONNAIRE - PHQ9
3. TROUBLE FALLING OR STAYING ASLEEP: 0
1. LITTLE INTEREST OR PLEASURE IN DOING THINGS: 0
7. TROUBLE CONCENTRATING ON THINGS, SUCH AS READING THE NEWSPAPER OR WATCHING TELEVISION: 0
10. IF YOU CHECKED OFF ANY PROBLEMS, HOW DIFFICULT HAVE THESE PROBLEMS MADE IT FOR YOU TO DO YOUR WORK, TAKE CARE OF THINGS AT HOME, OR GET ALONG WITH OTHER PEOPLE: 0
4. FEELING TIRED OR HAVING LITTLE ENERGY: 0
SUM OF ALL RESPONSES TO PHQ9 QUESTIONS 1 & 2: 0
SUM OF ALL RESPONSES TO PHQ QUESTIONS 1-9: 0
5. POOR APPETITE OR OVEREATING: 0
8. MOVING OR SPEAKING SO SLOWLY THAT OTHER PEOPLE COULD HAVE NOTICED. OR THE OPPOSITE, BEING SO FIGETY OR RESTLESS THAT YOU HAVE BEEN MOVING AROUND A LOT MORE THAN USUAL: 0
SUM OF ALL RESPONSES TO PHQ QUESTIONS 1-9: 0
9. THOUGHTS THAT YOU WOULD BE BETTER OFF DEAD, OR OF HURTING YOURSELF: 0
6. FEELING BAD ABOUT YOURSELF - OR THAT YOU ARE A FAILURE OR HAVE LET YOURSELF OR YOUR FAMILY DOWN: 0
2. FEELING DOWN, DEPRESSED OR HOPELESS: 0
SUM OF ALL RESPONSES TO PHQ QUESTIONS 1-9: 0
SUM OF ALL RESPONSES TO PHQ QUESTIONS 1-9: 0

## 2022-03-09 ASSESSMENT — SOCIAL DETERMINANTS OF HEALTH (SDOH): HOW HARD IS IT FOR YOU TO PAY FOR THE VERY BASICS LIKE FOOD, HOUSING, MEDICAL CARE, AND HEATING?: NOT HARD AT ALL

## 2022-03-09 NOTE — PROGRESS NOTES
Medication Sig Taking? Authorizing Provider   ibuprofen (IBU) 600 MG tablet Take 1 tablet by mouth every 6 hours as needed for Pain Yes CAIO Mcnulty   traZODone (DESYREL) 50 MG tablet  Yes Historical Provider, MD   PARoxetine HCl (PAXIL PO) Take by mouth Yes Historical Provider, MD   methylphenidate (RITALIN) 20 MG tablet Take one and one-half a day Yes Historical Provider, MD   ARIPiprazole (ABILIFY PO) Take by mouth Yes Historical Provider, MD   gemfibrozil (LOPID) 600 MG tablet Take 1 tablet by mouth 2 times daily  Angely Cisneros DO   gabapentin (NEURONTIN) 300 MG capsule Take 1 capsule by mouth 2 times daily for 30 days. Patient not taking: Reported on 3/9/2022  Angely Cisneros DO         Past Medical History:   Diagnosis Date    ADHD     Anxiety     Bipolar 2 disorder ([facility] [address]     Chronic low back pain     Depression     Seizures (HCC)        Past Surgical History:   Procedure Laterality Date    EYE SURGERY      for amblyopia    TONSILLECTOMY AND ADENOIDECTOMY      TYMPANOSTOMY TUBE PLACEMENT           Family History   Problem Relation Age of Onset    Ovarian Cancer Mother     Bipolar Disorder Mother     Lung Cancer Father     Other Brother         cerebral palsy    Diabetes Maternal Grandmother     Diabetes Paternal Grandmother        Social History     Tobacco Use    Smoking status: Former Smoker    Smokeless tobacco: Never Used   Substance Use Topics    Alcohol use: Yes     Comment: occasional    Drug use: Not Currently     Types: Marijuana (Weed)       Objective   BP (!) 138/90 (Site: Right Upper Arm, Position: Sitting, Cuff Size: Medium Adult)   Pulse 89   Temp 97.6 °F (36.4 °C)   Ht 6' 2\" (1.88 m)   Wt 226 lb (102.5 kg)   SpO2 98%   BMI 29.02 kg/m²   Wt Readings from Last 3 Encounters:   03/09/22 226 lb (102.5 kg)   08/03/21 230 lb (104.3 kg)   05/22/21 234 lb (106.1 kg)     There were no vitals filed for this visit. Physical Exam  Vitals reviewed.    Constitutional: General: He is not in acute distress. HENT:      Right Ear: Tympanic membrane normal.      Left Ear: Tympanic membrane normal.   Eyes:      Extraocular Movements: Extraocular movements intact. Conjunctiva/sclera: Conjunctivae normal.      Pupils: Pupils are equal, round, and reactive to light. Cardiovascular:      Rate and Rhythm: Normal rate and regular rhythm. Pulmonary:      Effort: Pulmonary effort is normal. No respiratory distress. Breath sounds: Normal breath sounds. Abdominal:      General: Bowel sounds are normal.      Palpations: Abdomen is soft. Tenderness: There is no abdominal tenderness. Musculoskeletal:         General: Tenderness (discomfort R lateral thigh. back- chronic) present. Cervical back: Neck supple. Right lower leg: No edema. Left lower leg: No edema. Skin:     Findings: No rash. Neurological:      General: No focal deficit present. Mental Status: He is alert and oriented to person, place, and time. Psychiatric:         Mood and Affect: Mood normal.           Assessment   Plan   1. Annual physical exam  2. Hypertriglyceridemia  -     Lipid Panel; Future  The patient is asked to make an attempt to improve diet and exercise patterns   3. Contusion of right thigh, sequela  -     US EXTREMITY RIGHT NON VASCULAR LIMITED; Future  4. DDD (degenerative disc disease), lumbar  Will schedule with new pain management on his insurance  5. Bipolar 2 disorder ([facility] [address]  6. Anxiety  Keep f/u with psychiatry     Personalized Preventive Plan   Current Health Maintenance Status    There is no immunization history on file for this patient.      Health Maintenance   Topic Date Due    Hepatitis C screen  Never done    Varicella vaccine (1 of 2 - 2-dose childhood series) Never done    COVID-19 Vaccine (1) Never done    HIV screen  Never done    DTaP/Tdap/Td vaccine (1 - Tdap) Never done    Flu vaccine (1) Never done    Depression Monitoring  03/09/2023    Hepatitis A vaccine  Aged Out    Hepatitis B vaccine  Aged Out    Hib vaccine  Aged Out    Meningococcal (ACWY) vaccine  Aged Out    Pneumococcal 0-64 years Vaccine  Aged Out     Recommendations for Always Prepped Due: see orders and patient instructions/AVS.    Return if symptoms worsen or fail to improve.

## 2022-03-09 NOTE — PATIENT INSTRUCTIONS
Patient Education        Learning About High Triglycerides  What are high triglycerides? Triglycerides are a type of fat in your blood. Your body uses them for energy. You need some for good health. But high triglyceride levels are linked with a higher risk of coronary artery disease. A high level may be a sign of metabolic syndrome. Very high levels raise your risk of pancreatitis. What causes them? High triglycerides can run in families. They may also be caused by other conditions, like obesity and diabetes. You may have high levels of this fat if you eat or drink too many foods or drinks with added sugar or if you drink a lot of alcohol. And some medicines can cause this condition. What are their symptoms? High triglycerides usually don't cause symptoms. But if the condition is genetic, you may see fatty bumps under your skin. How are they diagnosed? A blood test is used to measure triglycerides. It's most accurate if it's done after you go without food or drink for 9 to 14 hours (fasting). Triglyceride levels are:  · Normal when they are less than 150 mg/dL. · Moderately high when they are 150 to 499 mg/dL. · Very high when they are 500 mg/dL or higher. How are they treated? A healthy lifestyle can help lower your triglycerides and your risk of coronary artery disease. It includes losing weight, being active, limiting high-sugar foods and drinks, and limiting alcohol. Your doctor may recommend that you also take medicine. Your doctor will treat other health problems if they are causing high levels. How do you care for yourself when you have high triglycerides? A healthy diet and lifestyle can help lower your triglycerides level and lower your risk of coronary artery disease. · Lose weight, and stay at a healthy weight. Triglycerides are stored as fat in your tissues and muscles. · Limit foods and drinks that have a lot of sugar.    These include sugar-sweetened desserts, soda pop, and fruit juice.  · Limit saturated fats. These are found in animal-based foods like meat, butter, milk, and cheese. They are also found in coconut oil, palm oil, and cocoa butter. · Choose a heart-healthy eating plan. Eat a diet that's rich in vegetables, whole grains, fish, lean meats, and low-fat or nonfat dairy foods. Eating oily fish may lower your levels. These fish include salmon, mackerel, lake trout, herring, and sardines. · Limit or avoid alcohol. Limit alcohol to 2 drinks a day for men and 1 drink a day for women. Alcohol has a strong effect on triglycerides. · Be active on most days of the week. Before you start to be more active, check with your doctor to be sure it's safe. Try to do moderate activity at least 2½ hours a week. Or try to do vigorous activity at least 1¼ hours a week. · If you have diabetes, keep your blood sugar in your target range. · Don't smoke. If you need help quitting, talk to your doctor about stop-smoking programs and medicines. These can increase your chances of quitting for good. · Take your medicines exactly as prescribed. Call your doctor if you think you are having a problem with your medicine. Where can you learn more? Go to https://MiniLuxe.Tiinkk. org and sign in to your [employer] account. Enter T123 in the Bombfell box to learn more about \"Learning About High Triglycerides. \"     If you do not have an account, please click on the \"Sign Up Now\" link. Current as of: April 29, 2021               Content Version: 13.1  © 7835-2265 Healthwise, Incorporated. Care instructions adapted under license by [facility] ([facility]). If you have questions about a medical condition or this instruction, always ask your healthcare professional. [place] [address] any warranty or liability for your use of this information.

## 2022-03-11 ENCOUNTER — TELEPHONE (OUTPATIENT)
Dept: INTERNAL MEDICINE CLINIC | Age: 37
End: 2022-03-11

## 2022-03-11 ENCOUNTER — HOSPITAL ENCOUNTER (OUTPATIENT)
Dept: ULTRASOUND IMAGING | Age: 37
Discharge: HOME | End: 2022-03-11
Payer: MEDICARE

## 2022-03-11 ENCOUNTER — HOSPITAL ENCOUNTER (OUTPATIENT)
Age: 37
Discharge: HOME | End: 2022-03-11
Payer: MEDICARE

## 2022-03-11 DIAGNOSIS — E78.1 HYPERTRIGLYCERIDEMIA: ICD-10-CM

## 2022-03-11 DIAGNOSIS — E78.1 HYPERTRIGLYCERIDEMIA: Primary | ICD-10-CM

## 2022-03-11 DIAGNOSIS — S70.11XS CONTUSION OF RIGHT THIGH, SEQUELA: ICD-10-CM

## 2022-03-11 LAB
CHOLESTEROL: 232 MG/DL
HDLC SERPL-MCNC: 24 MG/DL
LDL CHOLESTEROL CALCULATED: ABNORMAL MG/DL
LDL CHOLESTEROL DIRECT: 60 MG/DL
TRIGL SERPL-MCNC: 1068 MG/DL

## 2022-03-11 PROCEDURE — 80061 LIPID PANEL: CPT

## 2022-03-11 PROCEDURE — 83721 ASSAY OF BLOOD LIPOPROTEIN: CPT

## 2022-03-11 PROCEDURE — 76882 US LMTD JT/FCL EVL NVASC XTR: CPT

## 2022-03-11 PROCEDURE — 36415 COLL VENOUS BLD VENIPUNCTURE: CPT

## 2022-03-11 RX ORDER — GEMFIBROZIL 600 MG/1
600 TABLET, FILM COATED ORAL 2 TIMES DAILY
Qty: 60 TABLET | Refills: 2 | Status: SHIPPED | OUTPATIENT
Start: 2022-03-11 | End: 2022-04-28 | Stop reason: SDUPTHER

## 2022-03-11 NOTE — TELEPHONE ENCOUNTER
Pt called stating that his Triglycerides are very elevated and wants to know how to bring that level down. Please advise.

## 2022-03-23 ENCOUNTER — TELEPHONE (OUTPATIENT)
Dept: INTERNAL MEDICINE CLINIC | Age: 37
End: 2022-03-23

## 2022-03-23 ENCOUNTER — NURSE ONLY (OUTPATIENT)
Dept: INTERNAL MEDICINE CLINIC | Age: 37
End: 2022-03-23
Payer: MEDICARE

## 2022-03-23 VITALS
DIASTOLIC BLOOD PRESSURE: 82 MMHG | SYSTOLIC BLOOD PRESSURE: 118 MMHG | HEART RATE: 79 BPM | OXYGEN SATURATION: 98 % | RESPIRATION RATE: 20 BRPM

## 2022-03-23 DIAGNOSIS — R93.89 ABNORMAL ULTRASOUND: ICD-10-CM

## 2022-03-23 DIAGNOSIS — R03.0 ELEVATED BLOOD PRESSURE READING: ICD-10-CM

## 2022-03-23 DIAGNOSIS — M79.651 PAIN OF RIGHT THIGH: Primary | ICD-10-CM

## 2022-03-23 DIAGNOSIS — M79.89 SOFT TISSUE MASS: ICD-10-CM

## 2022-03-23 PROCEDURE — 99211 OFF/OP EST MAY X REQ PHY/QHP: CPT | Performed by: FAMILY MEDICINE

## 2022-03-23 NOTE — LETTER
[address] [place] [address] Internal and Family Medicine  [address] [place] [address]  Phone: [phone]  Fax: [phone]    [facility]        March 23, 2022     Patient: Rosa Aaron   YOB: 1985   Date of Visit: 3/23/2022       To Whom it May Concern:    Tommy Gonzalez was seen in my clinic on 3/23/2022. He may return to work on 3/24/2022. If you have any questions or concerns, please don't hesitate to call.     Sincerely,         María Townsend MA

## 2022-03-23 NOTE — PROGRESS NOTES
Pt presents for BP check. At last OV he had reported elevated readings. 118/82 today. 138/90 at last OV on 3/09/22. He has not checked BP since last OV. Pt advised to continue current regimen. Office will call if PCP has any additional instruction. Per pt, he did not know US of RLE result completed on 3/11/22. Apologized to pt, for any lapse in communication, as it was documented that pt was informed. Per result and PCP, MRI ordered to further f/u on abnormal US. Pt scheduled with PCP for f/u on other conditions.

## 2022-03-23 NOTE — TELEPHONE ENCOUNTER
Please see nurse visit this date. Pt advised to continue current regimen. Please advise, if any further direction. Otherwise, No further action is needed, at this time.

## 2022-04-08 ENCOUNTER — HOSPITAL ENCOUNTER (OUTPATIENT)
Dept: MRI IMAGING | Age: 37
Discharge: HOME | End: 2022-04-08
Payer: MEDICARE

## 2022-04-08 DIAGNOSIS — M79.89 SOFT TISSUE MASS: ICD-10-CM

## 2022-04-08 DIAGNOSIS — M79.651 PAIN OF RIGHT THIGH: ICD-10-CM

## 2022-04-08 DIAGNOSIS — R93.89 ABNORMAL ULTRASOUND: ICD-10-CM

## 2022-04-08 PROCEDURE — A9579 GAD-BASE MR CONTRAST NOS,1ML: HCPCS | Performed by: FAMILY MEDICINE

## 2022-04-08 PROCEDURE — 73720 MRI LWR EXTREMITY W/O&W/DYE: CPT

## 2022-04-08 PROCEDURE — 6360000004 HC RX CONTRAST MEDICATION: Performed by: FAMILY MEDICINE

## 2022-04-08 RX ADMIN — GADOTERIDOL 20 ML: 279.3 INJECTION, SOLUTION INTRAVENOUS at 08:44

## 2022-04-11 DIAGNOSIS — R22.41 MASS OF RIGHT LOWER EXTREMITY: Primary | ICD-10-CM

## 2022-04-27 ENCOUNTER — OFFICE VISIT (OUTPATIENT)
Dept: SURGERY | Age: 37
End: 2022-04-27
Payer: MEDICARE

## 2022-04-27 VITALS — HEART RATE: 76 BPM | OXYGEN SATURATION: 100 % | SYSTOLIC BLOOD PRESSURE: 118 MMHG | DIASTOLIC BLOOD PRESSURE: 70 MMHG

## 2022-04-27 DIAGNOSIS — M79.89 SOFT TISSUE MASS: Primary | ICD-10-CM

## 2022-04-27 PROCEDURE — 99204 OFFICE O/P NEW MOD 45 MIN: CPT | Performed by: SURGERY

## 2022-04-27 RX ORDER — SODIUM CHLORIDE 0.9 % (FLUSH) 0.9 %
5-40 SYRINGE (ML) INJECTION PRN
Status: CANCELLED | OUTPATIENT
Start: 2022-04-27

## 2022-04-27 RX ORDER — SODIUM CHLORIDE 9 MG/ML
INJECTION, SOLUTION INTRAVENOUS CONTINUOUS
Status: CANCELLED | OUTPATIENT
Start: 2022-04-27

## 2022-04-27 RX ORDER — SODIUM CHLORIDE 9 MG/ML
INJECTION, SOLUTION INTRAVENOUS PRN
Status: CANCELLED | OUTPATIENT
Start: 2022-04-27

## 2022-04-27 RX ORDER — SODIUM CHLORIDE 0.9 % (FLUSH) 0.9 %
5-40 SYRINGE (ML) INJECTION EVERY 12 HOURS SCHEDULED
Status: CANCELLED | OUTPATIENT
Start: 2022-04-27

## 2022-04-27 ASSESSMENT — ENCOUNTER SYMPTOMS
BLOOD IN STOOL: 0
NAUSEA: 0
VOMITING: 0
BACK PAIN: 0
CHOKING: 0
COLOR CHANGE: 0
COUGH: 0
TROUBLE SWALLOWING: 0
STRIDOR: 0
SORE THROAT: 0
ABDOMINAL DISTENTION: 0
ABDOMINAL PAIN: 0
SHORTNESS OF BREATH: 0

## 2022-04-27 NOTE — H&P
SUBJECTIVE:  HPI:     Patient is here with right lateral thigh mass. Started July of last year after being chased by a dog and had a fall on right hip/thigh. He had a large hematoma with ecchymosis over right thigh/buttock. Hematoma/ecchymosis resolved but he continued to have right thigh pain. Worse with walking but worse throughout the day. By the end of the day pain is up to 5 out of 10 and he is having difficulty walking. He had ultrasound and subsequent MRI showing a approximately 12 cm fluid collection with 2 solid foci with largest 3.2 cm. Pain is generally over area of this collection. Pain is worse with palpation of the mass. Pain does not radiate below the knee. The larger mass that he can feel it does seem to move up and down the thigh. Here today for evaluation of this lesion.     Past Surgical History:   Procedure Laterality Date    EYE SURGERY      for amblyopia    TONSILLECTOMY AND ADENOIDECTOMY      TYMPANOSTOMY TUBE PLACEMENT       Past Medical History:   Diagnosis Date    ADHD     Anxiety     Bipolar 2 disorder (HCC)     Chronic low back pain     Depression     Seizures ([address]      Family History   Problem Relation Age of Onset    Ovarian Cancer Mother     Bipolar Disorder Mother     Lung Cancer Father     Other Brother         cerebral palsy    Diabetes Maternal Grandmother     Diabetes Paternal Grandmother      Social History     Socioeconomic History    Marital status: Legally      Spouse name: Not on file    Number of children: 3    Years of education: Not on file    Highest education level: Not on file   Occupational History    Occupation: [employer]   Tobacco Use    Smoking status: Former Smoker    Smokeless tobacco: Never Used   Substance and Sexual Activity    Alcohol use: Yes     Comment: occasional    Drug use: Not Currently     Types: Marijuana Emilia Ing)    Sexual activity: Yes     Partners: Female   Other Topics Concern    Not on file   Social History Narrative    Not on file     Social Determinants of Health     Financial Resource Strain: Low Risk     Difficulty of Paying Living Expenses: Not hard at all   Food Insecurity: No Food Insecurity    Worried About Running Out of Food in the Last Year: Never true    [address] in the Last Year: Never true   Transportation Needs:     Lack of Transportation (Medical): Not on file    Lack of Transportation (Non-Medical): Not on file   Physical Activity:     Days of Exercise per Week: Not on file    Minutes of Exercise per Session: Not on file   Stress:     Feeling of Stress : Not on file   Social Connections:     Frequency of Communication with Friends and Family: Not on file    Frequency of Social Gatherings with Friends and Family: Not on file    Attends Muslim Services: Not on file    Active Member of [address] [place] [address] [employer] or Organizations: Not on file    Attends Club or Organization Meetings: Not on file    Marital Status: Not on file   Intimate Partner Violence:     Fear of Current or Ex-Partner: Not on file    Emotionally Abused: Not on file    Physically Abused: Not on file    Sexually Abused: Not on file   Housing Stability:     Unable to Pay for Housing in the Last Year: Not on file    Number of [place] in the Last Year: Not on file    Unstable Housing in the Last Year: Not on file       Current Outpatient Medications   Medication Sig Dispense Refill    gemfibrozil (LOPID) 600 MG tablet Take 1 tablet by mouth 2 times daily 60 tablet 2    ibuprofen (IBU) 600 MG tablet Take 1 tablet by mouth every 6 hours as needed for Pain 20 tablet 0    gabapentin (NEURONTIN) 300 MG capsule Take 1 capsule by mouth 2 times daily for 30 days.  (Patient not taking: Reported on 3/9/2022) 60 capsule 0    traZODone (DESYREL) 50 MG tablet       PARoxetine HCl (PAXIL PO) Take by mouth      methylphenidate (RITALIN) 20 MG tablet Take one and one-half a day      ARIPiprazole (ABILIFY PO) Take by mouth No current facility-administered medications for this visit. Allergies   Allergen Reactions    Amoxicillin Diarrhea       Review of Systems:         Review of Systems   Constitutional: Negative for chills, fatigue, fever and unexpected weight change. HENT: Negative for sore throat and trouble swallowing. Respiratory: Negative for cough, choking, shortness of breath and stridor. Cardiovascular: Negative for chest pain, palpitations and leg swelling. Gastrointestinal: Negative for abdominal distention, abdominal pain, blood in stool, nausea and vomiting. Musculoskeletal: Negative for back pain, gait problem and joint swelling. Right lateral thigh pain and mass   Skin: Negative for color change, rash and wound. Allergic/Immunologic: Negative for immunocompromised state. Neurological: Negative for dizziness, speech difficulty, weakness and light-headedness. Hematological: Negative for adenopathy. Does not bruise/bleed easily. Psychiatric/Behavioral: Negative for agitation and confusion. The patient is not nervous/anxious. OBJECTIVE:  Physical Exam:    /70   Pulse 76   SpO2 100%      Physical Exam  General: No acute distress  Neck: No JVD, supple  Lungs: Chest rise equal bilaterally  Cardiac: Appears well perfused   Abdomen: Soft, nontender, nondistended, no rebound or guarding  Extremities, no edema, cyanosis, or clubbing. Right lateral thigh small mobile firm soft tissue mass mid right lateral thigh. Tenderness with palpation over following general but worse with palpation of this lesion. Skin: No rashes or breakdown  Neurologic: cranial nerves II - XII grossly intact    US:   1. Hyperechoic lesions measuring up to 3.2 cm and 1.1 cm with a small amount  of surrounding fluid.  Given the clinical history, findings may represent  unresolved hematomas or Yetta Leonela generally over this area. Lesion.  MRI or CT can be obtained as  clinically warranted.       MRI:  Thin crescentic fluid collection with internal ovoid solid nodules  correlating with findings on prior ultrasound exams as above.  Differential  considerations are felt to include chronic hematoma or chronic Rogers-Merlyn  lesion, either of which may have superimposed foci of fat necrosis. Potentially chronic foreign body reaction could also result in this  appearance.  Abscess or neoplastic process felt unlikely.  Recommend  continued clinical follow-up and if there is enlargement or worsening  symptoms associated with the region reimaging could be performed versus  aspiration/soft tissue sampling. ASSESSMENT:  1. Soft tissue mass          PLAN:    Imaging reviewed. Likely related to trauma and benign. Discussed watching versus biopsy versus surgical excision. Although it is benign he would not like to manage this conservatively due to pain. Pain is worse over the palpable mass. We did discuss excision may not completely resolve the pain and he may have persistent symptoms after excision. He states understanding but still like to have this excised. We did discuss aspiration/biopsy which he would not like to pursue first and therefore we will proceed with surgical excision. We discussed risk and benefits including ongoing symptoms/pain after procedure, bleeding, infection, wound healing problems, possible need for additional surgery if for instance pathology shows neoplasm. He states understanding elected proceed with procedure. No orders of the defined types were placed in this encounter. No orders of the defined types were placed in this encounter. Follow Up:  No follow-ups on file.       Manda Del Angel,

## 2022-04-27 NOTE — H&P (VIEW-ONLY)
SUBJECTIVE:  HPI:     Patient is here with right lateral thigh mass. Started July of last year after being chased by a dog and had a fall on right hip/thigh. He had a large hematoma with ecchymosis over right thigh/buttock. Hematoma/ecchymosis resolved but he continued to have right thigh pain. Worse with walking but worse throughout the day. By the end of the day pain is up to 5 out of 10 and he is having difficulty walking. He had ultrasound and subsequent MRI showing a approximately 12 cm fluid collection with 2 solid foci with largest 3.2 cm. Pain is generally over area of this collection. Pain is worse with palpation of the mass. Pain does not radiate below the knee. The larger mass that he can feel it does seem to move up and down the thigh. Here today for evaluation of this lesion.     Past Surgical History:   Procedure Laterality Date    EYE SURGERY      for amblyopia    TONSILLECTOMY AND ADENOIDECTOMY      TYMPANOSTOMY TUBE PLACEMENT       Past Medical History:   Diagnosis Date    ADHD     Anxiety     Bipolar 2 disorder (HCC)     Chronic low back pain     Depression     Seizures ([address]      Family History   Problem Relation Age of Onset    Ovarian Cancer Mother     Bipolar Disorder Mother     Lung Cancer Father     Other Brother         cerebral palsy    Diabetes Maternal Grandmother     Diabetes Paternal Grandmother      Social History     Socioeconomic History    Marital status: Legally      Spouse name: Not on file    Number of children: 3    Years of education: Not on file    Highest education level: Not on file   Occupational History    Occupation: [employer]   Tobacco Use    Smoking status: Former Smoker    Smokeless tobacco: Never Used   Substance and Sexual Activity    Alcohol use: Yes     Comment: occasional    Drug use: Not Currently     Types: Marijuana Gaynelle Pillo)    Sexual activity: Yes     Partners: Female   Other Topics Concern    Not on file   Social History Narrative    Not on file     Social Determinants of Health     Financial Resource Strain: Low Risk     Difficulty of Paying Living Expenses: Not hard at all   Food Insecurity: No Food Insecurity    Worried About Running Out of Food in the Last Year: Never true    [address] in the Last Year: Never true   Transportation Needs:     Lack of Transportation (Medical): Not on file    Lack of Transportation (Non-Medical): Not on file   Physical Activity:     Days of Exercise per Week: Not on file    Minutes of Exercise per Session: Not on file   Stress:     Feeling of Stress : Not on file   Social Connections:     Frequency of Communication with Friends and Family: Not on file    Frequency of Social Gatherings with Friends and Family: Not on file    Attends Congregation Services: Not on file    Active Member of [address] [place] [address] [employer] or Organizations: Not on file    Attends Club or Organization Meetings: Not on file    Marital Status: Not on file   Intimate Partner Violence:     Fear of Current or Ex-Partner: Not on file    Emotionally Abused: Not on file    Physically Abused: Not on file    Sexually Abused: Not on file   Housing Stability:     Unable to Pay for Housing in the Last Year: Not on file    Number of [place] in the Last Year: Not on file    Unstable Housing in the Last Year: Not on file       Current Outpatient Medications   Medication Sig Dispense Refill    gemfibrozil (LOPID) 600 MG tablet Take 1 tablet by mouth 2 times daily 60 tablet 2    ibuprofen (IBU) 600 MG tablet Take 1 tablet by mouth every 6 hours as needed for Pain 20 tablet 0    gabapentin (NEURONTIN) 300 MG capsule Take 1 capsule by mouth 2 times daily for 30 days.  (Patient not taking: Reported on 3/9/2022) 60 capsule 0    traZODone (DESYREL) 50 MG tablet       PARoxetine HCl (PAXIL PO) Take by mouth      methylphenidate (RITALIN) 20 MG tablet Take one and one-half a day      ARIPiprazole (ABILIFY PO) Take by mouth No current facility-administered medications for this visit. Allergies   Allergen Reactions    Amoxicillin Diarrhea       Review of Systems:         Review of Systems   Constitutional: Negative for chills, fatigue, fever and unexpected weight change. HENT: Negative for sore throat and trouble swallowing. Respiratory: Negative for cough, choking, shortness of breath and stridor. Cardiovascular: Negative for chest pain, palpitations and leg swelling. Gastrointestinal: Negative for abdominal distention, abdominal pain, blood in stool, nausea and vomiting. Musculoskeletal: Negative for back pain, gait problem and joint swelling. Right lateral thigh pain and mass   Skin: Negative for color change, rash and wound. Allergic/Immunologic: Negative for immunocompromised state. Neurological: Negative for dizziness, speech difficulty, weakness and light-headedness. Hematological: Negative for adenopathy. Does not bruise/bleed easily. Psychiatric/Behavioral: Negative for agitation and confusion. The patient is not nervous/anxious. OBJECTIVE:  Physical Exam:    /70   Pulse 76   SpO2 100%      Physical Exam  General: No acute distress  Neck: No JVD, supple  Lungs: Chest rise equal bilaterally  Cardiac: Appears well perfused   Abdomen: Soft, nontender, nondistended, no rebound or guarding  Extremities, no edema, cyanosis, or clubbing. Right lateral thigh small mobile firm soft tissue mass mid right lateral thigh. Tenderness with palpation over following general but worse with palpation of this lesion. Skin: No rashes or breakdown  Neurologic: cranial nerves II - XII grossly intact    US:   1. Hyperechoic lesions measuring up to 3.2 cm and 1.1 cm with a small amount  of surrounding fluid.  Given the clinical history, findings may represent  unresolved hematomas or Jurgen Bless generally over this area. Lesion.  MRI or CT can be obtained as  clinically warranted.       MRI:  Thin crescentic fluid collection with internal ovoid solid nodules  correlating with findings on prior ultrasound exams as above.  Differential  considerations are felt to include chronic hematoma or chronic Rogers-Merlyn  lesion, either of which may have superimposed foci of fat necrosis. Potentially chronic foreign body reaction could also result in this  appearance.  Abscess or neoplastic process felt unlikely.  Recommend  continued clinical follow-up and if there is enlargement or worsening  symptoms associated with the region reimaging could be performed versus  aspiration/soft tissue sampling. ASSESSMENT:  1. Soft tissue mass          PLAN:    Imaging reviewed. Likely related to trauma and benign. Discussed watching versus biopsy versus surgical excision. Although it is benign he would not like to manage this conservatively due to pain. Pain is worse over the palpable mass. We did discuss excision may not completely resolve the pain and he may have persistent symptoms after excision. He states understanding but still like to have this excised. We did discuss aspiration/biopsy which he would not like to pursue first and therefore we will proceed with surgical excision. We discussed risk and benefits including ongoing symptoms/pain after procedure, bleeding, infection, wound healing problems, possible need for additional surgery if for instance pathology shows neoplasm. He states understanding elected proceed with procedure. No orders of the defined types were placed in this encounter. No orders of the defined types were placed in this encounter. Follow Up:  No follow-ups on file.       Júnior Moles, DO

## 2022-04-28 ENCOUNTER — TELEPHONE (OUTPATIENT)
Dept: SURGERY | Age: 37
End: 2022-04-28

## 2022-04-28 ENCOUNTER — OFFICE VISIT (OUTPATIENT)
Dept: INTERNAL MEDICINE CLINIC | Age: 37
End: 2022-04-28
Payer: MEDICARE

## 2022-04-28 VITALS
HEART RATE: 69 BPM | HEIGHT: 74 IN | OXYGEN SATURATION: 97 % | SYSTOLIC BLOOD PRESSURE: 118 MMHG | BODY MASS INDEX: 28.75 KG/M2 | DIASTOLIC BLOOD PRESSURE: 76 MMHG | WEIGHT: 224 LBS

## 2022-04-28 DIAGNOSIS — G62.9 NEUROPATHY: ICD-10-CM

## 2022-04-28 DIAGNOSIS — M51.36 DDD (DEGENERATIVE DISC DISEASE), LUMBAR: ICD-10-CM

## 2022-04-28 DIAGNOSIS — M79.89 SOFT TISSUE MASS: ICD-10-CM

## 2022-04-28 DIAGNOSIS — E78.1 HYPERTRIGLYCERIDEMIA: Primary | ICD-10-CM

## 2022-04-28 DIAGNOSIS — Z79.899 LONG TERM USE OF DRUG: ICD-10-CM

## 2022-04-28 PROCEDURE — G8419 CALC BMI OUT NRM PARAM NOF/U: HCPCS | Performed by: FAMILY MEDICINE

## 2022-04-28 PROCEDURE — 99213 OFFICE O/P EST LOW 20 MIN: CPT | Performed by: FAMILY MEDICINE

## 2022-04-28 PROCEDURE — 1036F TOBACCO NON-USER: CPT | Performed by: FAMILY MEDICINE

## 2022-04-28 PROCEDURE — G8427 DOCREV CUR MEDS BY ELIG CLIN: HCPCS | Performed by: FAMILY MEDICINE

## 2022-04-28 RX ORDER — GEMFIBROZIL 600 MG/1
600 TABLET, FILM COATED ORAL 2 TIMES DAILY
Qty: 60 TABLET | Refills: 2 | Status: SHIPPED | OUTPATIENT
Start: 2022-04-28 | End: 2022-09-21 | Stop reason: SDUPTHER

## 2022-04-28 ASSESSMENT — ENCOUNTER SYMPTOMS
SHORTNESS OF BREATH: 0
COUGH: 0
NAUSEA: 0
ABDOMINAL PAIN: 0
BACK PAIN: 1

## 2022-04-28 NOTE — PROGRESS NOTES
Gaudencio Jackson (:  1985) is a 39 y.o. male,Established patient, here for evaluation of the following chief complaint(s):  Back Pain (lower, chronic), Numbness (left leg intermittent), and Other         ASSESSMENT/PLAN:  1. Hypertriglyceridemia, chronic  -     gemfibrozil (LOPID) 600 MG tablet; Take 1 tablet by mouth 2 times daily, Disp-60 tablet, R-2Normal  -     Lipid Panel; Future  2. DDD (degenerative disc disease), lumbar, chronic  3. Soft tissue mass- R thigh  Patient scheduled for surgery   4. Neuropathy  5. Long term use of drug  -     Hepatic Function Panel; Future    Pt to wait on aany additional treatment  Of his back until he has his surgery   Persist RTO or call  On this date 2022 I have spent 20 minutes reviewing previous notes, test results and face to face with the patient discussing the diagnosis and importance of compliance with the treatment plan as well as documenting on the day of the visit. Return for follow up  in 2 to 3 months for HLD.      21 MRI Femur Right W and WO Contrast  Impression Thin crescentic fluid collection with internal ovoid solid nodules correlating with findings on prior ultrasound exams as above. Differential considerations are felt to include chronic hematoma or chronic Rogers-Merlyn lesion, either of which may have superimposed foci of fat necrosis. Potentially chronic foreign body reaction could also result in this appearance. Abscess or neoplastic process felt unlikely. Recommend continued clinical follow-up and if there is enlargement or worsening symptoms associated with the region reimaging could be performed versus aspiration/soft tissue sampling. 8/3/21 XR Lumbar Spine  Impression Mild bilateral degenerative arthritic change in the sacroiliac joints which appear symmetric without erosive change. Degenerative disc disease at the lumbosacral junction.        Subjective   SUBJECTIVE/OBJECTIVE:  HPI: This  38 yo M here for the following  Patient Active Problem List    Diagnosis Date Noted    Chronic low back pain     Bipolar 2 disorder ([facility] [address]     Anxiety     ADHD     Depression        Soft tissue mass in R lateral thigh- Wcomp issue-he will see their doctor also for RLE- he may go to permanent partial disability. He will have surgery soon. This was scheduled  Today  DDD-lumbar. He states he can also have symptoms  to his LLE- intermittent. Previous EMG 2020 - peroneal neuropathy and L meralgia paresthetica. This is the same. . No new GI or  symptoms. No saddle anesthesia. He has been in pain management in the past. No help with Gabapentin. He did not do PT. He does not want to do further evaluation at this time due to surgery pending  HLD- increased TG- Stable on medication    Review of Systems   Constitutional: Negative for diaphoresis and fever. Respiratory: Negative for cough and shortness of breath. Cardiovascular: Negative for chest pain and palpitations. Gastrointestinal: Negative for abdominal pain and nausea. Genitourinary: Negative for difficulty urinating. Musculoskeletal: Positive for back pain. Neurological: Positive for numbness (neuropathy). Negative for dizziness and headaches. Allergies   Allergen Reactions    Amoxicillin Diarrhea     Current Outpatient Medications   Medication Sig Dispense Refill    gemfibrozil (LOPID) 600 MG tablet Take 1 tablet by mouth 2 times daily 60 tablet 2    traZODone (DESYREL) 50 MG tablet nightly PRN      PARoxetine HCl (PAXIL PO) Take by mouth      methylphenidate (RITALIN) 20 MG tablet Take one and one-half a day      ARIPiprazole (ABILIFY PO) Take by mouth       No current facility-administered medications for this visit. Vitals:    04/28/22 1118   BP: 118/76   Site: Left Upper Arm   Position: Sitting   Cuff Size: Medium Adult   Pulse: 69   SpO2: 97%   Weight: 224 lb (101.6 kg)   Height: 6' 2\" (1.88 m)     Objective   Physical Exam  Vitals reviewed. Constitutional:       General: He is not in acute distress. Eyes:      Conjunctiva/sclera: Conjunctivae normal.   Cardiovascular:      Rate and Rhythm: Normal rate and regular rhythm. Pulmonary:      Effort: Pulmonary effort is normal. No respiratory distress. Breath sounds: Normal breath sounds. Abdominal:      General: Bowel sounds are normal.      Palpations: Abdomen is soft. Tenderness: There is no abdominal tenderness. Musculoskeletal:         General: Tenderness (discomfort lower back and R thigh) present. Cervical back: Neck supple. Right lower leg: No edema. Left lower leg: No edema. Neurological:      Mental Status: He is alert and oriented to person, place, and time. Sensory: Sensory deficit (sensory change to L thigh) present. Motor: No weakness. Gait: Gait normal.   Psychiatric:         Mood and Affect: Mood normal.                An electronic signature was used to authenticate this note.     --Carry DO Owen

## 2022-04-28 NOTE — PROGRESS NOTES
Covid test done 4/28/22    Surgery is at [facility] on 5/5/22. You will be called on 5/4/22  with times. 1. Do not eat or drink anything after midnight - unless instructed by your doctor prior to surgery. This includes no water, chewing gum or mints. 2. Follow your directions as prescribed by the doctor for your procedure and medications. 3. Check with your Doctor regarding stopping vitamins, supplements, blood thinners (Plavix, Coumadin, Lovenox, Effient, Pradaxa, Xarelto, Fragmin or other blood thinners) and follow their instructions. Stop all supplements and herbals 7 days prior to surgery. Hold NSAID's 5 days prior to surgery - ibuprofen. Morning of surgery take Abilify with a sip of water. 4. Do not smoke, and do not drink any alcoholic beverages 24 hours prior to surgery. This includes NA Beer. 5. You may brush your teeth and gargle the morning of surgery. DO NOT SWALLOW WATER   6. You MUST make arrangements for a responsible adult to take you home after your surgery and be able to check on you every couple hours for the day. You will not be allowed to leave alone or drive yourself home. It is strongly suggested someone stay with you the first 24  hrs. Your surgery will be cancelled if you do not have a ride home. 7. Please wear simple, loose fitting clothing to the hospital.  Yuli Modi not bring valuables (money, credit cards, checkbooks, etc.) Do not wear any makeup (including no eye makeup) or nail polish on your fingers or toes. 8. DO NOT wear any jewelry or piercings on day of surgery. All body piercing jewelry must be removed. 9. If you have dentures, they will be removed before going to the OR; we will provide you a container. If you wear contact lenses or glasses,  they will be removed; please bring a case for them. 10. If you  have a Living Will and Durable Power of  for Healthcare, please bring in a copy.            11. Please bring picture ID,  insurance card, paperwork from the doctors office    (H & P, Consent, & card for implantable devices). 12. Take a shower the night before or morning of your procedure, do not apply any lotion, oil or powder. It is recommended to use Hibiclens or CHG wash during pre-op shower/bath.            13. Wear a mask covering your nose & mouth when entering the hospital.

## 2022-05-04 ENCOUNTER — HOSPITAL ENCOUNTER (OUTPATIENT)
Age: 37
Discharge: HOME | End: 2022-05-04
Payer: MEDICARE

## 2022-05-04 ENCOUNTER — ANESTHESIA EVENT (OUTPATIENT)
Dept: OPERATING ROOM | Age: 37
End: 2022-05-04
Payer: MEDICARE

## 2022-05-04 DIAGNOSIS — Z79.899 LONG TERM USE OF DRUG: ICD-10-CM

## 2022-05-04 DIAGNOSIS — E78.1 HYPERTRIGLYCERIDEMIA: ICD-10-CM

## 2022-05-04 LAB
ALBUMIN SERPL-MCNC: 4.4 GM/DL (ref 3.4–5)
ALP BLD-CCNC: 86 IU/L (ref 40–129)
ALT SERPL-CCNC: 37 U/L (ref 10–40)
AST SERPL-CCNC: 20 IU/L (ref 15–37)
BILIRUB SERPL-MCNC: 0.8 MG/DL (ref 0–1)
BILIRUBIN DIRECT: 0.2 MG/DL (ref 0–0.3)
BILIRUBIN, INDIRECT: 0.6 MG/DL (ref 0–0.7)
CHOLESTEROL: 197 MG/DL
HDLC SERPL-MCNC: 38 MG/DL
LDL CHOLESTEROL CALCULATED: 102 MG/DL
TOTAL PROTEIN: 6.3 GM/DL (ref 6.4–8.2)
TRIGL SERPL-MCNC: 287 MG/DL

## 2022-05-04 PROCEDURE — 80061 LIPID PANEL: CPT

## 2022-05-04 PROCEDURE — 36415 COLL VENOUS BLD VENIPUNCTURE: CPT

## 2022-05-04 PROCEDURE — 80076 HEPATIC FUNCTION PANEL: CPT

## 2022-05-04 NOTE — ANESTHESIA PRE PROCEDURE
Department of Anesthesiology  Preprocedure Note       Name:  Camelia Bumpers   Age:  39 y.o.  :  1985                                          MRN:  [id number]         Date:  2022      Surgeon: Dallas Quintero):  Lázaro Lam DO    Procedure: Procedure(s):  RIGHT LATERAL THIGH LESION BIOPSY EXCISION    Medications prior to admission:   Prior to Admission medications    Medication Sig Start Date End Date Taking? Authorizing Provider   gemfibrozil (LOPID) 600 MG tablet Take 1 tablet by mouth 2 times daily 22   Danish Fortune DO   traZODone (DESYREL) 50 MG tablet nightly PRN 11/15/20   Historical Provider, MD   PARoxetine HCl (PAXIL PO) Take by mouth    Historical Provider, MD   methylphenidate (RITALIN) 20 MG tablet Take one and one-half a day    Historical Provider, MD   ARIPiprazole (ABILIFY PO) Take by mouth    Historical Provider, MD       Current medications:    No current facility-administered medications for this encounter. Current Outpatient Medications   Medication Sig Dispense Refill    gemfibrozil (LOPID) 600 MG tablet Take 1 tablet by mouth 2 times daily 60 tablet 2    traZODone (DESYREL) 50 MG tablet nightly PRN      PARoxetine HCl (PAXIL PO) Take by mouth      methylphenidate (RITALIN) 20 MG tablet Take one and one-half a day      ARIPiprazole (ABILIFY PO) Take by mouth         Allergies: Allergies   Allergen Reactions    Amoxicillin Diarrhea       Problem List:    Patient Active Problem List   Diagnosis Code    Chronic low back pain M54.50, G89.29    Bipolar 2 disorder (HCC) F31.81    Anxiety F41.9    ADHD F90.9    Depression F32. 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, S1    Depression     Hyperlipidemia     Neuropathy 2020    Seizures ([facility] [address]        Past Surgical History:        Procedure Laterality Date    EYE SURGERY      for amblyopia    TONSILLECTOMY AND ADENOIDECTOMY      TYMPANOSTOMY TUBE PLACEMENT         Social History:    Social History     Tobacco Use    Smoking status: Former Smoker    Smokeless tobacco: Never Used   Substance Use Topics    Alcohol use: Not Currently     Comment: occasional                                Counseling given: Not Answered      Vital Signs (Current):   Vitals:    04/28/22 1416   Weight: 224 lb (101.6 kg)   Height: 6' 2\" (1.88 m)                                              BP Readings from Last 3 Encounters:   04/28/22 118/76   04/27/22 118/70   03/23/22 118/82       NPO Status:                                                                                 BMI:   Wt Readings from Last 3 Encounters:   04/28/22 224 lb (101.6 kg)   03/09/22 226 lb (102.5 kg)   08/03/21 230 lb (104.3 kg)     Body mass index is 28.76 kg/m². CBC:   Lab Results   Component Value Date    WBC 5.7 12/13/2021    RBC 5.50 12/13/2021    HGB 17.0 12/13/2021    HCT 50.0 12/13/2021    MCV 90.9 12/13/2021    RDW 11.9 12/13/2021     12/13/2021       CMP:   Lab Results   Component Value Date     12/13/2021    K 4.4 12/13/2021     12/13/2021    CO2 26 12/13/2021    BUN 11 12/13/2021    CREATININE 1.0 12/13/2021    GFRAA >60 12/13/2021    LABGLOM >60 12/13/2021    GLUCOSE 98 12/13/2021    PROT 7.1 12/13/2021    CALCIUM 9.3 12/13/2021    BILITOT 0.7 12/13/2021    ALKPHOS 101 12/13/2021    AST 20 12/13/2021    ALT 38 12/13/2021       POC Tests: No results for input(s): POCGLU, POCNA, POCK, POCCL, POCBUN, POCHEMO, POCHCT in the last 72 hours.     Coags: No results found for: PROTIME, INR, APTT    HCG (If Applicable): No results found for: PREGTESTUR, PREGSERUM, HCG, HCGQUANT     ABGs: No results found for: PHART, PO2ART, WLG3TXO, LGG2FZL, BEART, D2HQNKCU     Type & Screen (If Applicable):  No results found for: LABABO, LABRH    Drug/Infectious Status (If Applicable):  No results found for: HIV, HEPCAB    COVID-19 Screening (If Applicable):   Lab Results   Component Value Date    COVID19 NOT DETECTED 05/02/2021           Anesthesia Evaluation  Patient summary reviewed  Airway: Mallampati: II  TM distance: >3 FB   Neck ROM: full  Mouth opening: > = 3 FB Dental:    (+) edentulous      Pulmonary:normal exam                              ROS comment: Former Smoker   Cardiovascular:Negative CV ROS    (+) hyperlipidemia         Beta Blocker:  Not on Beta Blocker         Neuro/Psych:   (+) seizures:, psychiatric history:depression/anxiety              ROS comment: Bipolar 2 disorder GI/Hepatic/Renal: Neg GI/Hepatic/Renal ROS            Endo/Other: Negative Endo/Other ROS                    Abdominal:             Vascular: negative vascular ROS. Other Findings:           Anesthesia Plan      MAC     ASA 2       Induction: intravenous. MIPS: Postoperative opioids intended. Anesthetic plan and risks discussed with patient. Plan discussed with CRNA. Attending anesthesiologist reviewed and agrees with Pre Eval content            LISBET Whittington - CRNA   5/4/2022         Pre Anesthesia Assessment complete.  Chart reviewed on 5/4/2022

## 2022-05-04 NOTE — PROGRESS NOTES
Called and confirmed with patient surgery at [facility]  On 5/5/22  at 0730 with an arrival time of  0600. Come into the Main entrance and check in. You can bring two people with you and wear a mask. Patient verbalized understanding.

## 2022-05-05 ENCOUNTER — HOSPITAL ENCOUNTER (OUTPATIENT)
Age: 37
Setting detail: OUTPATIENT SURGERY
Discharge: HOME | End: 2022-05-05
Attending: SURGERY | Admitting: SURGERY
Payer: MEDICARE

## 2022-05-05 ENCOUNTER — ANESTHESIA (OUTPATIENT)
Dept: OPERATING ROOM | Age: 37
End: 2022-05-05
Payer: MEDICARE

## 2022-05-05 VITALS
RESPIRATION RATE: 16 BRPM | TEMPERATURE: 96.9 F | BODY MASS INDEX: 28.75 KG/M2 | OXYGEN SATURATION: 97 % | WEIGHT: 224 LBS | HEART RATE: 70 BPM | HEIGHT: 74 IN | DIASTOLIC BLOOD PRESSURE: 60 MMHG | SYSTOLIC BLOOD PRESSURE: 124 MMHG

## 2022-05-05 VITALS
DIASTOLIC BLOOD PRESSURE: 37 MMHG | RESPIRATION RATE: 6 BRPM | SYSTOLIC BLOOD PRESSURE: 86 MMHG | OXYGEN SATURATION: 97 % | TEMPERATURE: 97.7 F

## 2022-05-05 DIAGNOSIS — G89.18 POST-OP PAIN: Primary | ICD-10-CM

## 2022-05-05 DIAGNOSIS — M79.89 SOFT TISSUE MASS: ICD-10-CM

## 2022-05-05 PROCEDURE — 7100000000 HC PACU RECOVERY - FIRST 15 MIN: Performed by: SURGERY

## 2022-05-05 PROCEDURE — 6370000000 HC RX 637 (ALT 250 FOR IP): Performed by: ANESTHESIOLOGY

## 2022-05-05 PROCEDURE — 88307 TISSUE EXAM BY PATHOLOGIST: CPT

## 2022-05-05 PROCEDURE — 3700000000 HC ANESTHESIA ATTENDED CARE: Performed by: SURGERY

## 2022-05-05 PROCEDURE — 2720000010 HC SURG SUPPLY STERILE: Performed by: SURGERY

## 2022-05-05 PROCEDURE — 2709999900 HC NON-CHARGEABLE SUPPLY: Performed by: SURGERY

## 2022-05-05 PROCEDURE — 2500000003 HC RX 250 WO HCPCS

## 2022-05-05 PROCEDURE — 3700000001 HC ADD 15 MINUTES (ANESTHESIA): Performed by: SURGERY

## 2022-05-05 PROCEDURE — 2500000003 HC RX 250 WO HCPCS: Performed by: SURGERY

## 2022-05-05 PROCEDURE — 6360000002 HC RX W HCPCS: Performed by: SURGERY

## 2022-05-05 PROCEDURE — 3600000012 HC SURGERY LEVEL 2 ADDTL 15MIN: Performed by: SURGERY

## 2022-05-05 PROCEDURE — 7100000001 HC PACU RECOVERY - ADDTL 15 MIN: Performed by: SURGERY

## 2022-05-05 PROCEDURE — 11406 EXC TR-EXT B9+MARG >4.0 CM: CPT | Performed by: SURGERY

## 2022-05-05 PROCEDURE — 87075 CULTR BACTERIA EXCEPT BLOOD: CPT

## 2022-05-05 PROCEDURE — 7100000010 HC PHASE II RECOVERY - FIRST 15 MIN: Performed by: SURGERY

## 2022-05-05 PROCEDURE — 3600000002 HC SURGERY LEVEL 2 BASE: Performed by: SURGERY

## 2022-05-05 PROCEDURE — 7100000011 HC PHASE II RECOVERY - ADDTL 15 MIN: Performed by: SURGERY

## 2022-05-05 PROCEDURE — 87205 SMEAR GRAM STAIN: CPT

## 2022-05-05 PROCEDURE — 6360000002 HC RX W HCPCS

## 2022-05-05 PROCEDURE — 87070 CULTURE OTHR SPECIMN AEROBIC: CPT

## 2022-05-05 RX ORDER — MIDAZOLAM HYDROCHLORIDE 2 MG/2ML
2 INJECTION, SOLUTION INTRAMUSCULAR; INTRAVENOUS
Status: DISCONTINUED | OUTPATIENT
Start: 2022-05-05 | End: 2022-05-05 | Stop reason: HOSPADM

## 2022-05-05 RX ORDER — SODIUM CHLORIDE 9 MG/ML
INJECTION, SOLUTION INTRAVENOUS CONTINUOUS
Status: DISCONTINUED | OUTPATIENT
Start: 2022-05-05 | End: 2022-05-05 | Stop reason: HOSPADM

## 2022-05-05 RX ORDER — LIDOCAINE HYDROCHLORIDE 10 MG/ML
INJECTION, SOLUTION INFILTRATION; PERINEURAL
Status: COMPLETED | OUTPATIENT
Start: 2022-05-05 | End: 2022-05-05

## 2022-05-05 RX ORDER — DIPHENHYDRAMINE HYDROCHLORIDE 50 MG/ML
12.5 INJECTION INTRAMUSCULAR; INTRAVENOUS
Status: DISCONTINUED | OUTPATIENT
Start: 2022-05-05 | End: 2022-05-05 | Stop reason: HOSPADM

## 2022-05-05 RX ORDER — SODIUM CHLORIDE 9 MG/ML
INJECTION, SOLUTION INTRAVENOUS PRN
Status: DISCONTINUED | OUTPATIENT
Start: 2022-05-05 | End: 2022-05-05 | Stop reason: HOSPADM

## 2022-05-05 RX ORDER — SODIUM CHLORIDE 9 MG/ML
25 INJECTION, SOLUTION INTRAVENOUS PRN
Status: DISCONTINUED | OUTPATIENT
Start: 2022-05-05 | End: 2022-05-05 | Stop reason: HOSPADM

## 2022-05-05 RX ORDER — LIDOCAINE HYDROCHLORIDE 20 MG/ML
INJECTION, SOLUTION EPIDURAL; INFILTRATION; INTRACAUDAL; PERINEURAL PRN
Status: DISCONTINUED | OUTPATIENT
Start: 2022-05-05 | End: 2022-05-05 | Stop reason: SDUPTHER

## 2022-05-05 RX ORDER — DEXTROSE MONOHYDRATE 25 G/50ML
12.5 INJECTION, SOLUTION INTRAVENOUS PRN
Status: DISCONTINUED | OUTPATIENT
Start: 2022-05-05 | End: 2022-05-05 | Stop reason: SDUPTHER

## 2022-05-05 RX ORDER — ONDANSETRON 2 MG/ML
4 INJECTION INTRAMUSCULAR; INTRAVENOUS
Status: DISCONTINUED | OUTPATIENT
Start: 2022-05-05 | End: 2022-05-05 | Stop reason: HOSPADM

## 2022-05-05 RX ORDER — SODIUM CHLORIDE 0.9 % (FLUSH) 0.9 %
5-40 SYRINGE (ML) INJECTION EVERY 12 HOURS SCHEDULED
Status: DISCONTINUED | OUTPATIENT
Start: 2022-05-05 | End: 2022-05-05 | Stop reason: HOSPADM

## 2022-05-05 RX ORDER — FENTANYL CITRATE 50 UG/ML
50 INJECTION, SOLUTION INTRAMUSCULAR; INTRAVENOUS EVERY 5 MIN PRN
Status: DISCONTINUED | OUTPATIENT
Start: 2022-05-05 | End: 2022-05-05 | Stop reason: HOSPADM

## 2022-05-05 RX ORDER — IPRATROPIUM BROMIDE AND ALBUTEROL SULFATE 2.5; .5 MG/3ML; MG/3ML
1 SOLUTION RESPIRATORY (INHALATION)
Status: DISCONTINUED | OUTPATIENT
Start: 2022-05-05 | End: 2022-05-05 | Stop reason: HOSPADM

## 2022-05-05 RX ORDER — SODIUM CHLORIDE 0.9 % (FLUSH) 0.9 %
5-40 SYRINGE (ML) INJECTION PRN
Status: DISCONTINUED | OUTPATIENT
Start: 2022-05-05 | End: 2022-05-05 | Stop reason: HOSPADM

## 2022-05-05 RX ORDER — DEXTROSE MONOHYDRATE 50 MG/ML
100 INJECTION, SOLUTION INTRAVENOUS PRN
Status: DISCONTINUED | OUTPATIENT
Start: 2022-05-05 | End: 2022-05-05 | Stop reason: HOSPADM

## 2022-05-05 RX ORDER — ONDANSETRON 2 MG/ML
INJECTION INTRAMUSCULAR; INTRAVENOUS PRN
Status: DISCONTINUED | OUTPATIENT
Start: 2022-05-05 | End: 2022-05-05 | Stop reason: SDUPTHER

## 2022-05-05 RX ORDER — MEPERIDINE HYDROCHLORIDE 25 MG/ML
12.5 INJECTION INTRAMUSCULAR; INTRAVENOUS; SUBCUTANEOUS EVERY 5 MIN PRN
Status: DISCONTINUED | OUTPATIENT
Start: 2022-05-05 | End: 2022-05-05 | Stop reason: HOSPADM

## 2022-05-05 RX ORDER — SODIUM CHLORIDE, SODIUM LACTATE, POTASSIUM CHLORIDE, CALCIUM CHLORIDE 600; 310; 30; 20 MG/100ML; MG/100ML; MG/100ML; MG/100ML
INJECTION, SOLUTION INTRAVENOUS CONTINUOUS
Status: DISCONTINUED | OUTPATIENT
Start: 2022-05-05 | End: 2022-05-05 | Stop reason: HOSPADM

## 2022-05-05 RX ORDER — PROPOFOL 10 MG/ML
INJECTION, EMULSION INTRAVENOUS PRN
Status: DISCONTINUED | OUTPATIENT
Start: 2022-05-05 | End: 2022-05-05 | Stop reason: SDUPTHER

## 2022-05-05 RX ORDER — CEFAZOLIN SODIUM 2 G/100ML
2000 INJECTION, SOLUTION INTRAVENOUS
Status: COMPLETED | OUTPATIENT
Start: 2022-05-05 | End: 2022-05-05

## 2022-05-05 RX ORDER — HYDRALAZINE HYDROCHLORIDE 20 MG/ML
10 INJECTION INTRAMUSCULAR; INTRAVENOUS
Status: DISCONTINUED | OUTPATIENT
Start: 2022-05-05 | End: 2022-05-05 | Stop reason: HOSPADM

## 2022-05-05 RX ORDER — OXYCODONE HYDROCHLORIDE 5 MG/1
5 TABLET ORAL
Status: COMPLETED | OUTPATIENT
Start: 2022-05-05 | End: 2022-05-05

## 2022-05-05 RX ORDER — HYDROCODONE BITARTRATE AND ACETAMINOPHEN 5; 325 MG/1; MG/1
1 TABLET ORAL EVERY 4 HOURS PRN
Qty: 30 TABLET | Refills: 0 | Status: SHIPPED | OUTPATIENT
Start: 2022-05-05 | End: 2022-05-12

## 2022-05-05 RX ORDER — NICOTINE POLACRILEX 4 MG
15 LOZENGE BUCCAL PRN
Status: DISCONTINUED | OUTPATIENT
Start: 2022-05-05 | End: 2022-05-05 | Stop reason: HOSPADM

## 2022-05-05 RX ORDER — FENTANYL CITRATE 50 UG/ML
INJECTION, SOLUTION INTRAMUSCULAR; INTRAVENOUS PRN
Status: DISCONTINUED | OUTPATIENT
Start: 2022-05-05 | End: 2022-05-05 | Stop reason: SDUPTHER

## 2022-05-05 RX ORDER — DEXAMETHASONE SODIUM PHOSPHATE 4 MG/ML
INJECTION, SOLUTION INTRA-ARTICULAR; INTRALESIONAL; INTRAMUSCULAR; INTRAVENOUS; SOFT TISSUE PRN
Status: DISCONTINUED | OUTPATIENT
Start: 2022-05-05 | End: 2022-05-05 | Stop reason: SDUPTHER

## 2022-05-05 RX ADMIN — PROPOFOL 200 MG: 10 INJECTION, EMULSION INTRAVENOUS at 07:34

## 2022-05-05 RX ADMIN — FENTANYL CITRATE 50 MCG: 50 INJECTION, SOLUTION INTRAMUSCULAR; INTRAVENOUS at 07:57

## 2022-05-05 RX ADMIN — LIDOCAINE HYDROCHLORIDE 100 MG: 20 INJECTION, SOLUTION EPIDURAL; INFILTRATION; INTRACAUDAL; PERINEURAL at 07:34

## 2022-05-05 RX ADMIN — DEXAMETHASONE SODIUM PHOSPHATE 4 MG: 4 INJECTION, SOLUTION INTRAMUSCULAR; INTRAVENOUS at 07:34

## 2022-05-05 RX ADMIN — CEFAZOLIN SODIUM 2000 MG: 2 INJECTION, SOLUTION INTRAVENOUS at 07:20

## 2022-05-05 RX ADMIN — FENTANYL CITRATE 50 MCG: 50 INJECTION, SOLUTION INTRAMUSCULAR; INTRAVENOUS at 08:10

## 2022-05-05 RX ADMIN — OXYCODONE HYDROCHLORIDE 5 MG: 5 TABLET ORAL at 09:46

## 2022-05-05 RX ADMIN — ONDANSETRON 4 MG: 2 INJECTION INTRAMUSCULAR; INTRAVENOUS at 08:54

## 2022-05-05 ASSESSMENT — PULMONARY FUNCTION TESTS
PIF_VALUE: 4
PIF_VALUE: 4
PIF_VALUE: 3
PIF_VALUE: 2
PIF_VALUE: 3
PIF_VALUE: 4
PIF_VALUE: 3
PIF_VALUE: 4
PIF_VALUE: 3
PIF_VALUE: 4
PIF_VALUE: 3
PIF_VALUE: 3
PIF_VALUE: 4
PIF_VALUE: 4
PIF_VALUE: 3
PIF_VALUE: 1
PIF_VALUE: 3
PIF_VALUE: 3
PIF_VALUE: 2
PIF_VALUE: 2
PIF_VALUE: 3
PIF_VALUE: 3
PIF_VALUE: 4
PIF_VALUE: 4
PIF_VALUE: 3
PIF_VALUE: 24
PIF_VALUE: 3
PIF_VALUE: 6
PIF_VALUE: 3
PIF_VALUE: 3
PIF_VALUE: 6
PIF_VALUE: 3
PIF_VALUE: 4
PIF_VALUE: 3
PIF_VALUE: 4
PIF_VALUE: 3
PIF_VALUE: 0
PIF_VALUE: 2
PIF_VALUE: 3
PIF_VALUE: 4
PIF_VALUE: 3
PIF_VALUE: 2
PIF_VALUE: 3
PIF_VALUE: 4
PIF_VALUE: 3
PIF_VALUE: 3
PIF_VALUE: 6
PIF_VALUE: 3

## 2022-05-05 ASSESSMENT — PAIN DESCRIPTION - FREQUENCY
FREQUENCY: CONTINUOUS

## 2022-05-05 ASSESSMENT — PAIN DESCRIPTION - ORIENTATION
ORIENTATION: RIGHT

## 2022-05-05 ASSESSMENT — PAIN DESCRIPTION - DESCRIPTORS
DESCRIPTORS: BURNING

## 2022-05-05 ASSESSMENT — PAIN DESCRIPTION - PAIN TYPE
TYPE: SURGICAL PAIN

## 2022-05-05 ASSESSMENT — PAIN SCALES - GENERAL
PAINLEVEL_OUTOF10: 10
PAINLEVEL_OUTOF10: 5

## 2022-05-05 ASSESSMENT — PAIN - FUNCTIONAL ASSESSMENT
PAIN_FUNCTIONAL_ASSESSMENT: ACTIVITIES ARE NOT PREVENTED
PAIN_FUNCTIONAL_ASSESSMENT: 0-10

## 2022-05-05 ASSESSMENT — PAIN DESCRIPTION - ONSET: ONSET: ON-GOING

## 2022-05-05 ASSESSMENT — PAIN DESCRIPTION - LOCATION
LOCATION: LEG

## 2022-05-05 NOTE — PROGRESS NOTES
0934 Pt. Brought back to unit, bedside report received from Ananya Torres [facility]. Pt. Is A&O, VSS, pt. Given some crackers and something to drink. 0946 Pt. Medicated for pain per MD order. 54 Yelitza Colmenares instructions reviewed with pt and girlfriend via phone, all parties express understanding. 1000 Pt. To Dc home via transport.

## 2022-05-05 NOTE — PROGRESS NOTES
9817 Patient arrived to PACU from OR. Monitors applied and alarms on. No drainage from site. Report from [place] [id number].  4737 Patient turned side to side in bed.  9967 Patient declined ice chips. 2918 Patient transferred out of PACU to same day surgery. Report to [employer].

## 2022-05-05 NOTE — ANESTHESIA POSTPROCEDURE EVALUATION
Department of Anesthesiology  Postprocedure Note    Patient: Laura Cooper  MRN: [id number]  YOB: 1985  Date of evaluation: 5/5/2022  Time:  9:15 AM     Procedure Summary     Date: 05/05/22 Room / Location: [address] [place] [address]    Anesthesia Start: 0730 Anesthesia Stop:     Procedure: RIGHT LATERAL THIGH LESION BIOPSY EXCISION (Right Leg Upper) Diagnosis:       Soft tissue mass      (Soft tissue mass [M79.89])    Surgeons: Oswald Maxwell DO Responsible Provider: Simona Wright MD    Anesthesia Type: MAC ASA Status: 2          Anesthesia Type: No value filed. Alia Phase I: Alia Score: 7    Alia Phase II:      Last vitals: Reviewed and per EMR flowsheets.        Anesthesia Post Evaluation    Patient location during evaluation: PACU  Patient participation: complete - patient participated  Level of consciousness: awake and alert  Pain score: 0  Airway patency: patent  Nausea & Vomiting: no nausea and no vomiting  Complications: no  Cardiovascular status: blood pressure returned to baseline  Respiratory status: acceptable, spontaneous ventilation and face mask

## 2022-05-05 NOTE — INTERVAL H&P NOTE
Update History & Physical    The patient's History and Physical of April 27, 22 was reviewed with the patient and I examined the patient. There was no change. The surgical site was confirmed by the patient and me. Plan: The risks, benefits, expected outcome, and alternative to the recommended procedure have been discussed with the patient. Patient understands and wants to proceed with the procedure.      Electronically signed by Ayaz Weaver DO on 5/5/2022 at 7:30 AM

## 2022-05-05 NOTE — OP NOTE
Operative Note      Patient: Laura Cooper  YOB: 1985  MRN: [id number]    Date of Procedure: 5/5/2022    Pre-Op Diagnosis: Soft tissue mass [M79.89]    Post-Op Diagnosis: Soft tissue mass/cyst.       Procedure(s):  RIGHT LATERAL THIGH LESION BIOPSY EXCISION    Surgeon(s):  Oswald Maxwell DO    Assistant:   * No surgical staff found *    Anesthesia: General    Estimated Blood Loss (mL): 20 cc    Complications: None    Specimens:   ID Type Source Tests Collected by Time Destination   1 : Right thigh mass with cyst Tissue Tissue CULTURE, TISSUE Oswald Maxwell DO 5/5/2022 0804    A : Right thigh cyst Tissue Tissue SURGICAL PATHOLOGY Oswald Maxwell,  5/5/2022 0809    B : Right thigh capsule cyst Tissue Tissue SURGICAL PATHOLOGY Oswald Maxwell, DO 5/5/2022 0810        Implants:  * No implants in log *      Drains:   Closed/Suction Drain Right Thigh Bulb (Active)   Site Description Clean, dry & intact 05/05/22 0838       Findings: Large thick capsule measuring 18 x 10 cm. Contains clear yellow fluid and 2 free-floating cysts. Largest measuring 3 x 2 x 1 cm. Smallest measuring 1 cm in diameter. Detailed Description of Procedure: Indication: Symptomatic soft tissue mass/cyst over right lateral thigh. Procedure: Patient was taken to the OR and after induction of general endotracheal anesthesia, was placed in left lateral decubitus position. Exact location of lesion was delineated with ultrasound. Patient was prepped and draped in usual sterile fashion. A incision was created over the right lateral thigh approximately 10 cm in length. Dissection carried down to the capsule of the cyst.  Capsule was very thick and scarred in place. Cyst was entered and clear yellow fluid was drained. Cultures were taken. Cyst contained 2 free-floating masses. Largest measured 3 x 2 x 1 cm. Smallest was 1 cm in diameter. These were sent for pathologic exam.  Capsule was opened up anteriorly. It measured approximately 18 cm craniocaudally by 10 cm. Using electrocautery I dissected the anterior wall of the capsule out. This was sent for pathologic exam.  The posterior wall was very scarred and I was able to remove the majority of the thickest portion of the capsule. There was a small amount of capsule wall posteriorly in the inferior and superior aspect. These were scored with electrocautery. Wound was irrigated. Hemostasis was achieved electrocautery and a small amount of Surgicel powder. Given the large cavity undermining from the incision a 15 Marcelino drain was brought in through an inferior stab incision inferiorly. This was secured to the skin with 3-0 nylon suture. Wound was then closed with interrupted 2-0 Vicryl and a running 2-0 Vicryl followed by vertical mattress 2-0 nylon sutures. A sterile dressing was placed. A drain sponge was placed around the drain. All counts correct at end of case. Patient tolerated procedure well, without complication, and end of case was transported to the recovery area in stable condition.     Electronically signed by Matt Mcintosh DO on 5/5/2022 at 9:14 AM

## 2022-05-08 ENCOUNTER — APPOINTMENT (OUTPATIENT)
Dept: CT IMAGING | Age: 37
End: 2022-05-08
Payer: MEDICARE

## 2022-05-08 ENCOUNTER — HOSPITAL ENCOUNTER (INPATIENT)
Age: 37
LOS: 1 days | Discharge: HOME HEALTH | End: 2022-05-12
Attending: EMERGENCY MEDICINE | Admitting: INTERNAL MEDICINE
Payer: MEDICARE

## 2022-05-08 DIAGNOSIS — G89.18 POSTOPERATIVE PAIN: ICD-10-CM

## 2022-05-08 DIAGNOSIS — D72.829 LEUKOCYTOSIS, UNSPECIFIED TYPE: ICD-10-CM

## 2022-05-08 DIAGNOSIS — G89.18 POST-OP PAIN: ICD-10-CM

## 2022-05-08 DIAGNOSIS — L76.82 POSTOPERATIVE COMPLICATION OF SKIN INVOLVING DRAINAGE FROM SURGICAL WOUND: Primary | ICD-10-CM

## 2022-05-08 DIAGNOSIS — S70.01XA HEMATOMA OF HIP, RIGHT, INITIAL ENCOUNTER: ICD-10-CM

## 2022-05-08 DIAGNOSIS — M79.89 SOFT TISSUE MASS: ICD-10-CM

## 2022-05-08 LAB
ANION GAP SERPL CALCULATED.3IONS-SCNC: 10 MMOL/L (ref 4–16)
APTT: 26.8 SECONDS (ref 25.1–37.1)
BANDED NEUTROPHILS ABSOLUTE COUNT: 0.3 K/CU MM
BANDED NEUTROPHILS RELATIVE PERCENT: 8 % (ref 5–11)
BUN BLDV-MCNC: 10 MG/DL (ref 6–23)
CALCIUM SERPL-MCNC: 8.8 MG/DL (ref 8.3–10.6)
CHLORIDE BLD-SCNC: 102 MMOL/L (ref 99–110)
CO2: 24 MMOL/L (ref 21–32)
CREAT SERPL-MCNC: 0.9 MG/DL (ref 0.9–1.3)
DIFFERENTIAL TYPE: ABNORMAL
ERYTHROCYTE SEDIMENTATION RATE: 21 MM/HR (ref 0–15)
GFR AFRICAN AMERICAN: >60 ML/MIN/1.73M2
GFR NON-AFRICAN AMERICAN: >60 ML/MIN/1.73M2
GLUCOSE BLD-MCNC: 93 MG/DL (ref 70–99)
HCT VFR BLD CALC: 45 % (ref 42–52)
HEMOGLOBIN: 15.5 GM/DL (ref 13.5–18)
INR BLD: 0.9 INDEX
LACTATE: 1.2 MMOL/L (ref 0.4–2)
LYMPHOCYTES ABSOLUTE: 0.5 K/CU MM
LYMPHOCYTES RELATIVE PERCENT: 14 % (ref 24–44)
MCH RBC QN AUTO: 31.1 PG (ref 27–31)
MCHC RBC AUTO-ENTMCNC: 34.4 % (ref 32–36)
MCV RBC AUTO: 90.4 FL (ref 78–100)
METAMYELOCYTES ABSOLUTE COUNT: 0.04 K/CU MM
METAMYELOCYTES PERCENT: 1 %
MONOCYTES ABSOLUTE: 0.6 K/CU MM
MONOCYTES RELATIVE PERCENT: 17 % (ref 0–4)
PDW BLD-RTO: 11.7 % (ref 11.7–14.9)
PLATELET # BLD: 290 K/CU MM (ref 140–440)
PLT MORPHOLOGY: ADEQUATE
PMV BLD AUTO: 9.3 FL (ref 7.5–11.1)
POTASSIUM SERPL-SCNC: 3.6 MMOL/L (ref 3.5–5.1)
PROTHROMBIN TIME: 11.6 SECONDS (ref 11.7–14.5)
RBC # BLD: 4.98 M/CU MM (ref 4.6–6.2)
SEGMENTED NEUTROPHILS ABSOLUTE COUNT: 2.4 K/CU MM
SEGMENTED NEUTROPHILS RELATIVE PERCENT: 60 % (ref 36–66)
SODIUM BLD-SCNC: 136 MMOL/L (ref 135–145)
WBC # BLD: 3.8 K/CU MM (ref 4–10.5)

## 2022-05-08 PROCEDURE — 2580000003 HC RX 258: Performed by: NURSE PRACTITIONER

## 2022-05-08 PROCEDURE — 96375 TX/PRO/DX INJ NEW DRUG ADDON: CPT

## 2022-05-08 PROCEDURE — 2580000003 HC RX 258: Performed by: EMERGENCY MEDICINE

## 2022-05-08 PROCEDURE — 73701 CT LOWER EXTREMITY W/DYE: CPT

## 2022-05-08 PROCEDURE — 87040 BLOOD CULTURE FOR BACTERIA: CPT

## 2022-05-08 PROCEDURE — G0378 HOSPITAL OBSERVATION PER HR: HCPCS

## 2022-05-08 PROCEDURE — 85652 RBC SED RATE AUTOMATED: CPT

## 2022-05-08 PROCEDURE — 85610 PROTHROMBIN TIME: CPT

## 2022-05-08 PROCEDURE — 6360000002 HC RX W HCPCS: Performed by: EMERGENCY MEDICINE

## 2022-05-08 PROCEDURE — 83605 ASSAY OF LACTIC ACID: CPT

## 2022-05-08 PROCEDURE — 1200000000 HC SEMI PRIVATE

## 2022-05-08 PROCEDURE — 87075 CULTR BACTERIA EXCEPT BLOOD: CPT

## 2022-05-08 PROCEDURE — 99285 EMERGENCY DEPT VISIT HI MDM: CPT

## 2022-05-08 PROCEDURE — 85027 COMPLETE CBC AUTOMATED: CPT

## 2022-05-08 PROCEDURE — 85730 THROMBOPLASTIN TIME PARTIAL: CPT

## 2022-05-08 PROCEDURE — 96365 THER/PROPH/DIAG IV INF INIT: CPT

## 2022-05-08 PROCEDURE — 2500000003 HC RX 250 WO HCPCS: Performed by: EMERGENCY MEDICINE

## 2022-05-08 PROCEDURE — 6360000004 HC RX CONTRAST MEDICATION: Performed by: EMERGENCY MEDICINE

## 2022-05-08 PROCEDURE — 85007 BL SMEAR W/DIFF WBC COUNT: CPT

## 2022-05-08 PROCEDURE — 96374 THER/PROPH/DIAG INJ IV PUSH: CPT

## 2022-05-08 PROCEDURE — 86141 C-REACTIVE PROTEIN HS: CPT

## 2022-05-08 PROCEDURE — 84145 PROCALCITONIN (PCT): CPT

## 2022-05-08 PROCEDURE — 80048 BASIC METABOLIC PNL TOTAL CA: CPT

## 2022-05-08 RX ORDER — ACETAMINOPHEN 325 MG/1
650 TABLET ORAL EVERY 6 HOURS PRN
Status: DISCONTINUED | OUTPATIENT
Start: 2022-05-08 | End: 2022-05-12 | Stop reason: HOSPADM

## 2022-05-08 RX ORDER — SODIUM CHLORIDE 9 MG/ML
INJECTION, SOLUTION INTRAVENOUS PRN
Status: DISCONTINUED | OUTPATIENT
Start: 2022-05-08 | End: 2022-05-12 | Stop reason: HOSPADM

## 2022-05-08 RX ORDER — GEMFIBROZIL 600 MG/1
600 TABLET, FILM COATED ORAL
Status: DISCONTINUED | OUTPATIENT
Start: 2022-05-09 | End: 2022-05-12 | Stop reason: HOSPADM

## 2022-05-08 RX ORDER — MORPHINE SULFATE 4 MG/ML
4 INJECTION, SOLUTION INTRAMUSCULAR; INTRAVENOUS ONCE
Status: COMPLETED | OUTPATIENT
Start: 2022-05-08 | End: 2022-05-08

## 2022-05-08 RX ORDER — CLINDAMYCIN PHOSPHATE 600 MG/50ML
600 INJECTION INTRAVENOUS EVERY 8 HOURS
Status: DISCONTINUED | OUTPATIENT
Start: 2022-05-09 | End: 2022-05-12 | Stop reason: HOSPADM

## 2022-05-08 RX ORDER — SODIUM CHLORIDE 0.9 % (FLUSH) 0.9 %
5-40 SYRINGE (ML) INJECTION PRN
Status: DISCONTINUED | OUTPATIENT
Start: 2022-05-08 | End: 2022-05-12 | Stop reason: HOSPADM

## 2022-05-08 RX ORDER — CLINDAMYCIN PHOSPHATE 900 MG/50ML
900 INJECTION INTRAVENOUS ONCE
Status: COMPLETED | OUTPATIENT
Start: 2022-05-08 | End: 2022-05-08

## 2022-05-08 RX ORDER — SODIUM CHLORIDE 9 MG/ML
INJECTION, SOLUTION INTRAVENOUS CONTINUOUS
Status: ACTIVE | OUTPATIENT
Start: 2022-05-08 | End: 2022-05-09

## 2022-05-08 RX ORDER — METHYLPHENIDATE HYDROCHLORIDE 5 MG/1
30 TABLET ORAL DAILY
Status: DISCONTINUED | OUTPATIENT
Start: 2022-05-09 | End: 2022-05-08

## 2022-05-08 RX ORDER — SODIUM CHLORIDE 9 MG/ML
1000 INJECTION, SOLUTION INTRAVENOUS CONTINUOUS
Status: DISCONTINUED | OUTPATIENT
Start: 2022-05-08 | End: 2022-05-08

## 2022-05-08 RX ORDER — ONDANSETRON 4 MG/1
4 TABLET, ORALLY DISINTEGRATING ORAL EVERY 8 HOURS PRN
Status: DISCONTINUED | OUTPATIENT
Start: 2022-05-08 | End: 2022-05-12 | Stop reason: HOSPADM

## 2022-05-08 RX ORDER — MORPHINE SULFATE 2 MG/ML
2 INJECTION, SOLUTION INTRAMUSCULAR; INTRAVENOUS
Status: COMPLETED | OUTPATIENT
Start: 2022-05-08 | End: 2022-05-09

## 2022-05-08 RX ORDER — SODIUM CHLORIDE 0.9 % (FLUSH) 0.9 %
5-40 SYRINGE (ML) INJECTION EVERY 12 HOURS SCHEDULED
Status: DISCONTINUED | OUTPATIENT
Start: 2022-05-08 | End: 2022-05-12 | Stop reason: HOSPADM

## 2022-05-08 RX ORDER — ARIPIPRAZOLE 5 MG/1
7.5 TABLET ORAL DAILY
COMMUNITY
Start: 2022-04-15

## 2022-05-08 RX ORDER — POLYETHYLENE GLYCOL 3350 17 G/17G
17 POWDER, FOR SOLUTION ORAL DAILY PRN
Status: DISCONTINUED | OUTPATIENT
Start: 2022-05-08 | End: 2022-05-12 | Stop reason: HOSPADM

## 2022-05-08 RX ORDER — TRAZODONE HYDROCHLORIDE 50 MG/1
50 TABLET ORAL NIGHTLY PRN
Status: DISCONTINUED | OUTPATIENT
Start: 2022-05-08 | End: 2022-05-12 | Stop reason: HOSPADM

## 2022-05-08 RX ORDER — ARIPIPRAZOLE 5 MG/1
7.5 TABLET ORAL DAILY
Status: DISCONTINUED | OUTPATIENT
Start: 2022-05-09 | End: 2022-05-12 | Stop reason: HOSPADM

## 2022-05-08 RX ORDER — PAROXETINE HYDROCHLORIDE 20 MG/1
40 TABLET, FILM COATED ORAL DAILY
Status: DISCONTINUED | OUTPATIENT
Start: 2022-05-09 | End: 2022-05-12 | Stop reason: HOSPADM

## 2022-05-08 RX ORDER — ONDANSETRON 2 MG/ML
4 INJECTION INTRAMUSCULAR; INTRAVENOUS EVERY 6 HOURS PRN
Status: DISCONTINUED | OUTPATIENT
Start: 2022-05-08 | End: 2022-05-12 | Stop reason: HOSPADM

## 2022-05-08 RX ORDER — ACETAMINOPHEN 650 MG/1
650 SUPPOSITORY RECTAL EVERY 6 HOURS PRN
Status: DISCONTINUED | OUTPATIENT
Start: 2022-05-08 | End: 2022-05-09

## 2022-05-08 RX ORDER — PAROXETINE HYDROCHLORIDE 40 MG/1
40 TABLET, FILM COATED ORAL DAILY
COMMUNITY
Start: 2022-04-15

## 2022-05-08 RX ORDER — SODIUM CHLORIDE 0.9 % (FLUSH) 0.9 %
10 SYRINGE (ML) INJECTION PRN
Status: DISCONTINUED | OUTPATIENT
Start: 2022-05-08 | End: 2022-05-08

## 2022-05-08 RX ADMIN — IOPAMIDOL 80 ML: 755 INJECTION, SOLUTION INTRAVENOUS at 18:37

## 2022-05-08 RX ADMIN — SODIUM CHLORIDE, PRESERVATIVE FREE 10 ML: 5 INJECTION INTRAVENOUS at 18:37

## 2022-05-08 RX ADMIN — SODIUM CHLORIDE, PRESERVATIVE FREE 10 ML: 5 INJECTION INTRAVENOUS at 23:13

## 2022-05-08 RX ADMIN — CLINDAMYCIN IN 5 PERCENT DEXTROSE 900 MG: 18 INJECTION, SOLUTION INTRAVENOUS at 21:19

## 2022-05-08 RX ADMIN — SODIUM CHLORIDE 1000 ML: 9 INJECTION, SOLUTION INTRAVENOUS at 16:03

## 2022-05-08 RX ADMIN — SODIUM CHLORIDE: 9 INJECTION, SOLUTION INTRAVENOUS at 23:12

## 2022-05-08 RX ADMIN — MORPHINE SULFATE 4 MG: 4 INJECTION, SOLUTION INTRAMUSCULAR; INTRAVENOUS at 20:28

## 2022-05-08 ASSESSMENT — PAIN SCALES - GENERAL
PAINLEVEL_OUTOF10: 4
PAINLEVEL_OUTOF10: 9

## 2022-05-08 ASSESSMENT — PAIN DESCRIPTION - ONSET: ONSET: ON-GOING

## 2022-05-08 ASSESSMENT — ENCOUNTER SYMPTOMS
VOMITING: 0
NAUSEA: 0
COUGH: 0
ABDOMINAL PAIN: 0
SHORTNESS OF BREATH: 0

## 2022-05-08 ASSESSMENT — PAIN DESCRIPTION - ORIENTATION: ORIENTATION: RIGHT

## 2022-05-08 ASSESSMENT — PAIN DESCRIPTION - LOCATION: LOCATION: BACK;LEG

## 2022-05-08 ASSESSMENT — PAIN DESCRIPTION - PAIN TYPE: TYPE: ACUTE PAIN;CHRONIC PAIN

## 2022-05-08 ASSESSMENT — PAIN DESCRIPTION - FREQUENCY: FREQUENCY: CONTINUOUS

## 2022-05-08 ASSESSMENT — PAIN DESCRIPTION - DESCRIPTORS: DESCRIPTORS: ACHING

## 2022-05-08 NOTE — ED PROVIDER NOTES
Emergency Department Encounter    Patient: Shanae Romero  MRN: [id number]  : 1985  Date of Evaluation: 2022  ED Provider:  Britney Baca MD      Triage Chief Complaint:   Leg Pain (right thigh pain post surgery)      [place]:  Shanae Romero is a 39 y.o. male that presents to the emergency department with right thigh pain and drainage. Patient reports that on Thursday he had surgery with Dr. Bonita Sylvester at this facility to remove a large hematoma that had been on the thigh for multiple months. Original injury was from a dog bite. Patient reports that he had a cyst removed and had a drain placed at the end of the surgery. Since the surgery, he has had only minimal drainage into the suction bulb. He had called the surgeon's office and was instructed to try milking the tubing, but this has not helped. Instead, the only drainage has been around the ostomy site the tubing goes through. Every time the patient stands up but also when he moves, there will be bleeding down his leg. He has been saturating multiple dressings per day including a dressing placed about 2 hours prior to presentation. The area remains very sore to touch. Pain does radiate down the leg somewhat. Patient reports a fever of 103 prior to arrival but took some acetaminophen. He denies any chills. He denies any nausea or vomiting. He denies any numbness or tingling. Patient denies history of problematic skin infections. Patient reports no other particular provocative or alleviating factors. ROS - see HPI, below listed is current ROS at time of my eval:  CONSTITUTIONAL: No chills, or sweats. EYES: No eye complaints. HENT: No upper respiratory complaints. RESPIRATORY: No shortness of breath, cough, or sputum production. CARDIOVASCULAR: No anginal-type chest pain, orthopnea, or edema. GASTROINTESTINAL: No nausea, vomiting, or abdominal pain. GENITOURINARY: No frequency, urgency, or dysuria. MUSCULOSKELETAL: As above. Otherwise, no neck, back, or extremity pain. NEUROLOGICAL: No focal weakness, numbness, or tingling. SKIN: No rashes or other lesions reported. No yellowing of the skin.     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     L5, S1    Depression     Hyperlipidemia     Neuropathy 2020    Seizures (HCC)      Past Surgical History:   Procedure Laterality Date    EYE SURGERY      for amblyopia    SKIN BIOPSY Right 5/5/2022    RIGHT LATERAL THIGH LESION BIOPSY EXCISION performed by Corazon Chand DO at [place] [address]      TYMPANOSTOMY TUBE PLACEMENT       Family History   Problem Relation Age of Onset    Ovarian Cancer Mother     Bipolar Disorder Mother     Lung Cancer Father     Other Brother         cerebral palsy    Diabetes Maternal Grandmother     Diabetes Paternal Grandmother      Social History     Socioeconomic History    Marital status: Legally      Spouse name: Not on file    Number of children: 3    Years of education: Not on file    Highest education level: Not on file   Occupational History    Occupation: Landscaping   Tobacco Use    Smoking status: Former Smoker    Smokeless tobacco: Never Used   Vaping Use    Vaping Use: Never used   Substance and Sexual Activity    Alcohol use: Not Currently     Comment: occasional    Drug use: Not Currently     Types: Marijuana (Weed)     Comment: in the past- marijuana Many years ago    Sexual activity: Yes     Partners: Female   Other Topics Concern    Not on file   Social History Narrative    Not on file     Social Determinants of Health     Financial Resource Strain: Low Risk     Difficulty of Paying Living Expenses: Not hard at all   Food Insecurity: No Food Insecurity    Worried About [address] in the Last Year: Never true    Ran Out of Food in the Last Year: Never true   Transportation Needs:     Lack of Transportation (Medical): Not on file    Lack of Transportation (Non-Medical):  Not on file   Physical Activity:     Days of Exercise per Week: Not on file    Minutes of Exercise per Session: Not on file   Stress:     Feeling of Stress : Not on file   Social Connections:     Frequency of Communication with Friends and Family: Not on file    Frequency of Social Gatherings with Friends and Family: Not on file    Attends Judaism Services: Not on file    Active Member of [address] [place] [address] or Organizations: Not on file    Attends Club or Organization Meetings: Not on file    Marital Status: Not on file   Intimate Partner Violence:     Fear of Current or Ex-Partner: Not on file    Emotionally Abused: Not on file    Physically Abused: Not on file    Sexually Abused: Not on file   Housing Stability:     Unable to Pay for Housing in the Last Year: Not on file    Number of [place] in the Last Year: Not on file    Unstable Housing in the Last Year: Not on file     Current Facility-Administered Medications   Medication Dose Route Frequency Provider Last Rate Last Admin    HYDROcodone-acetaminophen ([facility]) 5-325 MG per tablet 1 tablet  1 tablet Oral Q4H PRN Oswald Maxwell, DO   1 tablet at 05/11/22 0407    sodium chloride flush 0.9 % injection 5-40 mL  5-40 mL IntraVENous 2 times per day Oswald Maxwell, DO   10 mL at 05/10/22 2120    sodium chloride flush 0.9 % injection 5-40 mL  5-40 mL IntraVENous PRN Oswald Maxwell, DO        0.9 % sodium chloride infusion   IntraVENous PRN Oswald Maxwell,  mL/hr at 05/11/22 0608 25 mL at 05/11/22 0608    ondansetron (ZOFRAN-ODT) disintegrating tablet 4 mg  4 mg Oral Q8H PRN Oswald Ruckerbach, DO   4 mg at 05/10/22 2122    Or    ondansetron (ZOFRAN) injection 4 mg  4 mg IntraVENous Q6H PRN Oswald Ruckerbach, DO   4 mg at 05/10/22 0209    polyethylene glycol (GLYCOLAX) packet 17 g  17 g Oral Daily PRN Selma Alessandra, DO        acetaminophen (TYLENOL) tablet 650 mg  650 mg Oral Q6H PRN Selma Alessandra, DO   650 mg at 05/09/22 0231    clindamycin (CLEOCIN) 600 mg in dextrose 5 % 50 mL IVPB  600 mg IntraVENous Q8H Selma Alessandra,  mL/hr at 05/11/22 0609 600 mg at 05/11/22 0609    ARIPiprazole (ABILIFY) tablet 7.5 mg  7.5 mg Oral Daily Selma Alessandra, DO   7.5 mg at 05/10/22 2282    gemfibrozil (LOPID) tablet 600 mg  600 mg Oral BID AC Philip De La Torre, DO   600 mg at 05/11/22 9482    PARoxetine (PAXIL) tablet 40 mg  40 mg Oral Daily Selma Alessandra, DO   40 mg at 05/10/22 9774    traZODone (DESYREL) tablet 50 mg  50 mg Oral Nightly PRN Selma Alessandra, DO   50 mg at 05/10/22 2119     Allergies   Allergen Reactions    Amoxicillin Diarrhea       Nursing Notes Reviewed    Physical Exam:  Triage VS:    ED Triage Vitals [05/08/22 1525]   Enc Vitals Group      BP (!) 129/105      Pulse 110      Resp 16      Temp 98.5 °F (36.9 °C)      Temp Source Oral      SpO2 97 %      Weight 225 lb (102.1 kg)      Height 6' 2\" (1.88 m)      Head Circumference       Peak Flow       Pain Score       Pain Loc       Pain Edu? Excl. in [address]? My pulse ox interpretation is -97% at triage    GENERAL: Patient is awake, alert, and oriented appropriately. Patient is resting comfortably in a still position on the exam table. Patient speaking in full and complete sentences. Well-nourished and well-developed. HEENT: Normocephalic and atraumatic. Pupils equal, round, and reactive to light. No redness or matting. Bilateral external ears are unremarkable. Nasal mucosa is pink without purulence. Oral mucosa is moist and pink. NECK: Supple with normal range of motion. No Kernig's or Brudzinski sign. No JVD. RESPIRATORY: Symmetric aeration. No respiratory distress. No wheeze, stridor, rales, rhonchi. Chest wall stable and nontender. CARDIOVASCULAR: Regular tachycardia, mild.   No murmurs, rubs, or gallops. No central or peripheral cyanosis. GASTROINTESTINAL: Soft, nontender, and nondistended. NEUROLOGICAL: Awake, alert and oriented x 3. GCS 15. Cranial nerves III through XII are grossly intact as tested without facial droop or dermatomal paresthesias. Of note, forehead wrinkles are symmetric and intact. Conjugate gaze without entrapment. No asymmetry of the corners of the mouth or nasolabial folds. No gross motor or cerebellar deficit. BACK/MUSCULOSKELETAL: Focal examination of the right lower extremity reveals an approximate 10 cm sutured incision on the anterior lateral thigh. There is some tissue reaction but no romeo cellulitis. No dehiscence. There is generalized soft tissue swelling and tenderness without crepitus. Drain is noted in a stab incision inferior to the surgical incision. There was overlying dressing saturated with blood, but there is no active bleeding at this time. No purulence. Compartments appear soft otherwise. No bony tenderness. Brisk capillary fill distally. SKIN: Normal tone for ethnicity. Normal turgor and brisk capillary refill peripherally. No petechiae, purpura, vesicles, bullae, or other lesions. No icterus. PSYCHIATRIC: Normal mood. Normal affect. Emergency department course. Patient is brought to bed 17 and assessed and reassessed by me. After initial evaluation, orders are placed for CBC, metabolic panel, and lactate. CT scan to assess for deep tissue changes as ordered. Patient has no tenderness along the medial, vascular compartment, so emergent ultrasound for DVT does not appear specifically indicated. Normal saline 1 L bolus is ordered. Patient is agreeable to continuing plan. Patient has been assessed and reassessed on multiple occasions. He remains generally stable. Laboratory testing is fairly reassuring. CT scan, however, shows either persistent or reaccumulation of a fairly significant amount of fluid.   Evaluation is MANUAL DIFFERENTIAL     PLT Morphology ADEQUATE    Basic Metabolic Panel w/ Reflex to MG   Result Value Ref Range    Sodium 136 135 - 145 MMOL/L    Potassium 3.6 3.5 - 5.1 MMOL/L    Chloride 102 99 - 110 mMol/L    CO2 24 21 - 32 MMOL/L    Anion Gap 10 4 - 16    BUN 10 6 - 23 MG/DL    CREATININE 0.9 0.9 - 1.3 MG/DL    Glucose 93 70 - 99 MG/DL    Calcium 8.8 8.3 - 10.6 MG/DL    GFR Non-African American >60 >60 mL/min/1.73m2    GFR African American >60 >60 mL/min/1.73m2   Protime-INR   Result Value Ref Range    Protime 11.6 (L) 11.7 - 14.5 SECONDS    INR 0.90 INDEX   APTT   Result Value Ref Range    aPTT 26.8 25.1 - 37.1 SECONDS   Lactic Acid   Result Value Ref Range    Lactate 1.2 0.4 - 2.0 mMOL/L   Basic Metabolic Panel w/ Reflex to MG   Result Value Ref Range    Sodium 138 135 - 145 MMOL/L    Potassium 4.4 3.5 - 5.1 MMOL/L    Chloride 104 99 - 110 mMol/L    CO2 23 21 - 32 MMOL/L    Anion Gap 11 4 - 16    BUN 16 6 - 23 MG/DL    CREATININE 1.0 0.9 - 1.3 MG/DL    Glucose 111 (H) 70 - 99 MG/DL    Calcium 8.5 8.3 - 10.6 MG/DL    GFR Non-African American >60 >60 mL/min/1.73m2    GFR African American >60 >60 mL/min/1.73m2   CBC with Auto Differential   Result Value Ref Range    WBC 2.8 (L) 4.0 - 10.5 K/CU MM    RBC 4.64 4.6 - 6.2 M/CU MM    Hemoglobin 14.5 13.5 - 18.0 GM/DL    Hematocrit 43.6 42 - 52 %    MCV 94.0 78 - 100 FL    MCH 31.3 (H) 27 - 31 PG    MCHC 33.3 32.0 - 36.0 %    RDW 11.8 11.7 - 14.9 %    Platelets 816 847 - 694 K/CU MM    MPV 9.4 7.5 - 11.1 FL    Differential Type AUTOMATED DIFFERENTIAL     Segs Relative 60.7 36 - 66 %    Lymphocytes % 17.7 (L) 24 - 44 %    Monocytes % 20.5 (H) 0 - 4 %    Eosinophils % 0.0 0 - 3 %    Basophils % 0.7 0 - 1 %    Segs Absolute 1.7 K/CU MM    Lymphocytes Absolute 0.5 K/CU MM    Monocytes Absolute 0.6 K/CU MM    Eosinophils Absolute 0.0 K/CU MM    Basophils Absolute 0.0 K/CU MM    Nucleated RBC % 0.0 %    Total Nucleated RBC 0.0 K/CU MM    Total Immature Neutrophil 0.01 K/CU MM    Immature Neutrophil % 0.4 0 - 0.43 %   Sedimentation Rate   Result Value Ref Range    Sed Rate 21 (H) 0 - 15 MM/HR   C-Reactive Protein   Result Value Ref Range    CRP, High Sensitivity 30.9 mg/L   Procalcitonin   Result Value Ref Range    Procalcitonin 6.300    Basic Metabolic Panel w/ Reflex to MG   Result Value Ref Range    Sodium 138 135 - 145 MMOL/L    Potassium 3.9 3.5 - 5.1 MMOL/L    Chloride 103 99 - 110 mMol/L    CO2 24 21 - 32 MMOL/L    Anion Gap 11 4 - 16    BUN 19 6 - 23 MG/DL    CREATININE 1.0 0.9 - 1.3 MG/DL    Glucose 100 (H) 70 - 99 MG/DL    Calcium 8.4 8.3 - 10.6 MG/DL    GFR Non-African American >60 >60 mL/min/1.73m2    GFR African American >60 >60 mL/min/1.73m2   CBC with Auto Differential   Result Value Ref Range    WBC 2.6 (L) 4.0 - 10.5 K/CU MM    RBC 4.43 (L) 4.6 - 6.2 M/CU MM    Hemoglobin 13.9 13.5 - 18.0 GM/DL    Hematocrit 41.3 (L) 42 - 52 %    MCV 93.2 78 - 100 FL    MCH 31.4 (H) 27 - 31 PG    MCHC 33.7 32.0 - 36.0 %    RDW 11.9 11.7 - 14.9 %    Platelets 665 873 - 388 K/CU MM    MPV 9.4 7.5 - 11.1 FL    Differential Type AUTOMATED DIFFERENTIAL     Segs Relative 39.3 36 - 66 %    Lymphocytes % 42.5 24 - 44 %    Monocytes % 17.0 (H) 0 - 4 %    Eosinophils % 0.4 0 - 3 %    Basophils % 0.4 0 - 1 %    Segs Absolute 1.0 K/CU MM    Lymphocytes Absolute 1.1 K/CU MM    Monocytes Absolute 0.4 K/CU MM    Eosinophils Absolute 0.0 K/CU MM    Basophils Absolute 0.0 K/CU MM    Nucleated RBC % 0.0 %    Total Nucleated RBC 0.0 K/CU MM    Total Immature Neutrophil 0.01 K/CU MM    Immature Neutrophil % 0.4 0 - 0.43 %        Radiographs (if obtained):  Radiologist's Report Reviewed:        CT FEMUR RIGHT W CONTRAST (Final result)  Result time 05/08/22 19:01:34  Procedure changed from [address] [place] [address]  Final result by Isidro Malcolm MD (05/08/22 19:01:34)                Impression:    1.  Large complex collection centered along the lateral soft tissues of the   right thigh with surgical drain in today's exam.     Joint: Anatomic alignment of the imaged right hip and right knee.  No   significant knee effusion.                     Medical decision making:  Patient presents to the emergency department with continued drainage, mostly bleeding, and postoperative pain. Patient reports fever prior to arrival.  He was afebrile upon arrival, but he does report taking some acetaminophen. There has been no evidence of shock or hypoperfusion. He does have leukocytosis. Consider SIRS and sepsis, but he has not required any pressor support. CT scan shows potentially significant accumulation of fluid. There is gas within the wound, though this may be postoperative, as well. After consultation with general surgery, patient will be placed in observation for continuing empiric antibiotics and careful reevaluation. As of this evaluation, evaluation appears lower risk for fasciitis, romeo abscess, osteomyelitis, DVT, arterial occlusion, among other considerations. Procedures: None. Consultations: General surgery. Hospitalist service. Clinical Impression:  1. Postoperative complication of skin involving drainage from surgical wound    2. Postoperative pain    3. Leukocytosis, unspecified type      Disposition referral (if applicable):  Pending    Disposition medications (if applicable):  Current Discharge Medication List        ED Provider Disposition Time  DISPOSITION Admitted 05/08/2022 08:54:50 PM      Comment: Please note this report has been produced using speech recognition software and may contain errors related to that system including errors in grammar, punctuation, and spelling, as well as words and phrases that may be inappropriate. Efforts were made to edit the dictations.         Cheryl Singh MD  05/11/22 2858

## 2022-05-08 NOTE — ED NOTES
Unable to update vitals d/t patient being out of room for testing.         Jenny Ellington RN  05/08/22 4115

## 2022-05-08 NOTE — ED TRIAGE NOTES
Pt arrives via EMS for right thigh pain. States that he had surgery on Thursday 5/5 to have \"mass removed from hematoma from dog bite last July\". Arrives in dressing and has ari drain to leg. Afebrile at this time.

## 2022-05-09 ENCOUNTER — ANESTHESIA (OUTPATIENT)
Dept: OPERATING ROOM | Age: 37
End: 2022-05-09
Payer: MEDICARE

## 2022-05-09 ENCOUNTER — ANESTHESIA EVENT (OUTPATIENT)
Dept: OPERATING ROOM | Age: 37
End: 2022-05-09
Payer: MEDICARE

## 2022-05-09 VITALS
DIASTOLIC BLOOD PRESSURE: 63 MMHG | TEMPERATURE: 98.6 F | SYSTOLIC BLOOD PRESSURE: 103 MMHG | OXYGEN SATURATION: 95 % | RESPIRATION RATE: 17 BRPM

## 2022-05-09 DIAGNOSIS — L76.82 POSTOPERATIVE COMPLICATION OF SKIN INVOLVING DRAINAGE FROM SURGICAL WOUND: Primary | ICD-10-CM

## 2022-05-09 LAB
ANION GAP SERPL CALCULATED.3IONS-SCNC: 11 MMOL/L (ref 4–16)
BASOPHILS ABSOLUTE: 0 K/CU MM
BASOPHILS RELATIVE PERCENT: 0.7 % (ref 0–1)
BUN BLDV-MCNC: 16 MG/DL (ref 6–23)
CALCIUM SERPL-MCNC: 8.5 MG/DL (ref 8.3–10.6)
CHLORIDE BLD-SCNC: 104 MMOL/L (ref 99–110)
CO2: 23 MMOL/L (ref 21–32)
CREAT SERPL-MCNC: 1 MG/DL (ref 0.9–1.3)
DIFFERENTIAL TYPE: ABNORMAL
EOSINOPHILS ABSOLUTE: 0 K/CU MM
EOSINOPHILS RELATIVE PERCENT: 0 % (ref 0–3)
GFR AFRICAN AMERICAN: >60 ML/MIN/1.73M2
GFR NON-AFRICAN AMERICAN: >60 ML/MIN/1.73M2
GLUCOSE BLD-MCNC: 111 MG/DL (ref 70–99)
HCT VFR BLD CALC: 43.6 % (ref 42–52)
HEMOGLOBIN: 14.5 GM/DL (ref 13.5–18)
HIGH SENSITIVE C-REACTIVE PROTEIN: 30.9 MG/L
IMMATURE NEUTROPHIL %: 0.4 % (ref 0–0.43)
LYMPHOCYTES ABSOLUTE: 0.5 K/CU MM
LYMPHOCYTES RELATIVE PERCENT: 17.7 % (ref 24–44)
MCH RBC QN AUTO: 31.3 PG (ref 27–31)
MCHC RBC AUTO-ENTMCNC: 33.3 % (ref 32–36)
MCV RBC AUTO: 94 FL (ref 78–100)
MONOCYTES ABSOLUTE: 0.6 K/CU MM
MONOCYTES RELATIVE PERCENT: 20.5 % (ref 0–4)
NUCLEATED RBC %: 0 %
PDW BLD-RTO: 11.8 % (ref 11.7–14.9)
PLATELET # BLD: 290 K/CU MM (ref 140–440)
PMV BLD AUTO: 9.4 FL (ref 7.5–11.1)
POTASSIUM SERPL-SCNC: 4.4 MMOL/L (ref 3.5–5.1)
PROCALCITONIN: 0.11
RBC # BLD: 4.64 M/CU MM (ref 4.6–6.2)
SEGMENTED NEUTROPHILS ABSOLUTE COUNT: 1.7 K/CU MM
SEGMENTED NEUTROPHILS RELATIVE PERCENT: 60.7 % (ref 36–66)
SODIUM BLD-SCNC: 138 MMOL/L (ref 135–145)
TOTAL IMMATURE NEUTOROPHIL: 0.01 K/CU MM
TOTAL NUCLEATED RBC: 0 K/CU MM
WBC # BLD: 2.8 K/CU MM (ref 4–10.5)

## 2022-05-09 PROCEDURE — 6360000002 HC RX W HCPCS: Performed by: NURSE ANESTHETIST, CERTIFIED REGISTERED

## 2022-05-09 PROCEDURE — 87070 CULTURE OTHR SPECIMN AEROBIC: CPT

## 2022-05-09 PROCEDURE — 0K9Q0ZZ DRAINAGE OF RIGHT UPPER LEG MUSCLE, OPEN APPROACH: ICD-10-PCS | Performed by: SURGERY

## 2022-05-09 PROCEDURE — 6360000002 HC RX W HCPCS: Performed by: SURGERY

## 2022-05-09 PROCEDURE — 3700000000 HC ANESTHESIA ATTENDED CARE: Performed by: SURGERY

## 2022-05-09 PROCEDURE — G0378 HOSPITAL OBSERVATION PER HR: HCPCS

## 2022-05-09 PROCEDURE — 7100000000 HC PACU RECOVERY - FIRST 15 MIN: Performed by: SURGERY

## 2022-05-09 PROCEDURE — 6360000002 HC RX W HCPCS: Performed by: NURSE PRACTITIONER

## 2022-05-09 PROCEDURE — 3700000001 HC ADD 15 MINUTES (ANESTHESIA): Performed by: SURGERY

## 2022-05-09 PROCEDURE — 6370000000 HC RX 637 (ALT 250 FOR IP): Performed by: NURSE PRACTITIONER

## 2022-05-09 PROCEDURE — 87205 SMEAR GRAM STAIN: CPT

## 2022-05-09 PROCEDURE — 3600000002 HC SURGERY LEVEL 2 BASE: Performed by: SURGERY

## 2022-05-09 PROCEDURE — 94761 N-INVAS EAR/PLS OXIMETRY MLT: CPT

## 2022-05-09 PROCEDURE — 99211 OFF/OP EST MAY X REQ PHY/QHP: CPT

## 2022-05-09 PROCEDURE — 2709999900 HC NON-CHARGEABLE SUPPLY: Performed by: SURGERY

## 2022-05-09 PROCEDURE — 2580000003 HC RX 258: Performed by: NURSE PRACTITIONER

## 2022-05-09 PROCEDURE — 96376 TX/PRO/DX INJ SAME DRUG ADON: CPT

## 2022-05-09 PROCEDURE — 99024 POSTOP FOLLOW-UP VISIT: CPT | Performed by: SURGERY

## 2022-05-09 PROCEDURE — 2580000003 HC RX 258: Performed by: SURGERY

## 2022-05-09 PROCEDURE — 87075 CULTR BACTERIA EXCEPT BLOOD: CPT

## 2022-05-09 PROCEDURE — 7100000001 HC PACU RECOVERY - ADDTL 15 MIN: Performed by: SURGERY

## 2022-05-09 PROCEDURE — 96366 THER/PROPH/DIAG IV INF ADDON: CPT

## 2022-05-09 PROCEDURE — 6360000002 HC RX W HCPCS: Performed by: ANESTHESIOLOGY

## 2022-05-09 PROCEDURE — 3600000012 HC SURGERY LEVEL 2 ADDTL 15MIN: Performed by: SURGERY

## 2022-05-09 PROCEDURE — 6370000000 HC RX 637 (ALT 250 FOR IP): Performed by: SURGERY

## 2022-05-09 PROCEDURE — 2500000003 HC RX 250 WO HCPCS: Performed by: NURSE ANESTHETIST, CERTIFIED REGISTERED

## 2022-05-09 PROCEDURE — 36415 COLL VENOUS BLD VENIPUNCTURE: CPT

## 2022-05-09 PROCEDURE — 85025 COMPLETE CBC W/AUTO DIFF WBC: CPT

## 2022-05-09 PROCEDURE — 80048 BASIC METABOLIC PNL TOTAL CA: CPT

## 2022-05-09 PROCEDURE — 2500000003 HC RX 250 WO HCPCS: Performed by: SURGERY

## 2022-05-09 PROCEDURE — 2500000003 HC RX 250 WO HCPCS: Performed by: NURSE PRACTITIONER

## 2022-05-09 RX ORDER — LIDOCAINE HYDROCHLORIDE 20 MG/ML
INJECTION, SOLUTION EPIDURAL; INFILTRATION; INTRACAUDAL; PERINEURAL PRN
Status: DISCONTINUED | OUTPATIENT
Start: 2022-05-09 | End: 2022-05-09 | Stop reason: SDUPTHER

## 2022-05-09 RX ORDER — ONDANSETRON 2 MG/ML
4 INJECTION INTRAMUSCULAR; INTRAVENOUS
Status: DISCONTINUED | OUTPATIENT
Start: 2022-05-09 | End: 2022-05-09 | Stop reason: HOSPADM

## 2022-05-09 RX ORDER — OXYCODONE HYDROCHLORIDE 5 MG/1
10 TABLET ORAL PRN
Status: DISCONTINUED | OUTPATIENT
Start: 2022-05-09 | End: 2022-05-09 | Stop reason: HOSPADM

## 2022-05-09 RX ORDER — OXYCODONE HYDROCHLORIDE 5 MG/1
5 TABLET ORAL PRN
Status: DISCONTINUED | OUTPATIENT
Start: 2022-05-09 | End: 2022-05-09 | Stop reason: HOSPADM

## 2022-05-09 RX ORDER — DEXAMETHASONE SODIUM PHOSPHATE 4 MG/ML
INJECTION, SOLUTION INTRA-ARTICULAR; INTRALESIONAL; INTRAMUSCULAR; INTRAVENOUS; SOFT TISSUE PRN
Status: DISCONTINUED | OUTPATIENT
Start: 2022-05-09 | End: 2022-05-09 | Stop reason: SDUPTHER

## 2022-05-09 RX ORDER — SODIUM CHLORIDE 9 MG/ML
25 INJECTION, SOLUTION INTRAVENOUS PRN
Status: DISCONTINUED | OUTPATIENT
Start: 2022-05-09 | End: 2022-05-09 | Stop reason: HOSPADM

## 2022-05-09 RX ORDER — SODIUM CHLORIDE 0.9 % (FLUSH) 0.9 %
5-40 SYRINGE (ML) INJECTION PRN
Status: DISCONTINUED | OUTPATIENT
Start: 2022-05-09 | End: 2022-05-09 | Stop reason: HOSPADM

## 2022-05-09 RX ORDER — SODIUM CHLORIDE 0.9 % (FLUSH) 0.9 %
5-40 SYRINGE (ML) INJECTION EVERY 12 HOURS SCHEDULED
Status: DISCONTINUED | OUTPATIENT
Start: 2022-05-09 | End: 2022-05-09 | Stop reason: HOSPADM

## 2022-05-09 RX ORDER — FENTANYL CITRATE 50 UG/ML
25 INJECTION, SOLUTION INTRAMUSCULAR; INTRAVENOUS EVERY 5 MIN PRN
Status: DISCONTINUED | OUTPATIENT
Start: 2022-05-09 | End: 2022-05-09 | Stop reason: HOSPADM

## 2022-05-09 RX ORDER — PROPOFOL 10 MG/ML
INJECTION, EMULSION INTRAVENOUS PRN
Status: DISCONTINUED | OUTPATIENT
Start: 2022-05-09 | End: 2022-05-09 | Stop reason: SDUPTHER

## 2022-05-09 RX ORDER — FENTANYL CITRATE 50 UG/ML
INJECTION, SOLUTION INTRAMUSCULAR; INTRAVENOUS PRN
Status: DISCONTINUED | OUTPATIENT
Start: 2022-05-09 | End: 2022-05-09 | Stop reason: SDUPTHER

## 2022-05-09 RX ORDER — HYDRALAZINE HYDROCHLORIDE 20 MG/ML
10 INJECTION INTRAMUSCULAR; INTRAVENOUS
Status: DISCONTINUED | OUTPATIENT
Start: 2022-05-09 | End: 2022-05-09 | Stop reason: HOSPADM

## 2022-05-09 RX ORDER — FENTANYL CITRATE 50 UG/ML
50 INJECTION, SOLUTION INTRAMUSCULAR; INTRAVENOUS EVERY 5 MIN PRN
Status: DISCONTINUED | OUTPATIENT
Start: 2022-05-09 | End: 2022-05-09 | Stop reason: HOSPADM

## 2022-05-09 RX ORDER — CEFAZOLIN SODIUM 2 G/100ML
2000 INJECTION, SOLUTION INTRAVENOUS EVERY 8 HOURS
Status: DISCONTINUED | OUTPATIENT
Start: 2022-05-09 | End: 2022-05-09 | Stop reason: HOSPADM

## 2022-05-09 RX ORDER — ONDANSETRON 2 MG/ML
INJECTION INTRAMUSCULAR; INTRAVENOUS PRN
Status: DISCONTINUED | OUTPATIENT
Start: 2022-05-09 | End: 2022-05-09 | Stop reason: SDUPTHER

## 2022-05-09 RX ORDER — KETOROLAC TROMETHAMINE 30 MG/ML
INJECTION, SOLUTION INTRAMUSCULAR; INTRAVENOUS PRN
Status: DISCONTINUED | OUTPATIENT
Start: 2022-05-09 | End: 2022-05-09 | Stop reason: SDUPTHER

## 2022-05-09 RX ORDER — HYDROCODONE BITARTRATE AND ACETAMINOPHEN 5; 325 MG/1; MG/1
1 TABLET ORAL EVERY 4 HOURS PRN
Status: DISCONTINUED | OUTPATIENT
Start: 2022-05-09 | End: 2022-05-12 | Stop reason: HOSPADM

## 2022-05-09 RX ORDER — LABETALOL HYDROCHLORIDE 5 MG/ML
10 INJECTION, SOLUTION INTRAVENOUS
Status: DISCONTINUED | OUTPATIENT
Start: 2022-05-09 | End: 2022-05-09 | Stop reason: HOSPADM

## 2022-05-09 RX ADMIN — SODIUM CHLORIDE: 9 INJECTION, SOLUTION INTRAVENOUS at 10:17

## 2022-05-09 RX ADMIN — MORPHINE SULFATE 2 MG: 2 INJECTION, SOLUTION INTRAMUSCULAR; INTRAVENOUS at 18:07

## 2022-05-09 RX ADMIN — CLINDAMYCIN IN 5 PERCENT DEXTROSE 600 MG: 12 INJECTION, SOLUTION INTRAVENOUS at 07:06

## 2022-05-09 RX ADMIN — MORPHINE SULFATE 2 MG: 2 INJECTION, SOLUTION INTRAMUSCULAR; INTRAVENOUS at 07:31

## 2022-05-09 RX ADMIN — KETOROLAC TROMETHAMINE 30 MG: 30 INJECTION, SOLUTION INTRAMUSCULAR; INTRAVENOUS at 11:02

## 2022-05-09 RX ADMIN — ONDANSETRON 4 MG: 2 INJECTION INTRAMUSCULAR; INTRAVENOUS at 18:06

## 2022-05-09 RX ADMIN — SODIUM CHLORIDE, PRESERVATIVE FREE 10 ML: 5 INJECTION INTRAVENOUS at 14:29

## 2022-05-09 RX ADMIN — SODIUM CHLORIDE: 9 INJECTION, SOLUTION INTRAVENOUS at 22:01

## 2022-05-09 RX ADMIN — MORPHINE SULFATE 2 MG: 2 INJECTION, SOLUTION INTRAMUSCULAR; INTRAVENOUS at 12:55

## 2022-05-09 RX ADMIN — SODIUM CHLORIDE, PRESERVATIVE FREE 10 ML: 5 INJECTION INTRAVENOUS at 22:02

## 2022-05-09 RX ADMIN — MORPHINE SULFATE 2 MG: 2 INJECTION, SOLUTION INTRAMUSCULAR; INTRAVENOUS at 02:30

## 2022-05-09 RX ADMIN — PAROXETINE HYDROCHLORIDE 40 MG: 20 TABLET, FILM COATED ORAL at 14:27

## 2022-05-09 RX ADMIN — CLINDAMYCIN IN 5 PERCENT DEXTROSE 600 MG: 12 INJECTION, SOLUTION INTRAVENOUS at 14:27

## 2022-05-09 RX ADMIN — ONDANSETRON 4 MG: 2 INJECTION INTRAMUSCULAR; INTRAVENOUS at 10:32

## 2022-05-09 RX ADMIN — PROPOFOL 200 MG: 10 INJECTION, EMULSION INTRAVENOUS at 10:32

## 2022-05-09 RX ADMIN — HYDROCODONE BITARTRATE AND ACETAMINOPHEN 1 TABLET: 5; 325 TABLET ORAL at 22:10

## 2022-05-09 RX ADMIN — LIDOCAINE HYDROCHLORIDE 100 MG: 20 INJECTION, SOLUTION EPIDURAL; INFILTRATION; INTRACAUDAL at 10:32

## 2022-05-09 RX ADMIN — FENTANYL CITRATE 50 MCG: 50 INJECTION, SOLUTION INTRAMUSCULAR; INTRAVENOUS at 11:49

## 2022-05-09 RX ADMIN — FENTANYL CITRATE 100 MCG: 50 INJECTION, SOLUTION INTRAMUSCULAR; INTRAVENOUS at 10:32

## 2022-05-09 RX ADMIN — GEMFIBROZIL 600 MG: 600 TABLET, FILM COATED ORAL at 07:06

## 2022-05-09 RX ADMIN — CLINDAMYCIN IN 5 PERCENT DEXTROSE 600 MG: 12 INJECTION, SOLUTION INTRAVENOUS at 22:03

## 2022-05-09 RX ADMIN — CEFAZOLIN SODIUM 2000 MG: 2 INJECTION, SOLUTION INTRAVENOUS at 10:24

## 2022-05-09 RX ADMIN — GEMFIBROZIL 600 MG: 600 TABLET, FILM COATED ORAL at 16:09

## 2022-05-09 RX ADMIN — DEXAMETHASONE SODIUM PHOSPHATE 4 MG: 4 INJECTION, SOLUTION INTRAMUSCULAR; INTRAVENOUS at 10:32

## 2022-05-09 RX ADMIN — ACETAMINOPHEN 650 MG: 325 TABLET ORAL at 02:31

## 2022-05-09 RX ADMIN — ARIPIPRAZOLE 7.5 MG: 5 TABLET ORAL at 14:28

## 2022-05-09 ASSESSMENT — PULMONARY FUNCTION TESTS
PIF_VALUE: 2
PIF_VALUE: 1
PIF_VALUE: 3
PIF_VALUE: 3
PIF_VALUE: 2
PIF_VALUE: 3
PIF_VALUE: 4
PIF_VALUE: 4
PIF_VALUE: 3
PIF_VALUE: 2
PIF_VALUE: 3
PIF_VALUE: 3
PIF_VALUE: 11
PIF_VALUE: 3
PIF_VALUE: 3
PIF_VALUE: 4
PIF_VALUE: 4
PIF_VALUE: 2
PIF_VALUE: 3
PIF_VALUE: 19
PIF_VALUE: 2
PIF_VALUE: 3
PIF_VALUE: 4
PIF_VALUE: 3
PIF_VALUE: 2
PIF_VALUE: 1
PIF_VALUE: 3

## 2022-05-09 ASSESSMENT — PAIN DESCRIPTION - LOCATION
LOCATION: LEG
LOCATION: BACK;LEG
LOCATION: LEG
LOCATION: LEG
LOCATION: BACK;LEG
LOCATION: LEG

## 2022-05-09 ASSESSMENT — PAIN DESCRIPTION - DESCRIPTORS
DESCRIPTORS: STABBING
DESCRIPTORS: ACHING
DESCRIPTORS: ACHING;DISCOMFORT;SORE
DESCRIPTORS: ACHING
DESCRIPTORS: ACHING
DESCRIPTORS: THROBBING
DESCRIPTORS: STABBING
DESCRIPTORS: ACHING
DESCRIPTORS: ACHING

## 2022-05-09 ASSESSMENT — PAIN DESCRIPTION - ONSET
ONSET: ON-GOING

## 2022-05-09 ASSESSMENT — PAIN SCALES - GENERAL
PAINLEVEL_OUTOF10: 8
PAINLEVEL_OUTOF10: 7
PAINLEVEL_OUTOF10: 4
PAINLEVEL_OUTOF10: 8
PAINLEVEL_OUTOF10: 2
PAINLEVEL_OUTOF10: 9
PAINLEVEL_OUTOF10: 8

## 2022-05-09 ASSESSMENT — ENCOUNTER SYMPTOMS
COUGH: 0
TROUBLE SWALLOWING: 0
VOMITING: 0
SORE THROAT: 0
CHOKING: 0
STRIDOR: 0
BACK PAIN: 0
COLOR CHANGE: 0
SHORTNESS OF BREATH: 0
ABDOMINAL DISTENTION: 0
NAUSEA: 0
ABDOMINAL PAIN: 0
BLOOD IN STOOL: 0

## 2022-05-09 ASSESSMENT — PAIN DESCRIPTION - DIRECTION: RADIATING_TOWARDS: DOWN

## 2022-05-09 ASSESSMENT — PAIN DESCRIPTION - ORIENTATION
ORIENTATION: RIGHT

## 2022-05-09 ASSESSMENT — PAIN DESCRIPTION - PAIN TYPE
TYPE: SURGICAL PAIN
TYPE: ACUTE PAIN;CHRONIC PAIN
TYPE: SURGICAL PAIN

## 2022-05-09 ASSESSMENT — PAIN - FUNCTIONAL ASSESSMENT
PAIN_FUNCTIONAL_ASSESSMENT: ACTIVITIES ARE NOT PREVENTED
PAIN_FUNCTIONAL_ASSESSMENT: PREVENTS OR INTERFERES SOME ACTIVE ACTIVITIES AND ADLS
PAIN_FUNCTIONAL_ASSESSMENT: 0-10

## 2022-05-09 ASSESSMENT — PAIN DESCRIPTION - FREQUENCY
FREQUENCY: CONTINUOUS

## 2022-05-09 NOTE — CONSULTS
Department of GeneralSurgery   Surgical Service Dr. Gonzalez November Note    Date of Consult: 5/9/22         Reason for Consult:R thigh hematoma s/p surgery        CHIEF COMPLAINT:  r thigh wound     History Obtained From:  patient    HISTORY OF PRESENT ILLNESS:                The patient is a 39 y.o. male who presents after recent surgery for excision of large cyst/soft tissue mass from right thigh. This is developed after history of trauma to area. Cavity was large and drain placed. Over weekend drain stopped draining and reports swelling over thigh. Reports fevers. CT showed large complex fluid collection. We will plan for washout of this wound and placement of larger drain. Discussed risks and benefits with patient. We will try to wash out and place drain. If this does not resolve he understands we will require ongoing wound care including possible open wound and placing wound VAC and ongoing wound care. Agrees to proceed with procedure.       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     L5, S1    Depression     Hyperlipidemia     Neuropathy 2020    Seizures (HCC)        Past Surgical History:    Past Surgical History:   Procedure Laterality Date    EYE SURGERY      for amblyopia    SKIN BIOPSY Right 5/5/2022    RIGHT LATERAL THIGH LESION BIOPSY EXCISION performed by Leonela Platt DO at [address]      TYMPANOSTOMY TUBE PLACEMENT         Current Medications:   Current Facility-Administered Medications   Medication Dose Route Frequency Provider Last Rate Last Admin    sodium chloride flush 0.9 % injection 5-40 mL  5-40 mL IntraVENous 2 times per day LISBET Thompson - CNP   10 mL at 05/08/22 2396    sodium chloride flush 0.9 % injection 5-40 mL  5-40 mL IntraVENous PRN Nia Hernandez APRN - CNP        0.9 % sodium chloride infusion   IntraVENous PRN Makayla Purl, APRN - CNP        ondansetron (ZOFRAN-ODT) disintegrating tablet 4 mg  4 mg Oral Q8H PRN [place] Purl, APRN - CNP        Or    ondansetron [facility]) injection 4 mg  4 mg IntraVENous Q6H PRN [place] Purl, APRN - CNP        polyethylene glycol (GLYCOLAX) packet 17 g  17 g Oral Daily PRN [place] Purl, APRN - CNP        acetaminophen (TYLENOL) tablet 650 mg  650 mg Oral Q6H PRN [place] Purl, APRN - CNP   650 mg at 05/09/22 0231    Or    acetaminophen (TYLENOL) suppository 650 mg  650 mg Rectal Q6H PRN [place] Purl, APRN - CNP        clindamycin (CLEOCIN) 600 mg in dextrose 5 % 50 mL IVPB  600 mg IntraVENous Q8H Makayla Purl, APRN -  mL/hr at 05/09/22 0706 600 mg at 05/09/22 0706    morphine (PF) injection 2 mg  2 mg IntraVENous Q3H PRN [place] Purl, APRN - CNP   2 mg at 05/09/22 0731    0.9 % sodium chloride infusion   IntraVENous Continuous [place] Purl, APRN -  mL/hr at 05/08/22 2312 New Bag at 05/08/22 2312    ARIPiprazole (ABILIFY) tablet 7.5 mg  7.5 mg Oral Daily Makayla Purl, APRN - CNP        gemfibrozil (LOPID) tablet 600 mg  600 mg Oral BID AC [place] Purl, APRN - CNP   600 mg at 05/09/22 9264    PARoxetine (PAXIL) tablet 40 mg  40 mg Oral Daily Makayla Purl, APRN - CNP        traZODone (DESYREL) tablet 50 mg  50 mg Oral Nightly PRN [place] Purl, APRN - CNP           Allergies:  Amoxicillin    Social History:   Social History     Socioeconomic History    Marital status: Legally      Spouse name: None    Number of children: 3    Years of education: None    Highest education level: None   Occupational History    Occupation: Landscaping   Tobacco Use    Smoking status: Former Smoker    Smokeless tobacco: Never Used   Vaping Use    Vaping Use: Never used   Substance and Sexual Activity    Alcohol use: Not Currently     Comment: occasional    Drug use: Not Currently Types: Marijuana (Weed)     Comment: in the past- marijuana Many years ago    Sexual activity: Yes     Partners: Female   Other Topics Concern    None   Social History Narrative    None     Social Determinants of Health     Financial Resource Strain: Low Risk     Difficulty of Paying Living Expenses: Not hard at all   Food Insecurity: No Food Insecurity    Worried About Running Out of Food in the Last Year: Never true    Primitivo of Food in the Last Year: Never true   Transportation Needs:     Lack of Transportation (Medical): Not on file    Lack of Transportation (Non-Medical): Not on file   Physical Activity:     Days of Exercise per Week: Not on file    Minutes of Exercise per Session: Not on file   Stress:     Feeling of Stress : Not on file   Social Connections:     Frequency of Communication with Friends and Family: Not on file    Frequency of Social Gatherings with Friends and Family: Not on file    Attends Bahai Services: Not on file    Active Member of Clubs or Organizations: Not on file    Attends Club or Organization Meetings: Not on file    Marital Status: Not on file   Intimate Partner Violence:     Fear of Current or Ex-Partner: Not on file    Emotionally Abused: Not on file    Physically Abused: Not on file    Sexually Abused: Not on file   Housing Stability:     Unable to Pay for Housing in the Last Year: Not on file    Number of [place] in the Last Year: Not on file    Unstable Housing in the Last Year: Not on file       Family History:   Family History   Problem Relation Age of Onset    Ovarian Cancer Mother     Bipolar Disorder Mother     Lung Cancer Father     Other Brother         cerebral palsy    Diabetes Maternal Grandmother     Diabetes Paternal Grandmother        REVIEW OFSYSTEMS:    Review of Systems   Constitutional: Positive for fever. Negative for chills, fatigue and unexpected weight change.    HENT: Negative for sore throat and trouble swallowing. Respiratory: Negative for cough, choking, shortness of breath and stridor. Cardiovascular: Negative for chest pain, palpitations and leg swelling. Gastrointestinal: Negative for abdominal distention, abdominal pain, blood in stool, nausea and vomiting. Musculoskeletal: Negative for back pain, gait problem and joint swelling. Skin: Positive for wound. Negative for color change and rash. Allergic/Immunologic: Negative for immunocompromised state. Neurological: Negative for dizziness, speech difficulty, weakness and light-headedness. Hematological: Negative for adenopathy. Does not bruise/bleed easily. Psychiatric/Behavioral: Negative for agitation and confusion. The patient is not nervous/anxious. PHYSICAL EXAM:  Vitals:    05/08/22 2157 05/09/22 0222 05/09/22 0233 05/09/22 0413   BP: 117/75 109/67 115/70 (!) 106/58   Pulse: 95 91  82   Resp: 18 18  18   Temp: 99.5 °F (37.5 °C) 100.7 °F (38.2 °C)  98.8 °F (37.1 °C)   TempSrc: Oral Oral  Oral   SpO2: 98% 98%  98%   Weight:    224 lb 10.4 oz (101.9 kg)   Height:           Physical Exam  General: No acute distress  Neck: No JVD, supple  Lungs: Chest rise equal bilaterally  Cardiac: Appears well perfused   Abdomen: Soft, nontender, nondistended, no rebound or guarding  Extremities, no edema, cyanosis, or clubbing. Right thigh incision intact. Swelling/fluctuance over site. No drainage from drain.   Skin: No rashes or breakdown  Neurologic: cranial nerves II - XII grossly intact    DATA:    CBC:   Lab Results   Component Value Date    WBC 3.8 05/08/2022    RBC 4.98 05/08/2022    HGB 15.5 05/08/2022    HCT 45.0 05/08/2022    MCV 90.4 05/08/2022    MCH 31.1 05/08/2022    MCHC 34.4 05/08/2022    RDW 11.7 05/08/2022     05/08/2022    MPV 9.3 05/08/2022     IMPRESSION:        Patient Active Problem List:     Chronic low back pain     Bipolar 2 disorder (HCC)     Anxiety     ADHD     Depression     Postoperative complication of

## 2022-05-09 NOTE — PROGRESS NOTES
Hospitalist Progress Note      Name:  Raul Colón /Age/Sex: 1985  (39 y.o. male)   MRN & CSN:  [id number] & [id number] Admission Date/Time: 2022  3:17 PM   Location:  OR/NONE PCP: Pham Connell 57 Day: 2    ASSESSMENT & PLAN:  Raul Colón is a 39 y.o.  male who presented to hospital complaint of right thigh pain, swelling, subjective fevers and minimal drain output. Patient underwent excision of a symptomatic soft tissue mass/cyst over the lateral thigh on 2022. CT demonstrated large complex collection centered along the lateral soft tissue of the right thigh. Collection measures approximately 3.4 x 8.5 x 18 cm in size with presence of gas in the collection. 1.  Large right lateral thigh fluid collection, unclear if abscess or hematoma  2. Depression/anxiety, on trazodone Abilify, paroxetine  3. ADHD, on Ritalin    Plan  Continue clindamycin  OR today  Daily BMP/CBC    Diet Diet NPO   DVT Prophylaxis [x] Lovenox, []  Heparin, [] SCDs, [] Ambulation   GI Prophylaxis [] PPI,  [] H2 Blocker,  [] Carafate,  [] Diet/Tube Feeds   Code Status Full Code   Disposition  Home   MDM [] Low, [x] Moderate,[]  High     Chief complaint/Interval History/ROS     Chief Complaint: Right eye pain, swelling, subjective fevers    INTERVAL HISTORY:  Patient has not had fever while in the hospital.  There is no drainage from the surgical drain within the right lateral thigh. ROS:  Patient is complaining of pain at the lateral thigh area. He is also complaining of pain on the foot. Objective:        Intake/Output Summary (Last 24 hours) at 2022 1058  Last data filed at 2022 0916  Gross per 24 hour   Intake --   Output 700 ml   Net -700 ml      Vitals:   Vitals:    22 0925   BP: 135/81   Pulse: 87   Resp: 15   Temp: 100 °F (37.8 °C)   SpO2: 98%     Physical Exam:   GEN: Awake male, laying flat in bed, does not appear to be in distress, appears stated age  EYES: Sclera anicteric, no eye discharge. HENT: Mucous membranes dry  NECK: Supple  RESP: Clear to auscultation bilaterally  CV: RRR, no pitting extremity edema  GI: Abdomen soft, nontender, nondistended  : No Juarez, external genitalia not examined  MSK: No cyanosis, no clubbing  NEURO: Cranial nerves appear grossly intact, normal speech, no lateralizing weakness. PSYCH: Awake, alert, oriented x 4.     Medications:   Medications:    ceFAZolin  2,000 mg IntraVENous Q8H    sodium chloride flush  5-40 mL IntraVENous 2 times per day    clindamycin (CLEOCIN) IV  600 mg IntraVENous Q8H    ARIPiprazole  7.5 mg Oral Daily    gemfibrozil  600 mg Oral BID AC    PARoxetine  40 mg Oral Daily      Infusions:    sodium chloride 100 mL/hr at 05/09/22 1028     PRN Meds: sodium chloride flush, 5-40 mL, PRN  sodium chloride, , PRN  ondansetron, 4 mg, Q8H PRN   Or  ondansetron, 4 mg, Q6H PRN  polyethylene glycol, 17 g, Daily PRN  acetaminophen, 650 mg, Q6H PRN   Or  acetaminophen, 650 mg, Q6H PRN  morphine, 2 mg, Q3H PRN  traZODone, 50 mg, Nightly PRN          Electronically signed by Darlen Harada, MD on 5/9/2022 at 10:58 AM

## 2022-05-09 NOTE — H&P
History and Physical      Name:  Nery Valdez /Age/Sex: 1985  (39 y.o. male)   MRN & CSN:  [id number] & [id number] Admission Date/Time: 2022  3:17 PM   Location:  ED17/ED-17 PCP: Kris Shine [address] [place] [address] Day: 1     Attending physician: Dr. Nhan Metz and Plan:   Nery Valdez is a 39 y.o.  male  who presents with Postoperative complication of skin involving drainage from surgical wound    Assessment and plan:     Postoperative complication right thigh surgical site  Status post hematoma/right thigh mass with cyst  -no signs of osteomyelitis on CT imaging. Swelling and tenderness to touch, drain does not appear to be functioning. States no fluid in drain since leaving the hospital.  States he called on-call who instructed him to milk the drain tube. Lactic acid-1.2, leukopenia 3.8. Patient reports subjective fevers and chills at home. CT femur right leg-large complex collection centered along the lateral soft tissues of the right thigh with surgical drain in place which is centered within the collection. Collection measures approximately 3.4 x 8.5 x 18 cm gas is present within the collection. No acute osseous findings. -Inpatient  -N.p.o. after midnight  -Analgesics antiemetics  -MIVF  -Consult general surgery  -Blood cultures pending  -Inflammatory markers pending, sed rate, CRP, procalcitonin  -Clindamycin 600 mg every 8 hours initial dose given in ER.  -Monitor daily labs  -/-tissue culture-no growth 60 to 72 hours-aerobic culture. Bipolar 2 disorder with anxiety and depression: Continue outpatient regimen. Abilify, paroxetine, trazodone as needed at bedtime    Hyperlipidemia: Continue PTA regimen gemfibrozil 600 mg twice daily    ADHD: Managed on Ritalin. Patient states he usually does not take when he is in the hospital.    Chronic Conditions: Continue all home medications except as stated above or contraindicated.     BMI 28.89: kg/m2 Life style modifications    Disposition: Inpatient. Patient was accepted for continue evaluation and treatment and improvement of clinical symptoms. Discussed assessment and treatment plan with the admitting and supervising physician who agrees with the plan. Diet Diet NPO  ADULT DIET; Regular   DVT Prophylaxis [] Lovenox, []  Heparin, [x] SCDs, [] Warfarin  [] NOAC     GI Prophylaxis [] PPI,  [] H2 Blocker,  [] Carafate,  [] Diet/Tube Feeds   Code Status Full Code   Alternate decision-maker: Gerson Stockton     History of Present Illness:     Chief Complaint: Postoperative complication of skin involving drainage from surgical wound  Candida Price is a 39 y.o.  male, with past medical history significant for bipolar, anxiety, depression, hypertriglycerides, ADHD, who presented to the emergency room with complaint of subjective fever, right thigh pain, swelling drainage from surgical site. Patient states he has had increasing right thigh pain drainage from drain exit site but drain itself has not been draining. States he had surgery Thursday 5/5 at this facility with Dr. Mindi Sylvester for removal of a large hematoma that has been in his right thigh. Patient states last July he was chased by a dog and had falling down a hill had a very large bruise to the thigh states he was not bit by a dog and advised that the dog tried to bite his foot. Patient states he called his surgeon's office and spoke with the on-call was instructed to attempt milking the tube but this was ineffective. States drainage only came out from around the ostomy site from where the tube exits. Patient endorses having a fever at home of up to 103°F, endorses increased pain in the leg with touch. States at times pain will radiate down his leg. Patient denies any numbness or tingling in extremity. Denies any chest pain or palpitations. Denies any chills, vomiting or nausea.   Patient states there was bleeding going down his leg and he has used multiple dressings trying to stop to follow-up blood. On Examination,the patient is able to state history and onset of symptoms. At the ED, vital signs;T98.5,, RR 16, /105 mm/hg,SPO2 97% RA. Significant laboratories were the following: WBC 3.8, 614, monocytes 17, otherwise labs are unremarkable. The patient was brought to the ED and was subsequently admitted for  further evaluation. and management          Review of Systems   Constitutional: Positive for chills and fever. Negative for fatigue. HENT: Negative for congestion. Respiratory: Negative for cough and shortness of breath. Cardiovascular: Negative for chest pain and palpitations. Gastrointestinal: Negative for abdominal pain, nausea and vomiting. Genitourinary: Negative for dysuria, frequency and urgency. Musculoskeletal: Negative for joint swelling. Right thigh pain   Skin:        Right thigh surgical wound, sutures are intact, mild erythremia around suture line, no diffuse drainage. Drain in place   Neurological: Negative. Hematological: Negative. Psychiatric/Behavioral: Negative. Objective:   No intake or output data in the 24 hours ending 05/08/22 2055   Vitals:   Vitals:    05/08/22 2015   BP: 117/93   Pulse: 104   Resp: 18   Temp:    SpO2: 99%     Physical Exam:   GEN- Awake male, NAD, sitting up in bed, appears to be stated age. EYES- Pupils are equally round. HENT- Mucous membranes are moist.   NECK- Supple, no apparent thyromegaly or masses. RESP-Clear to auscultation, no wheezes, rales or rhonchi. Symmetric chest movement while on room air. CARDIO/VASC-   Regular rate and rhythm, Peripheral pulses equal bilaterally and palpable. GI- Abdomen is soft, no tenderness, masses, or guarding. Bowel sounds present. MSK- No gross joint deformities.   EXTREMITIES- pulses intact, no swelling, no calf tenderness  SKIN- surgical site right thigh edges are approximated, sutures in place, mild erythema around suture, drain noted, dried blood noted around drain. Area around thigh is firm to touch, patient endorses tenderness  NEURO-Cranial nerves appear grossly intact, normal speech, no lateralizing weakness. PSYCH-Awake, alert, oriented x 4. 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 Allergies: Allergies   Allergen Reactions    Amoxicillin Diarrhea       FAM HX: family history includes Bipolar Disorder in his mother; Diabetes in his maternal grandmother and paternal grandmother; Desma Ken in his father; Other in his brother; Ovarian Cancer in his mother. Soc HX:   Social History     Socioeconomic History    Marital status: Legally      Spouse name: None    Number of children: 3    Years of education: None    Highest education level: None   Occupational History    Occupation: Landscaping   Tobacco Use    Smoking status: Former Smoker    Smokeless tobacco: Never Used   Vaping Use    Vaping Use: Never used   Substance and Sexual Activity    Alcohol use: Not Currently     Comment: occasional    Drug use: Not Currently     Types: Marijuana (Weed)     Comment: in the past- marijuana Many years ago    Sexual activity: Yes     Partners: Female   Other Topics Concern    None   Social History Narrative    None     Social and family history reviewed with patient/family. Medications:     Home Medication   Prior to Admission medications    Medication Sig Start Date End Date Taking?  Authorizing Provider   HYDROcodone-acetaminophen (NORCO) 5-325 MG per tablet Take 1 tablet by mouth every 4 hours as needed for Pain for up to 7 days. Intended supply: 3 days. Take lowest dose possible to manage pain 5/5/22 5/12/22  Julianna Garcia,    gemfibrozil (LOPID) 600 MG tablet Take 1 tablet by mouth 2 times daily 4/28/22   Will High, DO   traZODone (DESYREL) 50 MG tablet nightly PRN 11/15/20   Historical Provider, MD   PARoxetine HCl (PAXIL PO) Take by mouth    Historical Provider, MD   methylphenidate (RITALIN) 20 MG tablet Take one and one-half a day    Historical Provider, MD   ARIPiprazole (ABILIFY PO) Take by mouth    Historical Provider, MD     Medications:    clindamycin (CLEOCIN) IV  900 mg IntraVENous Once      Infusions:    sodium chloride 1,000 mL (05/08/22 1603)     PRN Meds: sodium chloride flush, 10 mL, PRN        Recent Labs     05/08/22  1605   WBC 3.8*   HGB 15.5   HCT 45.0         Recent Labs     05/08/22  1605      K 3.6      CO2 24   BUN 10   CREATININE 0.9     No results for input(s): AST, ALT, ALB, BILIDIR, BILITOT, ALKPHOS in the last 72 hours. Recent Labs     05/08/22  1605   INR 0.90     No results for input(s): CKTOTAL, CKMB, CKMBINDEX, TROPONINT in the last 72 hours. Imaging reviewed  CT FEMUR RIGHT W CONTRAST    Result Date: 5/8/2022  EXAMINATION: CT OF THE RIGHT FEMUR WITH CONTRAST 5/8/2022 6:37 pm TECHNIQUE: CT of the right femur was performed with the administration of intravenous contrast.  Multiplanar reformatted images are provided for review. Dose modulation, iterative reconstruction, and/or weight based adjustment of the mA/kV was utilized to reduce the radiation dose to as low as reasonably achievable.  COMPARISON: MRI right femur April 8, 2022 HISTORY ORDERING SYSTEM PROVIDED HISTORY: Worsening operative site drainage and bleeding, concern for fluid collection or infection TECHNOLOGIST PROVIDED HISTORY: Reason for exam:->Worsening operative site drainage and bleeding, concern for fluid collection or infection Reason for Exam: Worsening operative site drainage and bleeding, concern for fluid collection or infection FINDINGS: Bones: No acute fracture identified. No suspicious lytic or sclerotic osseous lesions. No osteolysis or periosteal reaction evident. Soft Tissue: Postoperative changes noted along the right lateral thigh with complex fluid collection present containing gas. The collection measures approximately 3.4 x 8.5 x 18 cm (image 74 series 3; image 33 series 602). A surgical drain is centered within the complex collection. The collection is seen to extend along the superficial fascial plane of the anterior compartment musculature and corresponds to location of previously seen small crescentic fluid collection with internal solid nodularity on comparison MRI from April 8, 2022. No solid soft tissue density identified on today's exam. Joint: Anatomic alignment of the imaged right hip and right knee. No significant knee effusion. 1. Large complex collection centered along the lateral soft tissues of the right thigh with surgical drain in place which is centered within the collection. The collection measures approximately 3.4 x 8.5 x 18 cm. Gas is present within the collection. Please note sterility of this fluid is indeterminate on imaging. Recommend correlation with fluid drainage from surgical drain. 2. No acute osseous findings.           Electronically signed by LISBET Marlow CNP on 5/8/2022 at 8:55 PM

## 2022-05-09 NOTE — ANESTHESIA PRE PROCEDURE
Department of Anesthesiology  Preprocedure Note       Name:  Neris Castillo   Age:  39 y.o.  :  1985                                          MRN:  [id number]         Date:  2022      Surgeon: Ele Camacho):  Julianna Garcia DO    Procedure: Procedure(s):  RIGHT THIGH DEBRIDEMENT INCISION AND DRAINAGE    Medications prior to admission:   Prior to Admission medications    Medication Sig Start Date End Date Taking? Authorizing Provider   ARIPiprazole (ABILIFY) 5 MG tablet Take 7.5 mg by mouth daily 4/15/22   Historical Provider, MD   PARoxetine (PAXIL) 40 MG tablet Take 40 mg by mouth daily 4/15/22   Historical Provider, MD   HYDROcodone-acetaminophen (NORCO) 5-325 MG per tablet Take 1 tablet by mouth every 4 hours as needed for Pain for up to 7 days. Intended supply: 3 days.  Take lowest dose possible to manage pain 22  Julianna Garcia DO   gemfibrozil (LOPID) 600 MG tablet Take 1 tablet by mouth 2 times daily 22   Will High, DO   traZODone (DESYREL) 50 MG tablet nightly PRN 11/15/20   Historical Provider, MD   methylphenidate (RITALIN) 20 MG tablet Take one and one-half a day    Historical Provider, MD       Current medications:    Current Facility-Administered Medications   Medication Dose Route Frequency Provider Last Rate Last Admin    sodium chloride flush 0.9 % injection 5-40 mL  5-40 mL IntraVENous 2 times per day LISBET Ackerman - CNP   10 mL at 22 2335    sodium chloride flush 0.9 % injection 5-40 mL  5-40 mL IntraVENous PRN LISBET Ackerman - CNP        0.9 % sodium chloride infusion   IntraVENous PRN LISBET Ackerman - MARCELLUS        ondansetron (ZOFRAN-ODT) disintegrating tablet 4 mg  4 mg Oral Q8H PRN LISBET Ackerman CNP        Or    ondansetron [facility]) injection 4 mg  4 mg IntraVENous Q6H PRN Cara Shearer APRN - CNP        polyethylene glycol (GLYCOLAX) packet 17 g  17 g Oral Daily PRN Cara Shearer APRN - MARCELLUS        acetaminophen (TYLENOL) tablet 650 mg  650 mg Oral Q6H PRN Jose Dura, APRN - CNP   650 mg at 05/09/22 0231    Or    acetaminophen (TYLENOL) suppository 650 mg  650 mg Rectal Q6H PRN Jose Dura, APRN - CNP        clindamycin (CLEOCIN) 600 mg in dextrose 5 % 50 mL IVPB  600 mg IntraVENous Q8H Jose Dura, APRN -  mL/hr at 05/09/22 0706 600 mg at 05/09/22 0706    morphine (PF) injection 2 mg  2 mg IntraVENous Q3H PRN Jose Dura, APRN - CNP   2 mg at 05/09/22 0731    0.9 % sodium chloride infusion   IntraVENous Continuous Jose Dura, APRN -  mL/hr at 05/08/22 2312 New Bag at 05/08/22 2312    ARIPiprazole (ABILIFY) tablet 7.5 mg  7.5 mg Oral Daily Jose Dura, APRN - CNP        gemfibrozil (LOPID) tablet 600 mg  600 mg Oral BID AC Jose Dura, APRN - CNP   600 mg at 05/09/22 0706    PARoxetine (PAXIL) tablet 40 mg  40 mg Oral Daily Jose Dura, APRN - CNP        traZODone (DESYREL) tablet 50 mg  50 mg Oral Nightly PRN Jose Dura, APRN - CNP           Allergies: Allergies   Allergen Reactions    Amoxicillin Diarrhea       Problem List:    Patient Active Problem List   Diagnosis Code    Chronic low back pain M54.50, G89.29    Bipolar 2 disorder (HCC) F31.81    Anxiety F41.9    ADHD F90.9    Depression F32. 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     L5, S1    Depression     Hyperlipidemia     Neuropathy 2020    Seizures ([facility] [address]        Past Surgical History:        Procedure Laterality Date    EYE SURGERY      for amblyopia    SKIN BIOPSY Right 5/5/2022    RIGHT LATERAL THIGH LESION BIOPSY EXCISION performed by Mandi Leroy DO at [address] [place] [address] History:    Social History     Tobacco Use    Smoking status: Former Smoker    Smokeless tobacco: Never Used   Substance Use Topics    Alcohol use: Not Currently     Comment: occasional                                Counseling given: Not Answered      Vital Signs (Current):   Vitals:    05/08/22 2157 05/09/22 0222 05/09/22 0233 05/09/22 0413   BP: 117/75 109/67 115/70 (!) 106/58   Pulse: 95 91  82   Resp: 18 18  18   Temp: 37.5 °C (99.5 °F) 38.2 °C (100.7 °F)  37.1 °C (98.8 °F)   TempSrc: Oral Oral  Oral   SpO2: 98% 98%  98%   Weight:    224 lb 10.4 oz (101.9 kg)   Height:                                                  BP Readings from Last 3 Encounters:   05/09/22 (!) 106/58   05/05/22 (!) 86/37   05/05/22 124/60       NPO Status:                                                                      11 hrs. BMI:   Wt Readings from Last 3 Encounters:   05/09/22 224 lb 10.4 oz (101.9 kg)   05/05/22 224 lb (101.6 kg)   04/28/22 224 lb (101.6 kg)     Body mass index is 28.84 kg/m². CBC:   Lab Results   Component Value Date    WBC 3.8 05/08/2022    RBC 4.98 05/08/2022    HGB 15.5 05/08/2022    HCT 45.0 05/08/2022    MCV 90.4 05/08/2022    RDW 11.7 05/08/2022     05/08/2022       CMP:   Lab Results   Component Value Date     05/08/2022    K 3.6 05/08/2022     05/08/2022    CO2 24 05/08/2022    BUN 10 05/08/2022    CREATININE 0.9 05/08/2022    GFRAA >60 05/08/2022    LABGLOM >60 05/08/2022    GLUCOSE 93 05/08/2022    PROT 6.3 05/04/2022    CALCIUM 8.8 05/08/2022    BILITOT 0.8 05/04/2022    ALKPHOS 86 05/04/2022    AST 20 05/04/2022    ALT 37 05/04/2022       POC Tests: No results for input(s): POCGLU, POCNA, POCK, POCCL, POCBUN, POCHEMO, POCHCT in the last 72 hours.     Coags:   Lab Results   Component Value Date    PROTIME 11.6 05/08/2022    INR 0.90 05/08/2022    APTT 26.8 05/08/2022       HCG (If Applicable): No results found for: PREGTESTUR, PREGSERUM, HCG, HCGQUANT     ABGs: No results found for: PHART, PO2ART, PWG1JKL, CVN5IDD, BEART, B6OSADRH     Type & Screen (If Applicable):  No results found for: LABABO, LABRH    Drug/Infectious Status (If Applicable):  No results found for: HIV, HEPCAB    COVID-19 Screening (If Applicable):   Lab Results   Component Value Date    COVID19 NOT DETECTED 05/02/2021           Anesthesia Evaluation  Patient summary reviewed no history of anesthetic complications:   Airway: Mallampati: III  TM distance: >3 FB   Neck ROM: full  Mouth opening: > = 3 FB Dental:    (+) edentulous      Pulmonary:normal exam                              ROS comment: Former Smoker   Cardiovascular:Negative CV ROS  Exercise tolerance: good (>4 METS),            Beta Blocker:  Not on Beta Blocker         Neuro/Psych:   (+) seizures:, psychiatric history:depression/anxiety              ROS comment: Bipolar 2 disorder  GI/Hepatic/Renal: Neg GI/Hepatic/Renal ROS            Endo/Other: Negative Endo/Other ROS                    Abdominal:             Vascular: negative vascular ROS. Other Findings:           Anesthesia Plan      general     ASA 2       Induction: intravenous. Anesthetic plan and risks discussed with patient. LISBET Alexander CRNA   5/9/2022        Pre Anesthesia Evaluation complete. Anesthesia plan, risks, benefits, alternatives, and personnel discussed with patient and/or legal guardian. Patient and/or legal guardian verbalized an understanding and agreed to proceed. Anesthesia plan discussed with care team members and agreed upon.   LISBET Alexander CRNA  5/9/2022

## 2022-05-09 NOTE — PROGRESS NOTES
1125: Patient arrived to PACU from OR. Monitors applied, alarms on. Surgical dressing in place, wound vac on. Report obtained from [facility] and [address] [place] [address]. 1145: Patient repositioned in bed. Pt tolerating sips of water. 1150: Wound vac beeping at bedside at this time, wound team called to come check connections in pacu. Stated will be down shortly. 1200: Wound team at bedside checking wound vac dressing at this time. Gave ok with wound vac, reinforced dressing. 1210: Report called to [facility] for room [id number]. 1217: Patient transferred to room 1122.

## 2022-05-09 NOTE — OP NOTE
Operative Note      Patient: Aysa Marie  YOB: 1985  MRN: [id number]    Date of Procedure: 5/9/2022    Pre-Op Diagnosis: Postop hematoma    Post-Op Diagnosis: Same       Procedure(s):  RIGHT THIGH DEBRIDEMENT INCISION AND DRAINAGE AND WASHOUT AND WOUND VAC PLACEMENT    Surgeon(s):  Brynn Jean DO    Assistant:   * No surgical staff found *    Anesthesia: General    Estimated Blood Loss (mL): Minimal    Complications: None    Specimens:   ID Type Source Tests Collected by Time Destination   1 : DRAIN SITE CULTURE Specimen Leg CULTURE, SURGICAL Brynn Jean DO 5/9/2022 1112        Implants:  * No implants in log *      Drains:   Negative Pressure Wound Therapy Leg Anterior;Right;Upper (Active)       [REMOVED] Closed/Suction Drain Right Thigh Bulb (Removed)   Site Description Clean, dry & intact 05/05/22 1023   Dressing Status Clean, dry & intact 05/05/22 1023   Drainage Appearance None 05/05/22 1023   Drain Status To bulb suction 05/05/22 1023       Findings: Hematoma    Detailed Description of Procedure: Indication: Status post excision of large pseudocyst with soft tissue masses secondary to history of prior trauma. He underwent removal of the soft tissue masses which were contained in a very large thickened wall pseudocyst.  Drain was placed postop however this clogged and he developed a postoperative hematoma. Plan today for washout of hematoma. Discussed risks including plan for washout and if needed wound VAC placement. Procedure: Patient was taken the OR and laid in supine position. After induction of general endotracheal anesthesia right thigh was prepped and draped in usual sterile fashion. The drain was removed and there was drainage of old blood. The incision was opened and hematoma was evacuated. Did not appear infected. Wound was cleaned. Wound was irrigated with pulse suction lavage.   Next a large black sponge was trimmed to appropriate size and placed in wound.  A second layer for the incision. The wound VAC drape was then applied and suction applied with good seal.    Wound base 18 x 10 cm. Incision 12 cm in length. Patient tolerated seizure well, without complication, and the end of case was transported to the recovery area in stable condition.     Electronically signed by Stephen Montoya DO on 5/9/2022 at 11:17 AM

## 2022-05-09 NOTE — CONSULTS
[facility] Continence Nurse  Consult Note       Gypsy Apgar  AGE: 39 y.o. GENDER: male  : 1985  TODAY'S DATE:  2022    Subjective:     Reason for CWOCN Evaluation and Assessment: NPWT troubleshooting      Gypsy Apgar is a 39 y.o. 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     L5, S1    Depression     Hyperlipidemia     Neuropathy 2020    Seizures ([facility] [address]        PAST SURGICAL HISTORY    Past Surgical History:   Procedure Laterality Date    EYE SURGERY      for amblyopia    SKIN BIOPSY Right 2022    RIGHT LATERAL THIGH LESION BIOPSY EXCISION performed by Edson Lewis DO at [place] [id number]      TYMPANOSTOMY TUBE PLACEMENT         FAMILY HISTORY    Family History   Problem Relation Age of Onset    Ovarian Cancer Mother     Bipolar Disorder Mother     Lung Cancer Father     Other Brother         cerebral palsy    Diabetes Maternal Grandmother     Diabetes Paternal Grandmother        SOCIAL HISTORY    Social History     Tobacco Use    Smoking status: Former Smoker    Smokeless tobacco: Never Used   Vaping Use    Vaping Use: Never used   Substance Use Topics    Alcohol use: Not Currently     Comment: occasional    Drug use: Not Currently     Types: Marijuana (Weed)     Comment: in the past- marijuana Many years ago       ALLERGIES    Allergies   Allergen Reactions    Amoxicillin Diarrhea       MEDICATIONS    No current facility-administered medications on file prior to encounter.      Current Outpatient Medications on File Prior to Encounter   Medication Sig Dispense Refill    ARIPiprazole (ABILIFY) 5 MG tablet Take 7.5 mg by mouth daily  PARoxetine (PAXIL) 40 MG tablet Take 40 mg by mouth daily      HYDROcodone-acetaminophen (NORCO) 5-325 MG per tablet Take 1 tablet by mouth every 4 hours as needed for Pain for up to 7 days. Intended supply: 3 days.  Take lowest dose possible to manage pain 30 tablet 0    gemfibrozil (LOPID) 600 MG tablet Take 1 tablet by mouth 2 times daily 60 tablet 2    traZODone (DESYREL) 50 MG tablet nightly PRN      methylphenidate (RITALIN) 20 MG tablet Take one and one-half a day           Objective:      /77   Pulse 74   Temp 97.8 °F (36.6 °C) (Temporal)   Resp 16   Ht 6' 2\" (1.88 m)   Wt 224 lb (101.6 kg)   SpO2 92%   BMI 28.76 kg/m²   Avinash Risk Score: Avinash Scale Score: 22    LABS    CBC:   Lab Results   Component Value Date    WBC 3.8 05/08/2022    RBC 4.98 05/08/2022    HGB 15.5 05/08/2022    HCT 45.0 05/08/2022    MCV 90.4 05/08/2022    MCH 31.1 05/08/2022    MCHC 34.4 05/08/2022    RDW 11.7 05/08/2022     05/08/2022    MPV 9.3 05/08/2022     CMP:    Lab Results   Component Value Date     05/08/2022    K 3.6 05/08/2022     05/08/2022    CO2 24 05/08/2022    BUN 10 05/08/2022    CREATININE 0.9 05/08/2022    GFRAA >60 05/08/2022    LABGLOM >60 05/08/2022    GLUCOSE 93 05/08/2022    PROT 6.3 05/04/2022    LABALBU 4.4 05/04/2022    CALCIUM 8.8 05/08/2022    BILITOT 0.8 05/04/2022    ALKPHOS 86 05/04/2022    AST 20 05/04/2022    ALT 37 05/04/2022     Albumin:    Lab Results   Component Value Date    LABALBU 4.4 05/04/2022     PT/INR:    Lab Results   Component Value Date    PROTIME 11.6 05/08/2022    INR 0.90 05/08/2022     HgBA1c:  No results found for: LABA1C      Assessment:     Patient Active Problem List   Diagnosis    Chronic low back pain    Bipolar 2 disorder (HCC)    Anxiety    ADHD    Depression    Postoperative complication of skin involving drainage from surgical wound       Measurements:  Negative Pressure Wound Therapy Leg Anterior;Right;Upper (Active)   Wound Type Surgical 05/09/22 1212   Unit Type KCI Ulta 05/09/22 1200   Dressing Type Black Foam 05/09/22 1212   Number of pieces used 4 05/09/22 1212   Cycle Continuous 05/09/22 1212   Target Pressure (mmHg) 125 05/09/22 1212   Canister changed? No 05/09/22 1200   Dressing Status Clean, dry & intact; New dressing applied 05/09/22 1212   Dressing Changed Dressing reinforced 05/09/22 1200   Drainage Amount Small 05/09/22 1200   Drainage Description Serosanguinous 05/09/22 1200   Wound Assessment Other (Comment) 05/09/22 1200   Number of days: 0       Response to treatment:  Well tolerated by patient. Pain Assessment:  Severity:  none  Quality of pain: na  Wound Pain Timing/Severity: na  Premedicated: no    Plan:     Plan of Care:       Called by PACU nurse to assess NPWT to right thigh that was alarming leak. RIGHT THIGH DEBRIDEMENT INCISION AND DRAINAGE AND WASHOUT AND WOUND VAC PLACEMENT per Dr. Melia Barba today. Seen in PACU. Pt in bed. NPWT appears to be functioning properly with adequate seal to 125 mmHg continuous suction. Black foam. Small incision separate from black foam with small serosanginous drainage under clear drape which may compromise seal so cut drape back. Staple noted. Cleansed with NS. DSD and drape applied to cover small incision. Tolerated well. Updated nurse. Specialty Bed Required : no  [] Low Air Loss   [] Pressure Redistribution  [] Fluid Immersion  [] Bariatric  [] Total Pressure Relief  [] Other:     Discharge Plan:  Placement for patient upon discharge: tbd  Hospice Care: no  Patient appropriate for Outpatient [address]: follow up with Dr. Melia Barba    Patient/Caregiver Teaching:  Level of patient/caregiver understanding able to:   Voiced understanding.         Electronically signed by Cecelia Cintron RN, Devon Vera on 5/9/2022 at 12:33 PM

## 2022-05-10 LAB
ANION GAP SERPL CALCULATED.3IONS-SCNC: 11 MMOL/L (ref 4–16)
BASOPHILS ABSOLUTE: 0 K/CU MM
BASOPHILS RELATIVE PERCENT: 0.4 % (ref 0–1)
BUN BLDV-MCNC: 19 MG/DL (ref 6–23)
CALCIUM SERPL-MCNC: 8.4 MG/DL (ref 8.3–10.6)
CHLORIDE BLD-SCNC: 103 MMOL/L (ref 99–110)
CO2: 24 MMOL/L (ref 21–32)
CREAT SERPL-MCNC: 1 MG/DL (ref 0.9–1.3)
CULTURE: NORMAL
DIFFERENTIAL TYPE: ABNORMAL
EOSINOPHILS ABSOLUTE: 0 K/CU MM
EOSINOPHILS RELATIVE PERCENT: 0.4 % (ref 0–3)
GFR AFRICAN AMERICAN: >60 ML/MIN/1.73M2
GFR NON-AFRICAN AMERICAN: >60 ML/MIN/1.73M2
GLUCOSE BLD-MCNC: 100 MG/DL (ref 70–99)
HCT VFR BLD CALC: 41.3 % (ref 42–52)
HEMOGLOBIN: 13.9 GM/DL (ref 13.5–18)
IMMATURE NEUTROPHIL %: 0.4 % (ref 0–0.43)
LYMPHOCYTES ABSOLUTE: 1.1 K/CU MM
LYMPHOCYTES RELATIVE PERCENT: 42.5 % (ref 24–44)
Lab: NORMAL
MCH RBC QN AUTO: 31.4 PG (ref 27–31)
MCHC RBC AUTO-ENTMCNC: 33.7 % (ref 32–36)
MCV RBC AUTO: 93.2 FL (ref 78–100)
MONOCYTES ABSOLUTE: 0.4 K/CU MM
MONOCYTES RELATIVE PERCENT: 17 % (ref 0–4)
NUCLEATED RBC %: 0 %
PDW BLD-RTO: 11.9 % (ref 11.7–14.9)
PLATELET # BLD: 272 K/CU MM (ref 140–440)
PMV BLD AUTO: 9.4 FL (ref 7.5–11.1)
POTASSIUM SERPL-SCNC: 3.9 MMOL/L (ref 3.5–5.1)
RBC # BLD: 4.43 M/CU MM (ref 4.6–6.2)
SEGMENTED NEUTROPHILS ABSOLUTE COUNT: 1 K/CU MM
SEGMENTED NEUTROPHILS RELATIVE PERCENT: 39.3 % (ref 36–66)
SODIUM BLD-SCNC: 138 MMOL/L (ref 135–145)
SPECIMEN: NORMAL
TOTAL IMMATURE NEUTOROPHIL: 0.01 K/CU MM
TOTAL NUCLEATED RBC: 0 K/CU MM
WBC # BLD: 2.6 K/CU MM (ref 4–10.5)

## 2022-05-10 PROCEDURE — 6370000000 HC RX 637 (ALT 250 FOR IP): Performed by: SURGERY

## 2022-05-10 PROCEDURE — G0378 HOSPITAL OBSERVATION PER HR: HCPCS

## 2022-05-10 PROCEDURE — 97606 NEG PRS WND THER DME>50 SQCM: CPT | Performed by: SURGERY

## 2022-05-10 PROCEDURE — 94761 N-INVAS EAR/PLS OXIMETRY MLT: CPT

## 2022-05-10 PROCEDURE — 6360000002 HC RX W HCPCS: Performed by: SURGERY

## 2022-05-10 PROCEDURE — 2500000003 HC RX 250 WO HCPCS: Performed by: SURGERY

## 2022-05-10 PROCEDURE — 10140 I&D HMTMA SEROMA/FLUID COLLJ: CPT | Performed by: SURGERY

## 2022-05-10 PROCEDURE — 85025 COMPLETE CBC W/AUTO DIFF WBC: CPT

## 2022-05-10 PROCEDURE — 80048 BASIC METABOLIC PNL TOTAL CA: CPT

## 2022-05-10 PROCEDURE — 36415 COLL VENOUS BLD VENIPUNCTURE: CPT

## 2022-05-10 PROCEDURE — 2580000003 HC RX 258: Performed by: SURGERY

## 2022-05-10 RX ADMIN — TRAZODONE HYDROCHLORIDE 50 MG: 50 TABLET ORAL at 21:19

## 2022-05-10 RX ADMIN — ONDANSETRON 4 MG: 4 TABLET, ORALLY DISINTEGRATING ORAL at 21:22

## 2022-05-10 RX ADMIN — ARIPIPRAZOLE 7.5 MG: 5 TABLET ORAL at 08:38

## 2022-05-10 RX ADMIN — HYDROCODONE BITARTRATE AND ACETAMINOPHEN 1 TABLET: 5; 325 TABLET ORAL at 09:51

## 2022-05-10 RX ADMIN — PAROXETINE HYDROCHLORIDE 40 MG: 20 TABLET, FILM COATED ORAL at 08:38

## 2022-05-10 RX ADMIN — ONDANSETRON 4 MG: 2 INJECTION INTRAMUSCULAR; INTRAVENOUS at 02:09

## 2022-05-10 RX ADMIN — SODIUM CHLORIDE: 9 INJECTION, SOLUTION INTRAVENOUS at 15:45

## 2022-05-10 RX ADMIN — CLINDAMYCIN IN 5 PERCENT DEXTROSE 600 MG: 12 INJECTION, SOLUTION INTRAVENOUS at 15:45

## 2022-05-10 RX ADMIN — SODIUM CHLORIDE, PRESERVATIVE FREE 10 ML: 5 INJECTION INTRAVENOUS at 08:38

## 2022-05-10 RX ADMIN — CLINDAMYCIN IN 5 PERCENT DEXTROSE 600 MG: 12 INJECTION, SOLUTION INTRAVENOUS at 06:43

## 2022-05-10 RX ADMIN — SODIUM CHLORIDE 25 ML: 9 INJECTION, SOLUTION INTRAVENOUS at 21:23

## 2022-05-10 RX ADMIN — CLINDAMYCIN IN 5 PERCENT DEXTROSE 600 MG: 12 INJECTION, SOLUTION INTRAVENOUS at 21:24

## 2022-05-10 RX ADMIN — GEMFIBROZIL 600 MG: 600 TABLET, FILM COATED ORAL at 06:42

## 2022-05-10 RX ADMIN — HYDROCODONE BITARTRATE AND ACETAMINOPHEN 1 TABLET: 5; 325 TABLET ORAL at 05:32

## 2022-05-10 RX ADMIN — GEMFIBROZIL 600 MG: 600 TABLET, FILM COATED ORAL at 16:41

## 2022-05-10 RX ADMIN — ONDANSETRON 4 MG: 4 TABLET, ORALLY DISINTEGRATING ORAL at 13:55

## 2022-05-10 RX ADMIN — SODIUM CHLORIDE, PRESERVATIVE FREE 10 ML: 5 INJECTION INTRAVENOUS at 21:20

## 2022-05-10 RX ADMIN — HYDROCODONE BITARTRATE AND ACETAMINOPHEN 1 TABLET: 5; 325 TABLET ORAL at 13:56

## 2022-05-10 RX ADMIN — HYDROCODONE BITARTRATE AND ACETAMINOPHEN 1 TABLET: 5; 325 TABLET ORAL at 21:19

## 2022-05-10 RX ADMIN — TRAZODONE HYDROCHLORIDE 50 MG: 50 TABLET ORAL at 02:09

## 2022-05-10 ASSESSMENT — PAIN SCALES - GENERAL
PAINLEVEL_OUTOF10: 8
PAINLEVEL_OUTOF10: 9
PAINLEVEL_OUTOF10: 8
PAINLEVEL_OUTOF10: 7

## 2022-05-10 ASSESSMENT — PAIN DESCRIPTION - LOCATION
LOCATION: LEG
LOCATION: LEG

## 2022-05-10 ASSESSMENT — PAIN DESCRIPTION - DESCRIPTORS: DESCRIPTORS: STABBING

## 2022-05-10 NOTE — PROGRESS NOTES
V2.0  [facility] Hospitalist Progress Note      Name:  Alice Bucio /Age/Sex: 1985  (39 y.o. male)   MRN & CSN:  [id number] & [id number] Encounter Date/Time: 5/10/2022 8:57 AM EDT    Location:  8040/5775-U PCP: Poonam Estrada, [address] [place] [address] Day: 3    Assessment and Plan:   ASSESSMENT & PLAN:  Alice Bucio is a 39 y.o.  male who presented to hospital complaint of right thigh pain, swelling, subjective fevers and minimal drain output. Patient underwent excision of a symptomatic soft tissue mass/cyst over the lateral thigh on 2022. CT demonstrated large complex collection centered along the lateral soft tissue of the right thigh. Collection measures approximately 3.4 x 8.5 x 18 cm in size with presence of gas in the collection. 1.  Large right lateral thigh fluid hematoma  2. Depression/anxiety, on trazodone Abilify, paroxetine  3. ADHD, on Ritalin     Plan  S/p Incision and drainage, washout and wound vac placement  Cont wound care  Continue Clindamycin. Continue Abilify and Paxil  Continue Lopid  Daily labs        Subjective:     Patient is seen laying comfortably in bed, not in distress. Communicating appropriately. Patient reports that he is waiting for a portable vacuum, through approval with his insurance company. Objective: Intake/Output Summary (Last 24 hours) at 5/10/2022 0857  Last data filed at 2022 2247  Gross per 24 hour   Intake 800 ml   Output 620 ml   Net 180 ml        Vitals:   Vitals:    05/10/22 0830   BP: 120/72   Pulse: 70   Resp: 16   Temp: 98.1 °F (36.7 °C)   SpO2:        Physical Exam:     General: NAD  Eyes: EOMI  ENT: neck supple  Cardiovascular: Regular rate. Respiratory: Clear to auscultation  Gastrointestinal: Soft, non tender  Genitourinary: no suprapubic tenderness  Musculoskeletal: Right lateral thigh wound inspected, dressing looks clean and intact. No edema  Skin: warm, dry  Neuro: Alert. Psych: Mood appropriate.      Medications: Medications:    sodium chloride flush  5-40 mL IntraVENous 2 times per day    clindamycin (CLEOCIN) IV  600 mg IntraVENous Q8H    ARIPiprazole  7.5 mg Oral Daily    gemfibrozil  600 mg Oral BID AC    PARoxetine  40 mg Oral Daily      Infusions:    sodium chloride 100 mL/hr at 05/09/22 2201     PRN Meds: HYDROcodone-acetaminophen, 1 tablet, Q4H PRN  sodium chloride flush, 5-40 mL, PRN  sodium chloride, , PRN  ondansetron, 4 mg, Q8H PRN   Or  ondansetron, 4 mg, Q6H PRN  polyethylene glycol, 17 g, Daily PRN  acetaminophen, 650 mg, Q6H PRN  traZODone, 50 mg, Nightly PRN        Labs      Recent Results (from the past 24 hour(s))   Culture, Surgical    Collection Time: 05/09/22 11:13 AM    Specimen: Leg; Specimen   Result Value Ref Range    Specimen LEG RT THIGH     Special Requests DRAIN SITE CULTURE    Basic Metabolic Panel w/ Reflex to MG    Collection Time: 05/09/22  6:34 PM   Result Value Ref Range    Sodium 138 135 - 145 MMOL/L    Potassium 4.4 3.5 - 5.1 MMOL/L    Chloride 104 99 - 110 mMol/L    CO2 23 21 - 32 MMOL/L    Anion Gap 11 4 - 16    BUN 16 6 - 23 MG/DL    CREATININE 1.0 0.9 - 1.3 MG/DL    Glucose 111 (H) 70 - 99 MG/DL    Calcium 8.5 8.3 - 10.6 MG/DL    GFR Non-African American >60 >60 mL/min/1.73m2    GFR African American >60 >60 mL/min/1.73m2   CBC with Auto Differential    Collection Time: 05/09/22  6:34 PM   Result Value Ref Range    WBC 2.8 (L) 4.0 - 10.5 K/CU MM    RBC 4.64 4.6 - 6.2 M/CU MM    Hemoglobin 14.5 13.5 - 18.0 GM/DL    Hematocrit 43.6 42 - 52 %    MCV 94.0 78 - 100 FL    MCH 31.3 (H) 27 - 31 PG    MCHC 33.3 32.0 - 36.0 %    RDW 11.8 11.7 - 14.9 %    Platelets 597 847 - 741 K/CU MM    MPV 9.4 7.5 - 11.1 FL    Differential Type AUTOMATED DIFFERENTIAL     Segs Relative 60.7 36 - 66 %    Lymphocytes % 17.7 (L) 24 - 44 %    Monocytes % 20.5 (H) 0 - 4 %    Eosinophils % 0.0 0 - 3 %    Basophils % 0.7 0 - 1 %    Segs Absolute 1.7 K/CU MM    Lymphocytes Absolute 0.5 K/CU MM    Monocytes Absolute 0.6 K/CU MM    Eosinophils Absolute 0.0 K/CU MM    Basophils Absolute 0.0 K/CU MM    Nucleated RBC % 0.0 %    Total Nucleated RBC 0.0 K/CU MM    Total Immature Neutrophil 0.01 K/CU MM    Immature Neutrophil % 0.4 0 - 0.43 %        Imaging/Diagnostics Last 24 Hours   CT FEMUR RIGHT W CONTRAST    Result Date: 5/8/2022  EXAMINATION: CT OF THE RIGHT FEMUR WITH CONTRAST 5/8/2022 6:37 pm TECHNIQUE: CT of the right femur was performed with the administration of intravenous contrast.  Multiplanar reformatted images are provided for review. Dose modulation, iterative reconstruction, and/or weight based adjustment of the mA/kV was utilized to reduce the radiation dose to as low as reasonably achievable. COMPARISON: MRI right femur April 8, 2022 HISTORY ORDERING SYSTEM PROVIDED HISTORY: Worsening operative site drainage and bleeding, concern for fluid collection or infection TECHNOLOGIST PROVIDED HISTORY: Reason for exam:->Worsening operative site drainage and bleeding, concern for fluid collection or infection Reason for Exam: Worsening operative site drainage and bleeding, concern for fluid collection or infection FINDINGS: Bones: No acute fracture identified. No suspicious lytic or sclerotic osseous lesions. No osteolysis or periosteal reaction evident. Soft Tissue: Postoperative changes noted along the right lateral thigh with complex fluid collection present containing gas. The collection measures approximately 3.4 x 8.5 x 18 cm (image 74 series 3; image 33 series 602). A surgical drain is centered within the complex collection. The collection is seen to extend along the superficial fascial plane of the anterior compartment musculature and corresponds to location of previously seen small crescentic fluid collection with internal solid nodularity on comparison MRI from April 8, 2022. No solid soft tissue density identified on today's exam. Joint: Anatomic alignment of the imaged right hip and right knee.   No significant knee effusion. 1. Large complex collection centered along the lateral soft tissues of the right thigh with surgical drain in place which is centered within the collection. The collection measures approximately 3.4 x 8.5 x 18 cm. Gas is present within the collection. Please note sterility of this fluid is indeterminate on imaging. Recommend correlation with fluid drainage from surgical drain. 2. No acute osseous findings.        Electronically signed by Abebe Cadena MD on 5/10/2022 at 8:57 AM

## 2022-05-10 NOTE — PROGRESS NOTES
GENERAL SURGERY PROGRESS NOTE                     Subjective:    NAD  Denies SOB, CP, f/c, n/v.     Objective:    Vitals: VITALS:  /72   Pulse 70   Temp 98.1 °F (36.7 °C) (Oral)   Resp 16   Ht 6' 2\" (1.88 m)   Wt 224 lb (101.6 kg)   SpO2 96%   BMI 28.76 kg/m²     I/O: 05/09 0701 - 05/10 0700  In: 800 [I.V.:800]  Out: 820 [Urine:800]    Labs/Imaging Results:   Lab Results   Component Value Date     05/09/2022    K 4.4 05/09/2022     05/09/2022    CO2 23 05/09/2022    BUN 16 05/09/2022    CREATININE 1.0 05/09/2022    GLUCOSE 111 05/09/2022    CALCIUM 8.5 05/09/2022      Lab Results   Component Value Date    WBC 2.6 (L) 05/10/2022    HGB 13.9 05/10/2022    HCT 41.3 (L) 05/10/2022    MCV 93.2 05/10/2022     05/10/2022          IV Fluids: sodium chloride Last Rate: 100 mL/hr at 05/09/22 2201    Scheduled Meds: sodium chloride flush, 5-40 mL, IntraVENous, 2 times per day    clindamycin (CLEOCIN) IV, 600 mg, IntraVENous, Q8H    ARIPiprazole, 7.5 mg, Oral, Daily    gemfibrozil, 600 mg, Oral, BID AC    PARoxetine, 40 mg, Oral, Daily    Physical Exam:  General: A&O x 3, no distress. HEENT: Anicteric sclerae, MMM. Extremities: No edema bilat LE. Abdomen: Soft, nontender, nondistended. RLE with wound vac in placed with good seal.       Assessment and Plan:     Patient Active Problem List:     Chronic low back pain     Bipolar 2 disorder (HCC)     Anxiety     ADHD     Depression     Postoperative complication of skin involving drainage from surgical wound      Principal Problem:    Postoperative complication of skin involving drainage from surgical wound      Ok for DC from surgery standpoint. Follow up with wound care. Has appointment 8om Thursday.             Ayaz Weaver DO

## 2022-05-10 NOTE — CARE COORDINATION
.Chart reviewed. The patient is from home with his girlfriend (who he states is mentally handicapped and gets SSI) and her father. He states he has applied for SSI but is not considered disabled. Pt has bi-polar, anxiety and depression,PTSD, neuropathy and degenerative back disease in disc. Still, he is told he can work by his doctor. The patient has a PCP and insurance and can afford and take his medications as prescribed. The patient has reliable transportation through his insurance company. The patient is independent in ADL's and states he is sleeping on a crib mattress on the floor or his one bedroom apartment. The patient needs a wound vac. Cornell called Neisha at [facility] and got a blank script and sent a PS to the doctor asking if he wanted to sign it or complete an e-script with [facility]. Cornell is waiting a call back. The doctor states he wants to do an e-script. Neisha at [facility] was given his e-mail so he can do the prescription that way. This Cm faxed facesheet, surgery notes, H&P, and hospitalist notes to [phone]. Cornell sent a referral to [facility] to follow for wound vac. Cornell is unsure if they accept the patient's insurance. Patient has a wound care appointment on Thursday at [address] [facility] and he states that transportation is an issue. He states that if he could get his wound care at the hospital before leaving, that would be helpful. Called the wound clinic @Phone: [phone] and they state that they can reschedule his appointment for next week if needed. CALL them if this is needed. He was following up with Dr. Geneva Hui or Dr. Jenifer Mims. CORNELL notes that Dr. Mindi Sylvester wants patient to follow up with him for the wound vac. Cornell is following. [address] does not take the patient's insurance.

## 2022-05-11 PROBLEM — S70.01XA HEMATOMA OF HIP, RIGHT, INITIAL ENCOUNTER: Status: ACTIVE | Noted: 2022-05-11

## 2022-05-11 LAB
ANION GAP SERPL CALCULATED.3IONS-SCNC: 9 MMOL/L (ref 4–16)
BASOPHILS ABSOLUTE: 0 K/CU MM
BASOPHILS RELATIVE PERCENT: 0.4 % (ref 0–1)
BUN BLDV-MCNC: 18 MG/DL (ref 6–23)
CALCIUM SERPL-MCNC: 8.1 MG/DL (ref 8.3–10.6)
CHLORIDE BLD-SCNC: 101 MMOL/L (ref 99–110)
CO2: 25 MMOL/L (ref 21–32)
CREAT SERPL-MCNC: 1 MG/DL (ref 0.9–1.3)
DIFFERENTIAL TYPE: ABNORMAL
EOSINOPHILS ABSOLUTE: 0.1 K/CU MM
EOSINOPHILS RELATIVE PERCENT: 1.8 % (ref 0–3)
GFR AFRICAN AMERICAN: >60 ML/MIN/1.73M2
GFR NON-AFRICAN AMERICAN: >60 ML/MIN/1.73M2
GLUCOSE BLD-MCNC: 97 MG/DL (ref 70–99)
HCT VFR BLD CALC: 42.4 % (ref 42–52)
HEMOGLOBIN: 14.1 GM/DL (ref 13.5–18)
IMMATURE NEUTROPHIL %: 0.4 % (ref 0–0.43)
LYMPHOCYTES ABSOLUTE: 1 K/CU MM
LYMPHOCYTES RELATIVE PERCENT: 36.2 % (ref 24–44)
MCH RBC QN AUTO: 30.9 PG (ref 27–31)
MCHC RBC AUTO-ENTMCNC: 33.3 % (ref 32–36)
MCV RBC AUTO: 92.8 FL (ref 78–100)
MONOCYTES ABSOLUTE: 0.5 K/CU MM
MONOCYTES RELATIVE PERCENT: 18.1 % (ref 0–4)
NUCLEATED RBC %: 0 %
PDW BLD-RTO: 11.9 % (ref 11.7–14.9)
PLATELET # BLD: 269 K/CU MM (ref 140–440)
PMV BLD AUTO: 9.3 FL (ref 7.5–11.1)
POTASSIUM SERPL-SCNC: 4.3 MMOL/L (ref 3.5–5.1)
RBC # BLD: 4.57 M/CU MM (ref 4.6–6.2)
SEGMENTED NEUTROPHILS ABSOLUTE COUNT: 1.2 K/CU MM
SEGMENTED NEUTROPHILS RELATIVE PERCENT: 43.1 % (ref 36–66)
SODIUM BLD-SCNC: 135 MMOL/L (ref 135–145)
TOTAL IMMATURE NEUTOROPHIL: 0.01 K/CU MM
TOTAL NUCLEATED RBC: 0 K/CU MM
WBC # BLD: 2.7 K/CU MM (ref 4–10.5)

## 2022-05-11 PROCEDURE — 1200000000 HC SEMI PRIVATE

## 2022-05-11 PROCEDURE — 97606 NEG PRS WND THER DME>50 SQCM: CPT

## 2022-05-11 PROCEDURE — 36415 COLL VENOUS BLD VENIPUNCTURE: CPT

## 2022-05-11 PROCEDURE — 2500000003 HC RX 250 WO HCPCS: Performed by: SURGERY

## 2022-05-11 PROCEDURE — 97605 NEG PRS WND THER DME<=50SQCM: CPT

## 2022-05-11 PROCEDURE — 94761 N-INVAS EAR/PLS OXIMETRY MLT: CPT

## 2022-05-11 PROCEDURE — 6370000000 HC RX 637 (ALT 250 FOR IP): Performed by: SURGERY

## 2022-05-11 PROCEDURE — 85025 COMPLETE CBC W/AUTO DIFF WBC: CPT

## 2022-05-11 PROCEDURE — 2580000003 HC RX 258: Performed by: SURGERY

## 2022-05-11 PROCEDURE — 80048 BASIC METABOLIC PNL TOTAL CA: CPT

## 2022-05-11 RX ADMIN — HYDROCODONE BITARTRATE AND ACETAMINOPHEN 1 TABLET: 5; 325 TABLET ORAL at 04:07

## 2022-05-11 RX ADMIN — HYDROCODONE BITARTRATE AND ACETAMINOPHEN 1 TABLET: 5; 325 TABLET ORAL at 14:33

## 2022-05-11 RX ADMIN — ONDANSETRON 4 MG: 4 TABLET, ORALLY DISINTEGRATING ORAL at 07:36

## 2022-05-11 RX ADMIN — PAROXETINE HYDROCHLORIDE 40 MG: 20 TABLET, FILM COATED ORAL at 09:06

## 2022-05-11 RX ADMIN — SODIUM CHLORIDE 250 ML: 9 INJECTION, SOLUTION INTRAVENOUS at 16:03

## 2022-05-11 RX ADMIN — GEMFIBROZIL 600 MG: 600 TABLET, FILM COATED ORAL at 15:52

## 2022-05-11 RX ADMIN — SODIUM CHLORIDE, PRESERVATIVE FREE 10 ML: 5 INJECTION INTRAVENOUS at 09:06

## 2022-05-11 RX ADMIN — ONDANSETRON 4 MG: 4 TABLET, ORALLY DISINTEGRATING ORAL at 15:52

## 2022-05-11 RX ADMIN — CLINDAMYCIN IN 5 PERCENT DEXTROSE 600 MG: 12 INJECTION, SOLUTION INTRAVENOUS at 06:09

## 2022-05-11 RX ADMIN — CLINDAMYCIN IN 5 PERCENT DEXTROSE 600 MG: 12 INJECTION, SOLUTION INTRAVENOUS at 22:13

## 2022-05-11 RX ADMIN — HYDROCODONE BITARTRATE AND ACETAMINOPHEN 1 TABLET: 5; 325 TABLET ORAL at 22:05

## 2022-05-11 RX ADMIN — ARIPIPRAZOLE 7.5 MG: 5 TABLET ORAL at 09:06

## 2022-05-11 RX ADMIN — GEMFIBROZIL 600 MG: 600 TABLET, FILM COATED ORAL at 06:09

## 2022-05-11 RX ADMIN — SODIUM CHLORIDE, PRESERVATIVE FREE 10 ML: 5 INJECTION INTRAVENOUS at 22:05

## 2022-05-11 RX ADMIN — SODIUM CHLORIDE 25 ML: 9 INJECTION, SOLUTION INTRAVENOUS at 06:08

## 2022-05-11 RX ADMIN — CLINDAMYCIN IN 5 PERCENT DEXTROSE 600 MG: 12 INJECTION, SOLUTION INTRAVENOUS at 16:04

## 2022-05-11 RX ADMIN — SODIUM CHLORIDE: 9 INJECTION, SOLUTION INTRAVENOUS at 22:11

## 2022-05-11 RX ADMIN — HYDROCODONE BITARTRATE AND ACETAMINOPHEN 1 TABLET: 5; 325 TABLET ORAL at 09:05

## 2022-05-11 ASSESSMENT — PAIN SCALES - GENERAL
PAINLEVEL_OUTOF10: 10
PAINLEVEL_OUTOF10: 9
PAINLEVEL_OUTOF10: 9
PAINLEVEL_OUTOF10: 10
PAINLEVEL_OUTOF10: 10

## 2022-05-11 NOTE — PROGRESS NOTES
V2.0  [facility] [place]      Name:  Shari Gonzalez /Age/Sex: 1985  (39 y.o. male)   MRN & CSN:  [id number] & [id number] Encounter Date/Time: 2022 8:57 AM EDT    Location:  [address] [place], [place] [id number] PCP: Obdulia Pappas, [address] [place] [address] Day: 4    Assessment and Plan:   ASSESSMENT & PLAN:  Shari Gonzalez is a 39 y.o.  male who presented to hospital complaint of right thigh pain, swelling, subjective fevers and minimal drain output. Patient underwent excision of a symptomatic soft tissue mass/cyst over the lateral thigh on 2022. CT demonstrated large complex collection centered along the lateral soft tissue of the right thigh. Collection measures approximately 3.4 x 8.5 x 18 cm in size with presence of gas in the collection. 1.  Large right lateral thigh fluid hematoma  2. Depression/anxiety, on trazodone Abilify, paroxetine  3. ADHD, on Ritalin     Plan  S/p Incision and drainage, washout and wound vac placement. Surgery has cleared patient. Waiting for portable wound vac approval for discharge. Cont wound care  Continue Clindamycin. Continue Abilify and Paxil  Continue Lopid  Daily labs        Subjective:     Patient is seen laying comfortably in bed, not in distress. Communicating appropriately. Patient reports that he is waiting for a portable vacuum, through approval with his insurance company. Objective: Intake/Output Summary (Last 24 hours) at 2022 1424  Last data filed at 2022 0832  Gross per 24 hour   Intake --   Output 300 ml   Net -300 ml        Vitals:   Vitals:    22 1204   BP:    Pulse:    Resp: 16   Temp:    SpO2: 95%       Physical Exam:     General: NAD  Eyes: EOMI  ENT: neck supple  Cardiovascular: Regular rate. Respiratory: Clear to auscultation  Gastrointestinal: Soft, non tender  Genitourinary: no suprapubic tenderness  Musculoskeletal: Right lateral thigh wound inspected, dressing looks clean and intact.   No edema  Skin: warm, dry  Neuro: Alert. Psych: Mood appropriate.      Medications:   Medications:    sodium chloride flush  5-40 mL IntraVENous 2 times per day    clindamycin (CLEOCIN) IV  600 mg IntraVENous Q8H    ARIPiprazole  7.5 mg Oral Daily    gemfibrozil  600 mg Oral BID AC    PARoxetine  40 mg Oral Daily      Infusions:    sodium chloride 25 mL (05/11/22 0608)     PRN Meds: HYDROcodone-acetaminophen, 1 tablet, Q4H PRN  sodium chloride flush, 5-40 mL, PRN  sodium chloride, , PRN  ondansetron, 4 mg, Q8H PRN   Or  ondansetron, 4 mg, Q6H PRN  polyethylene glycol, 17 g, Daily PRN  acetaminophen, 650 mg, Q6H PRN  traZODone, 50 mg, Nightly PRN        Labs      Recent Results (from the past 24 hour(s))   Basic Metabolic Panel w/ Reflex to MG    Collection Time: 05/11/22  7:38 AM   Result Value Ref Range    Sodium 135 135 - 145 MMOL/L    Potassium 4.3 3.5 - 5.1 MMOL/L    Chloride 101 99 - 110 mMol/L    CO2 25 21 - 32 MMOL/L    Anion Gap 9 4 - 16    BUN 18 6 - 23 MG/DL    CREATININE 1.0 0.9 - 1.3 MG/DL    Glucose 97 70 - 99 MG/DL    Calcium 8.1 (L) 8.3 - 10.6 MG/DL    GFR Non-African American >60 >60 mL/min/1.73m2    GFR African American >60 >60 mL/min/1.73m2   CBC with Auto Differential    Collection Time: 05/11/22  7:38 AM   Result Value Ref Range    WBC 2.7 (L) 4.0 - 10.5 K/CU MM    RBC 4.57 (L) 4.6 - 6.2 M/CU MM    Hemoglobin 14.1 13.5 - 18.0 GM/DL    Hematocrit 42.4 42 - 52 %    MCV 92.8 78 - 100 FL    MCH 30.9 27 - 31 PG    MCHC 33.3 32.0 - 36.0 %    RDW 11.9 11.7 - 14.9 %    Platelets 844 489 - 953 K/CU MM    MPV 9.3 7.5 - 11.1 FL    Differential Type AUTOMATED DIFFERENTIAL     Segs Relative 43.1 36 - 66 %    Lymphocytes % 36.2 24 - 44 %    Monocytes % 18.1 (H) 0 - 4 %    Eosinophils % 1.8 0 - 3 %    Basophils % 0.4 0 - 1 %    Segs Absolute 1.2 K/CU MM    Lymphocytes Absolute 1.0 K/CU MM    Monocytes Absolute 0.5 K/CU MM    Eosinophils Absolute 0.1 K/CU MM    Basophils Absolute 0.0 K/CU MM    Nucleated RBC % 0.0 %    Total Nucleated RBC 0.0 K/CU MM    Total Immature Neutrophil 0.01 K/CU MM    Immature Neutrophil % 0.4 0 - 0.43 %        Imaging/Diagnostics Last 24 Hours   CT FEMUR RIGHT W CONTRAST    Result Date: 5/8/2022  EXAMINATION: CT OF THE RIGHT FEMUR WITH CONTRAST 5/8/2022 6:37 pm TECHNIQUE: CT of the right femur was performed with the administration of intravenous contrast.  Multiplanar reformatted images are provided for review. Dose modulation, iterative reconstruction, and/or weight based adjustment of the mA/kV was utilized to reduce the radiation dose to as low as reasonably achievable. COMPARISON: MRI right femur April 8, 2022 HISTORY ORDERING SYSTEM PROVIDED HISTORY: Worsening operative site drainage and bleeding, concern for fluid collection or infection TECHNOLOGIST PROVIDED HISTORY: Reason for exam:->Worsening operative site drainage and bleeding, concern for fluid collection or infection Reason for Exam: Worsening operative site drainage and bleeding, concern for fluid collection or infection FINDINGS: Bones: No acute fracture identified. No suspicious lytic or sclerotic osseous lesions. No osteolysis or periosteal reaction evident. Soft Tissue: Postoperative changes noted along the right lateral thigh with complex fluid collection present containing gas. The collection measures approximately 3.4 x 8.5 x 18 cm (image 74 series 3; image 33 series 602). A surgical drain is centered within the complex collection. The collection is seen to extend along the superficial fascial plane of the anterior compartment musculature and corresponds to location of previously seen small crescentic fluid collection with internal solid nodularity on comparison MRI from April 8, 2022. No solid soft tissue density identified on today's exam. Joint: Anatomic alignment of the imaged right hip and right knee. No significant knee effusion.      1. Large complex collection centered along the lateral soft tissues of the right thigh with surgical drain in place which is centered within the collection. The collection measures approximately 3.4 x 8.5 x 18 cm. Gas is present within the collection. Please note sterility of this fluid is indeterminate on imaging. Recommend correlation with fluid drainage from surgical drain. 2. No acute osseous findings.        Electronically signed by Sherry Arredondo MD on 5/11/2022 at 2:24 PM

## 2022-05-11 NOTE — CONSULTS
[place] [place] [id number] Continence Nurse  Consult Note       Camelia Bumpers  AGE: 39 y.o. GENDER: male  : 1985  TODAY'S DATE:  2022    Subjective:     Reason for  Evaluation and Assessment: NPWT dressing change rt thigh.       Camelia Bumpers is a 39 y.o. 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     L5, S1    Depression     Hyperlipidemia     Neuropathy 2020    Seizures ([facility] [address]        PAST SURGICAL HISTORY    Past Surgical History:   Procedure Laterality Date    EYE SURGERY      for amblyopia    LEG SURGERY Right 2022    RIGHT THIGH DEBRIDEMENT INCISION AND DRAINAGE AND WASHOUT AND WOUND VAC PLACEMENT performed by Lázaro Lam DO at [address] [place] [address] Right 2022    RIGHT LATERAL THIGH LESION BIOPSY EXCISION performed by Lázaro Lam DO at [place] [address]      TYMPANOSTOMY TUBE PLACEMENT         FAMILY HISTORY    Family History   Problem Relation Age of Onset    Ovarian Cancer Mother     Bipolar Disorder Mother     Lung Cancer Father     Other Brother         cerebral palsy    Diabetes Maternal Grandmother     Diabetes Paternal Grandmother        SOCIAL HISTORY    Social History     Tobacco Use    Smoking status: Former Smoker    Smokeless tobacco: Never Used   Vaping Use    Vaping Use: Never used   Substance Use Topics    Alcohol use: Not Currently     Comment: occasional    Drug use: Not Currently     Types: Marijuana (Weed)     Comment: in the past- marijuana Many years ago       ALLERGIES    Allergies   Allergen Reactions    Amoxicillin Diarrhea       MEDICATIONS    No current facility-administered medications on file prior to encounter. Current Outpatient Medications on File Prior to Encounter   Medication Sig Dispense Refill    ARIPiprazole (ABILIFY) 5 MG tablet Take 7.5 mg by mouth daily      PARoxetine (PAXIL) 40 MG tablet Take 40 mg by mouth daily      HYDROcodone-acetaminophen (NORCO) 5-325 MG per tablet Take 1 tablet by mouth every 4 hours as needed for Pain for up to 7 days. Intended supply: 3 days.  Take lowest dose possible to manage pain 30 tablet 0    gemfibrozil (LOPID) 600 MG tablet Take 1 tablet by mouth 2 times daily 60 tablet 2    traZODone (DESYREL) 50 MG tablet nightly PRN      methylphenidate (RITALIN) 20 MG tablet Take one and one-half a day           Objective:      /71   Pulse 57   Temp 98.6 °F (37 °C) (Oral)   Resp 16   Ht 6' 2\" (1.88 m)   Wt 224 lb (101.6 kg)   SpO2 96%   BMI 28.76 kg/m²   Avinash Risk Score: Avinash Scale Score: 20    LABS    CBC:   Lab Results   Component Value Date    WBC 2.7 05/11/2022    RBC 4.57 05/11/2022    HGB 14.1 05/11/2022    HCT 42.4 05/11/2022    MCV 92.8 05/11/2022    MCH 30.9 05/11/2022    MCHC 33.3 05/11/2022    RDW 11.9 05/11/2022     05/11/2022    MPV 9.3 05/11/2022     CMP:    Lab Results   Component Value Date     05/11/2022    K 4.3 05/11/2022     05/11/2022    CO2 25 05/11/2022    BUN 18 05/11/2022    CREATININE 1.0 05/11/2022    GFRAA >60 05/11/2022    LABGLOM >60 05/11/2022    GLUCOSE 97 05/11/2022    PROT 6.3 05/04/2022    LABALBU 4.4 05/04/2022    CALCIUM 8.1 05/11/2022    BILITOT 0.8 05/04/2022    ALKPHOS 86 05/04/2022    AST 20 05/04/2022    ALT 37 05/04/2022     Albumin:    Lab Results   Component Value Date    LABALBU 4.4 05/04/2022     PT/INR:    Lab Results   Component Value Date    PROTIME 11.6 05/08/2022    INR 0.90 05/08/2022     HgBA1c:  No results found for: LABA1C      Assessment:     Patient Active Problem List   Diagnosis    Chronic low back pain    B[facility] 2 disorder ([facility] [address]    Anxiety    ADHD    Depression    Postoperative complication of skin involving drainage from surgical wound    Hematoma of hip, right, initial encounter       Measurements:  Negative Pressure Wound Therapy Leg Anterior;Right;Upper (Active)   Wound Type Surgical 05/11/22 1018   Unit Type kci 05/11/22 1018   Dressing Type Black Foam;White Foam 05/11/22 1018   Number of pieces used 4 05/09/22 2241   Number of pieces removed 2 05/11/22 1018   Cycle Continuous 05/11/22 1018   Target Pressure (mmHg) 125 05/11/22 1018   Canister changed?  No 05/11/22 1018   Dressing Status New dressing applied 05/11/22 1018   Dressing Changed Changed/New 05/11/22 1018   Drainage Amount Moderate 05/11/22 1018   Drainage Description Serosanguinous 05/11/22 1018   Dressing Change Due 05/13/22 05/11/22 1018   Wound Assessment Pink;Red 05/11/22 1018   Odor None 05/11/22 1018   Number of days: 2       Wound 05/11/22 Thigh Anterior;Right (Active)   Wound Image   05/11/22 1018   Wound Etiology Non-Healing Surgical 05/11/22 1018   Dressing Status New dressing applied 05/11/22 1018   Wound Cleansed Cleansed with saline 05/11/22 1018   Dressing/Treatment Negative pressure wound therapy 05/11/22 1018   Wound Length (cm) 11.5 cm 05/11/22 1018   Wound Width (cm) 6 cm 05/11/22 1018   Wound Depth (cm) 3 cm 05/11/22 1018   Wound Surface Area (cm^2) 69 cm^2 05/11/22 1018   Wound Volume (cm^3) 207 cm^3 05/11/22 1018   Distance Tunneling (cm) 0 cm 05/11/22 1018   Tunneling Position ___ O'Clock 0 05/11/22 1018   Undermining Starts ___ O'Clock 12 05/11/22 1018   Undermining Ends___ O'Clock 12 05/11/22 1018   Undermining Maxium Distance (cm) 5 05/11/22 1018   Wound Assessment Pink/red;Subcutaneous 05/11/22 1018   Drainage Amount Moderate 05/11/22 1018   Drainage Description Serosanguinous 05/11/22 1018   Odor None 05/11/22 1018   Fatoumata-wound Assessment Intact 05/11/22 1018   Margins Defined edges 05/11/22 1018   Wound Thickness Description not for Pressure Injury Full thickness 05/11/22 1018 Number of days: 0       Response to treatment:  Poorly tolerated by patient. Pain Assessment:  Severity:  severe  Quality of pain: sore/sharp  Wound Pain Timing/Severity: dressing change   Premedicated: yes     Plan:     Plan of Care: Wound 05/11/22 Thigh Anterior;Right-Dressing/Treatment: Negative pressure wound therapy (mastisol/duoderm, white foam x 1 black foam x 1 )     Patient in bed agreeable to wound care to change vac dressing to rt thigh. Pt had tissue mass/hematoma  Removed. Pt has medicated for pain. Dressing removed black foam x 2 pieces with significant undermining. Staples were connected to foam. Perfect served Dr Melia vazquez to remove staples. Removed 4 staples. Pt was yelling in pain was better after dressing removed. Cleansed with NS, measured and pictured. Wound with subqutaneous/muscle  Tissue. Applied new vac dressing with white foam x 1 piece and black foam x 1 piece. Adequate seal obtained @ 125mmhg continuous. Wound care to change again on Friday. Pt is generally not at risk for skin breakdown AEB aftab. Specialty Bed Required :  no  [] Low Air Loss   [] Pressure Redistribution  [] Fluid Immersion  [] Bariatric  [] Total Pressure Relief  [] Other:     Discharge Plan:  Placement for patient upon discharge: home  Hospice Care: no  Patient appropriate for Outpatient [address]: [facility]    Patient/Caregiver Teaching:  Level of patient/caregiver understanding able to:   Pt voiced understanding. Electronically signed by Miguelangel Salvador RN,  on 5/11/2022 at 11:56 AM

## 2022-05-11 NOTE — CARE COORDINATION
CORNELL spoke with Neisha at [facility], she has all documentation needed to complete wound vac approval. She states the approval should come in the next hour. She will call Cm when approval is done. Cm called [facility] to see if they can accept the patient left VM for admissions and faxed referral to [phone]. CM also called [facility] and they are out of network for this patients insurance. Cm called [facility] and they are out of network for the patient's insurance. Cm faxed [facility]. CM updated wound vac is approved #[id number]. CM called [employer] [id number] and they will deliver. 12  Cm sent a referral to [facility] phone [phone] fax [phone] Cm also sent a referral to [facility]. Cm updated that [facility] does not take the patients insurance. [place], [place], [facility] and [facility] also take the patients insurance. 36  [place] does not have the staff to take the patient. So They denied. They state that they might be able to take the patient if he need IV antx. But would have to call them again. Cornell has not heard back from [facility] or [facility].     Cm faxed referral to [facility]

## 2022-05-11 NOTE — PROGRESS NOTES
Per Dr. Raymon Grissom, pt can be discharged and is to follow up with the wound care clinic tomorrow.

## 2022-05-12 ENCOUNTER — HOSPITAL ENCOUNTER (OUTPATIENT)
Dept: WOUND CARE | Age: 37
Discharge: HOME | End: 2022-05-12

## 2022-05-12 VITALS
DIASTOLIC BLOOD PRESSURE: 81 MMHG | HEART RATE: 67 BPM | HEIGHT: 74 IN | OXYGEN SATURATION: 96 % | WEIGHT: 224 LBS | TEMPERATURE: 98.4 F | BODY MASS INDEX: 28.75 KG/M2 | RESPIRATION RATE: 18 BRPM | SYSTOLIC BLOOD PRESSURE: 121 MMHG

## 2022-05-12 PROCEDURE — 94761 N-INVAS EAR/PLS OXIMETRY MLT: CPT

## 2022-05-12 PROCEDURE — 6370000000 HC RX 637 (ALT 250 FOR IP): Performed by: SURGERY

## 2022-05-12 PROCEDURE — 2580000003 HC RX 258: Performed by: SURGERY

## 2022-05-12 PROCEDURE — 2500000003 HC RX 250 WO HCPCS: Performed by: SURGERY

## 2022-05-12 RX ORDER — HYDROCODONE BITARTRATE AND ACETAMINOPHEN 5; 325 MG/1; MG/1
1 TABLET ORAL EVERY 4 HOURS PRN
Qty: 20 TABLET | Refills: 0 | Status: SHIPPED | OUTPATIENT
Start: 2022-05-12 | End: 2022-05-17

## 2022-05-12 RX ADMIN — GEMFIBROZIL 600 MG: 600 TABLET, FILM COATED ORAL at 06:33

## 2022-05-12 RX ADMIN — SODIUM CHLORIDE, PRESERVATIVE FREE 10 ML: 5 INJECTION INTRAVENOUS at 03:18

## 2022-05-12 RX ADMIN — CLINDAMYCIN IN 5 PERCENT DEXTROSE 600 MG: 12 INJECTION, SOLUTION INTRAVENOUS at 06:37

## 2022-05-12 RX ADMIN — SODIUM CHLORIDE, PRESERVATIVE FREE 10 ML: 5 INJECTION INTRAVENOUS at 09:03

## 2022-05-12 RX ADMIN — CLINDAMYCIN IN 5 PERCENT DEXTROSE 600 MG: 12 INJECTION, SOLUTION INTRAVENOUS at 14:21

## 2022-05-12 RX ADMIN — SODIUM CHLORIDE 25 ML: 9 INJECTION, SOLUTION INTRAVENOUS at 14:20

## 2022-05-12 RX ADMIN — PAROXETINE HYDROCHLORIDE 40 MG: 20 TABLET, FILM COATED ORAL at 09:03

## 2022-05-12 RX ADMIN — HYDROCODONE BITARTRATE AND ACETAMINOPHEN 1 TABLET: 5; 325 TABLET ORAL at 03:19

## 2022-05-12 RX ADMIN — HYDROCODONE BITARTRATE AND ACETAMINOPHEN 1 TABLET: 5; 325 TABLET ORAL at 09:04

## 2022-05-12 RX ADMIN — SODIUM CHLORIDE: 9 INJECTION, SOLUTION INTRAVENOUS at 06:35

## 2022-05-12 RX ADMIN — ARIPIPRAZOLE 7.5 MG: 5 TABLET ORAL at 09:06

## 2022-05-12 ASSESSMENT — PAIN DESCRIPTION - ORIENTATION
ORIENTATION: RIGHT
ORIENTATION: RIGHT

## 2022-05-12 ASSESSMENT — PAIN DESCRIPTION - PAIN TYPE
TYPE: ACUTE PAIN
TYPE: ACUTE PAIN

## 2022-05-12 ASSESSMENT — PAIN DESCRIPTION - ONSET
ONSET: ON-GOING
ONSET: ON-GOING

## 2022-05-12 ASSESSMENT — PAIN DESCRIPTION - LOCATION
LOCATION: LEG
LOCATION: LEG

## 2022-05-12 ASSESSMENT — PAIN SCALES - GENERAL
PAINLEVEL_OUTOF10: 5
PAINLEVEL_OUTOF10: 7
PAINLEVEL_OUTOF10: 7
PAINLEVEL_OUTOF10: 0
PAINLEVEL_OUTOF10: 7

## 2022-05-12 ASSESSMENT — PAIN DESCRIPTION - DESCRIPTORS
DESCRIPTORS: ACHING;THROBBING;STABBING
DESCRIPTORS: ACHING;THROBBING

## 2022-05-12 ASSESSMENT — PAIN DESCRIPTION - FREQUENCY
FREQUENCY: CONTINUOUS
FREQUENCY: CONTINUOUS

## 2022-05-12 NOTE — PROGRESS NOTES
WOUND VAC EXCHANGED TO PATIENTS OWN VAC. PATIENT SHOWED AND DEMONSTRATED HOW TO CHANGE THE CANISTER AND HOW TO TROUBLE SHOOT AND CHARGE THE MACHINE. PATIENT VOICES UNDERSTANDING AND ALSO RETURNED DEMONSTRATION. EDUCATED ON INFECTION CONTROL AND HAND WASHING.

## 2022-05-12 NOTE — DISCHARGE SUMMARY
Discharge Summary    Name:  Shanae Romero /Age/Sex: 1985  (39 y.o. male)   MRN & CSN:  [id number] & [id number] Admission Date/Time: 2022  3:17 PM   Attending:  Obey Camarena MD Discharging Physician: Obey Camarena MD     Hospital Course:   Shanae Romero is a 39 y.o.  male  who presents with postoperative hematoma of the left thigh with minimal output from drain placed after initial surgery. Patient underwent excision of a symptomatic soft tissue mass/cyst over the lateral thigh on 2022. CT on admission demonstrated large complex collection centered along the lateral soft tissue of the right thigh.  Collection measures approximately 3.4 x 8.5 x 18 cm in size with presence of gas in the collection. 1.  Large right lateral thigh fluid hematoma  2.  Depression/anxiety, on trazodone Abilify, paroxetine  3.  ADHD, on Ritalin     Plan  Pt re-evaluated by surgeon and was taken to the O.R for Incision and drainage, washout and wound vac placement. Surgery has cleared patient for discharge. He will be going home with a wound vac and home health services. Cont wound care  Completed course of emperic IV antibiotics. No bacterial isolates. Continue home meds.        The patient expressed appropriate understanding of and agreement with the discharge recommendations, medications, and plan. Consults this admission:  [address] TO HOSPITALIST  IP CONSULT TO CASE MANAGEMENT      Discharge Instruction:   Handoff to PCP:     Follow up appointments: as scheduled  Primary care physician: in 1-2 wks    Activity: as tolerated.    Disposition: Discharged to:   []Home, [x]C, []SNF, []Acute Rehab, []Hospice   Condition on discharge: Stable    Discharge Medications:        Medication List      CHANGE how you take these medications    * HYDROcodone-acetaminophen 5-325 MG per tablet  Commonly known as: Norco  Take 1 tablet by mouth every 4 hours as needed for Pain for up to 7 days. Intended supply: 3 days. Take lowest dose possible to manage pain  What changed: Another medication with the same name was added. Make sure you understand how and when to take each. * HYDROcodone-acetaminophen 5-325 MG per tablet  Commonly known as: NORCO  Take 1 tablet by mouth every 4 hours as needed for Pain for up to 5 days. What changed: You were already taking a medication with the same name, and this prescription was added. Make sure you understand how and when to take each. * This list has 2 medication(s) that are the same as other medications prescribed for you. Read the directions carefully, and ask your doctor or other care provider to review them with you. CONTINUE taking these medications    ARIPiprazole 5 MG tablet  Commonly known as: ABILIFY     gemfibrozil 600 MG tablet  Commonly known as: Lopid  Take 1 tablet by mouth 2 times daily     PARoxetine 40 MG tablet  Commonly known as: PAXIL     Ritalin 20 MG tablet  Generic drug: methylphenidate     traZODone 50 MG tablet  Commonly known as: DESYREL           Where to Get Your Medications      You can get these medications from any pharmacy    Bring a paper prescription for each of these medications  HYDROcodone-acetaminophen 5-325 MG per tablet         Objective Findings at Discharge:       BMP/CBC  Recent Labs     05/09/22  1834 05/10/22  1028 05/11/22  0738    138 135   K 4.4 3.9 4.3    103 101   CO2 23 24 25   BUN 16 19 18   CREATININE 1.0 1.0 1.0   WBC 2.8* 2.6* 2.7*   HCT 43.6 41.3* 42.4    272 269           Additional Information: Patient seen and examined day of discharge.  For more information regarding patient's care please contact [facility] records [phone]    Discharge Time of 35 minutes    Electronically signed by Sammy Vega MD on 5/12/2022 at 1:28 PM

## 2022-05-13 LAB
CULTURE: NORMAL
CULTURE: NORMAL
Lab: NORMAL
Lab: NORMAL
SPECIMEN: NORMAL
SPECIMEN: NORMAL

## 2022-05-15 LAB
CULTURE: NORMAL
Lab: NORMAL
SPECIMEN: NORMAL

## 2022-05-18 ENCOUNTER — HOSPITAL ENCOUNTER (OUTPATIENT)
Dept: WOUND CARE | Age: 37
Discharge: HOME | End: 2022-05-18
Payer: MEDICARE

## 2022-05-18 DIAGNOSIS — S71.001A COMPLICATED OPEN WOUND OF RIGHT HIP, INITIAL ENCOUNTER: ICD-10-CM

## 2022-05-18 DIAGNOSIS — T81.89XA NON-HEALING SURGICAL WOUND, INITIAL ENCOUNTER: ICD-10-CM

## 2022-05-18 PROCEDURE — 97605 NEG PRS WND THER DME<=50SQCM: CPT

## 2022-05-18 PROCEDURE — 99203 OFFICE O/P NEW LOW 30 MIN: CPT | Performed by: NURSE PRACTITIONER

## 2022-05-18 PROCEDURE — 99213 OFFICE O/P EST LOW 20 MIN: CPT

## 2022-05-18 RX ORDER — GENTAMICIN SULFATE 1 MG/G
OINTMENT TOPICAL ONCE
Status: CANCELLED | OUTPATIENT
Start: 2022-05-18 | End: 2022-05-18

## 2022-05-18 RX ORDER — BETAMETHASONE DIPROPIONATE 0.05 %
OINTMENT (GRAM) TOPICAL ONCE
Status: CANCELLED | OUTPATIENT
Start: 2022-05-18 | End: 2022-05-18

## 2022-05-18 RX ORDER — LIDOCAINE 40 MG/G
CREAM TOPICAL ONCE
Status: CANCELLED | OUTPATIENT
Start: 2022-05-18 | End: 2022-05-18

## 2022-05-18 RX ORDER — LIDOCAINE HYDROCHLORIDE 20 MG/ML
JELLY TOPICAL ONCE
Status: CANCELLED | OUTPATIENT
Start: 2022-05-18 | End: 2022-05-18

## 2022-05-18 RX ORDER — CLOBETASOL PROPIONATE 0.5 MG/G
OINTMENT TOPICAL ONCE
Status: CANCELLED | OUTPATIENT
Start: 2022-05-18 | End: 2022-05-18

## 2022-05-18 RX ORDER — HYDROCODONE BITARTRATE AND ACETAMINOPHEN 5; 325 MG/1; MG/1
1 TABLET ORAL EVERY 12 HOURS PRN
Qty: 14 TABLET | Refills: 0 | Status: SHIPPED | OUTPATIENT
Start: 2022-05-18 | End: 2022-05-22

## 2022-05-18 RX ORDER — LIDOCAINE HYDROCHLORIDE 40 MG/ML
SOLUTION TOPICAL ONCE
Status: CANCELLED | OUTPATIENT
Start: 2022-05-18 | End: 2022-05-18

## 2022-05-18 RX ORDER — BACITRACIN, NEOMYCIN, POLYMYXIN B 400; 3.5; 5 [USP'U]/G; MG/G; [USP'U]/G
OINTMENT TOPICAL ONCE
Status: CANCELLED | OUTPATIENT
Start: 2022-05-18 | End: 2022-05-18

## 2022-05-18 RX ORDER — LIDOCAINE 50 MG/G
OINTMENT TOPICAL ONCE
Status: CANCELLED | OUTPATIENT
Start: 2022-05-18 | End: 2022-05-18

## 2022-05-18 RX ORDER — GINSENG 100 MG
CAPSULE ORAL ONCE
Status: CANCELLED | OUTPATIENT
Start: 2022-05-18 | End: 2022-05-18

## 2022-05-18 RX ORDER — BACITRACIN ZINC AND POLYMYXIN B SULFATE 500; 1000 [USP'U]/G; [USP'U]/G
OINTMENT TOPICAL ONCE
Status: CANCELLED | OUTPATIENT
Start: 2022-05-18 | End: 2022-05-18

## 2022-05-18 NOTE — PROGRESS NOTES
[facility] Initial Visit      Geeta Finch  AGE: 39 y.o. GENDER: male  : 1985  EPISODE DATE:  2022   Referred by: Surgery Team    Subjective:     CHIEF COMPLAINT nonhealing surgical wound to right hip due to dog bite last summer     HISTORY of PRESENT ILLNESS      Geeta Finch is a 39 y.o. male who presents to the [address] [place] [address] for an initial visit for evaluation and treatment of Chronic non-healing surgical and traumatic  ulcer(s) of  Right upper lateral leg / right hip. The condition is of moderate severity. The ulcer has been present for 2 weeks, but this issue started last July when he was bitten by a dog. The underlying cause is thought to be trauma and nonhealing surgical.  The patients care to date has included JEFFERSON drain, which became infected. The patient has significant underlying medical conditions as below. Patient has been dealing with this since last summer. He originally was bitten by a dig and this turned into a large complicated hematoma. This was drained with a JEFFERSON, but the region became infected and he needed a large I&D. A NPT was placed. Wound is clean and dry, but is draining a moderate amount of serosanguinous drainage.  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     L5, S1    Depression     Hyperlipidemia     Neuropathy 2020    Seizures ([address]        PAST SURGICAL HISTORY    Past Surgical History:   Procedure Laterality Date    EYE SURGERY      for amblyopia    LEG SURGERY Right 2022    RIGHT THIGH DEBRIDEMENT INCISION AND DRAINAGE AND WASHOUT AND WOUND VAC PLACEMENT performed by Alissa Reeder DO at [address] [facility] Right 5/5/2022    RIGHT LATERAL THIGH LESION BIOPSY EXCISION performed by Alissa Reeder DO at [place] [address]      TYMPANOSTOMY TUBE PLACEMENT         FAMILY HISTORY    Family History   Problem Relation Age of Onset    Ovarian Cancer Mother     Bipolar Disorder Mother     Lung Cancer Father     Other Brother         cerebral palsy    Diabetes Maternal Grandmother     Diabetes Paternal Grandmother        SOCIAL HISTORY    Social History     Tobacco Use    Smoking status: Former Smoker    Smokeless tobacco: Never Used   Vaping Use    Vaping Use: Never used   Substance Use Topics    Alcohol use: Not Currently     Comment: occasional    Drug use: Not Currently     Types: Marijuana (Weed)     Comment: in the past- marijuana Many years ago       ALLERGIES    Allergies   Allergen Reactions    Amoxicillin Diarrhea       MEDICATIONS    Current Outpatient Medications on File Prior to Encounter   Medication Sig Dispense Refill    ARIPiprazole (ABILIFY) 5 MG tablet Take 7.5 mg by mouth daily      PARoxetine (PAXIL) 40 MG tablet Take 40 mg by mouth daily      gemfibrozil (LOPID) 600 MG tablet Take 1 tablet by mouth 2 times daily 60 tablet 2    traZODone (DESYREL) 50 MG tablet nightly PRN      methylphenidate (RITALIN) 20 MG tablet Take one and one-half a day       No current facility-administered medications on file prior to encounter.        PROBLEM LIST    Patient Active Problem List   Diagnosis    Chronic low back pain    Bipolar 2 disorder ([facility] [address]    Anxiety    ADHD    Depression    Postoperative complication of skin involving drainage from surgical wound    Hematoma of hip, right, initial encounter    WD-Nonhealing surgical wound    WD-Complicated open wound of right hip       REVIEW OF SYSTEMS    Constitutional: negative for anorexia, chills, fatigue, fevers, malaise, sweats and weight loss  Respiratory: negative for pneumonia, shortness of breath, sputum, stridor and wheezing  Cardiovascular: negative for near-syncope, orthopnea, palpitations, paroxysmal nocturnal dyspnea, syncope and tachypnea  Integument/breast: positive for skin lesion(s)      Objective: There were no vitals taken for this visit. PHYSICAL EXAM  General Appearance: alert and oriented to person, place and time, well-developed and well-nourished, in no acute distress  Pulmonary/Chest: clear to auscultation bilaterally- no wheezes, rales or rhonchi, normal air movement, no respiratory distress  Cardiovascular: normal rate, normal S1 and S2, no gallops, intact distal pulses and no carotid bruits  Dermatologic exam: Visual inspection of the periwound reveals the skin to be normal in turgor and texture and dry  Wound exam: see wound description below in procedure note      Assessment:       Laura Cooper  appears to have a non-healing wound of the right hip. The etiology of the wound is felt to be non-healing surgical and traumatic. There are multiple complicating factors including none. A comprehensive wound management program would be helpful to heal this wound. Assessments completed include fall risk and nutritional, functional,and psychological status. At this time appropriate care would include: periodic debridement and wound care as below. Problem List Items Addressed This Visit        Other    WD-Nonhealing surgical wound    WD-Complicated open wound of right hip          Negative Pressure Wound Therapy Leg Anterior;Right;Upper (Active)   Wound Type Surgical 05/12/22 0913   Unit Type kci 05/12/22 0913   Dressing Type Black Foam;White Foam 05/12/22 0913   Number of pieces used 4 05/09/22 2241   Number of pieces removed 2 05/11/22 1018   Cycle Continuous 05/12/22 0913   Target Pressure (mmHg) 125 05/12/22 0913   Canister changed?  No 05/12/22 0913   Dressing Status Clean, dry & intact 05/12/22 0913 Dressing Changed Changed/New 05/11/22 1018   Drainage Amount Moderate 05/11/22 1018   Drainage Description Serosanguinous 05/12/22 0913   Dressing Change Due 05/13/22 05/11/22 1018   Wound Assessment Pink 05/12/22 0913   Odor None 05/11/22 1018   Number of days: 9       Wound 05/11/22 Thigh Anterior;Right (Active)   Wound Image   05/11/22 1018   Wound Etiology Non-Healing Surgical 05/11/22 1018   Dressing Status Clean;Dry; Intact 05/12/22 0913   Wound Cleansed Cleansed with saline 05/11/22 1018   Dressing/Treatment Negative pressure wound therapy 05/12/22 0913   Wound Length (cm) 11.5 cm 05/11/22 1018   Wound Width (cm) 6 cm 05/11/22 1018   Wound Depth (cm) 3 cm 05/11/22 1018   Wound Surface Area (cm^2) 69 cm^2 05/11/22 1018   Wound Volume (cm^3) 207 cm^3 05/11/22 1018   Distance Tunneling (cm) 0 cm 05/11/22 1018   Tunneling Position ___ O'Clock 0 05/11/22 1018   Undermining Starts ___ O'Clock 12 05/11/22 1018   Undermining Ends___ O'Clock 12 05/11/22 1018   Undermining Maxium Distance (cm) 5 05/11/22 1018   Wound Assessment Pink/red;Subcutaneous 05/11/22 1018   Drainage Amount Moderate 05/11/22 1018   Drainage Description Serosanguinous 05/11/22 1018   Odor None 05/11/22 1018   Fatoumata-wound Assessment Intact 05/11/22 1018   Margins Defined edges 05/11/22 1018   Wound Thickness Description not for Pressure Injury Full thickness 05/11/22 1018   Number of days: 7       Wound 05/18/22 Wound #1 Right hip (Active)   Wound Image   05/18/22 1454   Wound Cleansed Wound cleanser; Soap and water 05/18/22 1454   Wound Length (cm) 11 cm 05/18/22 1454   Wound Width (cm) 5.2 cm 05/18/22 1454   Wound Depth (cm) 2 cm 05/18/22 1454   Wound Surface Area (cm^2) 57.2 cm^2 05/18/22 1454   Wound Volume (cm^3) 114.4 cm^3 05/18/22 1454   Distance Tunneling (cm) 0 cm 05/18/22 1454   Tunneling Position ___ O'Clock 0 05/18/22 1454   Undermining Starts ___ O'Clock 1200 05/18/22 1454   Undermining Ends___ O'Clock 1200 05/18/22 1454   Undermining Maxium Distance (cm) 1.0 05/18/22 1454   Wound Assessment Granulation tissue;Pink/red;Slough 05/18/22 1454   Drainage Amount Moderate 05/18/22 1454   Drainage Description Serosanguinous 05/18/22 1454   Odor None 05/18/22 1454   Fatoumata-wound Assessment Fragile; Intact; Warm 05/18/22 1454   Margins Defined edges 05/18/22 1454   Wound Thickness Description not for Pressure Injury Full thickness 05/18/22 1454   Number of days: 0       We will add wound orders for home health. They will come out today to change     We will continue to prescribe patient opioid pain medication at this time. Patient understands all side effects and contraindications of opioids. No indication of abuse or seeking behavior. OARRS report checked at this time. We will continue to monitor. Plan:     Discharge instructions:    Discharge Instructions       PHYSICIAN ORDERS AND DISCHARGE INSTRUCTIONS    NOTE: Upon discharge from the [address], you will receive a patient experience survey. We would be grateful if you would take the time to fill this survey out. Wound care order history:        Vascular studies:   Date    Imaging:   Date    Cultures:   Date    Grafts:  Date    Antibiotics:               Earlier Wound care treatments:     Primary care physician:     Continuing wound care orders and information:              Residence:                Continue home health care with:     Wound Medications:    Wound cleansing:      Do not scrub or use excessive force. Wash hands with soap and water before and after dressing changes. Prior to applying a clean dressing, cleanse wound with normal saline,          wound cleanser, or mild soap and water. Ask the physician or nurse before getting the wound(s) wet in a shower   Daily Wound management:    Keep weight off wounds and reposition every 2 hours. Avoid standing for long periods of time. Apply wraps/stockings in AM and remove at bedtime.     If swelling is present, elevate legs to the level of the heart or above for 30                              minutes 4-5 times a day and/or when sitting. When taking antibiotics take entire prescription as ordered by physician    do not stop taking until medicine is all gone. Orders for this week:     FAX TO [facility]    Right Lateral thigh-   TODAY IN CLINIC APPLY STIMULEN TO WOUND BED AND PACK WITH KERLEX AND COVER WITH SORBREX AND HYPERFIX TAPE. [facility] TO REAPPLY ASAP        WOUND VAC THERAPY:   APPLY stimulen to wound bed. DUODERM TO PERIWOUND FOR PROTECTION. APPLY BLACK FOAM TO WOUND BED AND UNDERMINING  . SECURE VAC. DRESSING WITH DRAPE. SET WOUND VAC  CONTINUOUS SUCTION. CANISTER CHANGE WITH EACH DRESSING CHANGE OR ACCORDING TO VOLUME OF DRAINAGE. WOUND VAC DRESSING TO BE CHANGED MON, WED, FRI           RX:  Consult:  Central [phone]    Follow up with ELDA Chávez CNP  In 1 weeks in the wound care center  Call [phone] for any questions or concerns.   Date__________   Time____________      Signature____________________________________________________        Treatment Note Wound 22 Wound #1 Right hip-Dressing/Treatment:  (stimulen kerlix sorbex hyperfix tape)    Written Patient Dismissal Instructions Given            Electronically signed by LISBET Lucero CNP on 2022 at 4:13 PM

## 2022-05-22 ENCOUNTER — HOSPITAL ENCOUNTER (EMERGENCY)
Age: 37
Discharge: HOME | End: 2022-05-22
Attending: STUDENT IN AN ORGANIZED HEALTH CARE EDUCATION/TRAINING PROGRAM
Payer: MEDICARE

## 2022-05-22 ENCOUNTER — APPOINTMENT (OUTPATIENT)
Dept: CT IMAGING | Age: 37
End: 2022-05-22
Payer: MEDICARE

## 2022-05-22 ENCOUNTER — APPOINTMENT (OUTPATIENT)
Dept: ULTRASOUND IMAGING | Age: 37
End: 2022-05-22
Payer: MEDICARE

## 2022-05-22 VITALS
SYSTOLIC BLOOD PRESSURE: 102 MMHG | DIASTOLIC BLOOD PRESSURE: 83 MMHG | TEMPERATURE: 98.7 F | OXYGEN SATURATION: 95 % | HEART RATE: 88 BPM | BODY MASS INDEX: 25.67 KG/M2 | RESPIRATION RATE: 16 BRPM | HEIGHT: 74 IN | WEIGHT: 200 LBS

## 2022-05-22 DIAGNOSIS — M79.604 ACUTE PAIN OF RIGHT LOWER EXTREMITY: Primary | ICD-10-CM

## 2022-05-22 LAB
ALBUMIN SERPL-MCNC: 3.8 GM/DL (ref 3.4–5)
ALP BLD-CCNC: 96 IU/L (ref 40–129)
ALT SERPL-CCNC: 43 U/L (ref 10–40)
ANION GAP SERPL CALCULATED.3IONS-SCNC: 13 MMOL/L (ref 4–16)
AST SERPL-CCNC: 31 IU/L (ref 15–37)
BACTERIA: NEGATIVE /HPF
BASOPHILS ABSOLUTE: 0.1 K/CU MM
BASOPHILS RELATIVE PERCENT: 0.7 % (ref 0–1)
BILIRUB SERPL-MCNC: 0.3 MG/DL (ref 0–1)
BILIRUBIN URINE: NEGATIVE MG/DL
BLOOD, URINE: NEGATIVE
BUN BLDV-MCNC: 6 MG/DL (ref 6–23)
CALCIUM SERPL-MCNC: 8.9 MG/DL (ref 8.3–10.6)
CHLORIDE BLD-SCNC: 104 MMOL/L (ref 99–110)
CLARITY: CLEAR
CO2: 20 MMOL/L (ref 21–32)
COLOR: YELLOW
CREAT SERPL-MCNC: 0.8 MG/DL (ref 0.9–1.3)
DIFFERENTIAL TYPE: ABNORMAL
EOSINOPHILS ABSOLUTE: 0.2 K/CU MM
EOSINOPHILS RELATIVE PERCENT: 2 % (ref 0–3)
GFR AFRICAN AMERICAN: >60 ML/MIN/1.73M2
GFR NON-AFRICAN AMERICAN: >60 ML/MIN/1.73M2
GLUCOSE BLD-MCNC: 97 MG/DL (ref 70–99)
GLUCOSE, URINE: NEGATIVE MG/DL
HCT VFR BLD CALC: 46.8 % (ref 42–52)
HEMOGLOBIN: 16.2 GM/DL (ref 13.5–18)
IMMATURE NEUTROPHIL %: 0.2 % (ref 0–0.43)
KETONES, URINE: NEGATIVE MG/DL
LEUKOCYTE ESTERASE, URINE: NEGATIVE
LYMPHOCYTES ABSOLUTE: 2.5 K/CU MM
LYMPHOCYTES RELATIVE PERCENT: 29.1 % (ref 24–44)
MAGNESIUM: 2.4 MG/DL (ref 1.8–2.4)
MCH RBC QN AUTO: 31.3 PG (ref 27–31)
MCHC RBC AUTO-ENTMCNC: 34.6 % (ref 32–36)
MCV RBC AUTO: 90.3 FL (ref 78–100)
MONOCYTES ABSOLUTE: 0.7 K/CU MM
MONOCYTES RELATIVE PERCENT: 8.3 % (ref 0–4)
MUCUS: ABNORMAL HPF
NITRITE URINE, QUANTITATIVE: NEGATIVE
NUCLEATED RBC %: 0 %
PDW BLD-RTO: 11.6 % (ref 11.7–14.9)
PH, URINE: 6.5 (ref 5–8)
PLATELET # BLD: 527 K/CU MM (ref 140–440)
PMV BLD AUTO: 9.9 FL (ref 7.5–11.1)
POTASSIUM SERPL-SCNC: 3.7 MMOL/L (ref 3.5–5.1)
PROTEIN UA: NEGATIVE MG/DL
RBC # BLD: 5.18 M/CU MM (ref 4.6–6.2)
RBC URINE: ABNORMAL /HPF (ref 0–3)
SEGMENTED NEUTROPHILS ABSOLUTE COUNT: 5.2 K/CU MM
SEGMENTED NEUTROPHILS RELATIVE PERCENT: 59.7 % (ref 36–66)
SODIUM BLD-SCNC: 137 MMOL/L (ref 135–145)
SPECIFIC GRAVITY UA: 1.01 (ref 1–1.03)
SQUAMOUS EPITHELIAL: <1 /HPF
TOTAL IMMATURE NEUTOROPHIL: 0.02 K/CU MM
TOTAL NUCLEATED RBC: 0 K/CU MM
TOTAL PROTEIN: 6.8 GM/DL (ref 6.4–8.2)
UROBILINOGEN, URINE: NORMAL MG/DL (ref 0.2–1)
WBC # BLD: 8.7 K/CU MM (ref 4–10.5)
WBC UA: <1 /HPF (ref 0–2)

## 2022-05-22 PROCEDURE — 6360000002 HC RX W HCPCS: Performed by: STUDENT IN AN ORGANIZED HEALTH CARE EDUCATION/TRAINING PROGRAM

## 2022-05-22 PROCEDURE — 2580000003 HC RX 258: Performed by: STUDENT IN AN ORGANIZED HEALTH CARE EDUCATION/TRAINING PROGRAM

## 2022-05-22 PROCEDURE — 99285 EMERGENCY DEPT VISIT HI MDM: CPT

## 2022-05-22 PROCEDURE — 80053 COMPREHEN METABOLIC PANEL: CPT

## 2022-05-22 PROCEDURE — 93005 ELECTROCARDIOGRAM TRACING: CPT | Performed by: STUDENT IN AN ORGANIZED HEALTH CARE EDUCATION/TRAINING PROGRAM

## 2022-05-22 PROCEDURE — 83735 ASSAY OF MAGNESIUM: CPT

## 2022-05-22 PROCEDURE — 96374 THER/PROPH/DIAG INJ IV PUSH: CPT

## 2022-05-22 PROCEDURE — 72131 CT LUMBAR SPINE W/O DYE: CPT

## 2022-05-22 PROCEDURE — 73701 CT LOWER EXTREMITY W/DYE: CPT

## 2022-05-22 PROCEDURE — 6370000000 HC RX 637 (ALT 250 FOR IP): Performed by: STUDENT IN AN ORGANIZED HEALTH CARE EDUCATION/TRAINING PROGRAM

## 2022-05-22 PROCEDURE — 81001 URINALYSIS AUTO W/SCOPE: CPT

## 2022-05-22 PROCEDURE — 85025 COMPLETE CBC W/AUTO DIFF WBC: CPT

## 2022-05-22 PROCEDURE — 93971 EXTREMITY STUDY: CPT

## 2022-05-22 PROCEDURE — 96375 TX/PRO/DX INJ NEW DRUG ADDON: CPT

## 2022-05-22 PROCEDURE — 6360000004 HC RX CONTRAST MEDICATION: Performed by: STUDENT IN AN ORGANIZED HEALTH CARE EDUCATION/TRAINING PROGRAM

## 2022-05-22 RX ORDER — ONDANSETRON 2 MG/ML
4 INJECTION INTRAMUSCULAR; INTRAVENOUS EVERY 6 HOURS PRN
Status: DISCONTINUED | OUTPATIENT
Start: 2022-05-22 | End: 2022-05-23 | Stop reason: HOSPADM

## 2022-05-22 RX ORDER — SULFAMETHOXAZOLE AND TRIMETHOPRIM 800; 160 MG/1; MG/1
1 TABLET ORAL ONCE
Status: COMPLETED | OUTPATIENT
Start: 2022-05-22 | End: 2022-05-22

## 2022-05-22 RX ORDER — CEPHALEXIN 250 MG/1
500 CAPSULE ORAL ONCE
Status: COMPLETED | OUTPATIENT
Start: 2022-05-22 | End: 2022-05-22

## 2022-05-22 RX ORDER — MORPHINE SULFATE 4 MG/ML
4 INJECTION, SOLUTION INTRAMUSCULAR; INTRAVENOUS ONCE
Status: COMPLETED | OUTPATIENT
Start: 2022-05-22 | End: 2022-05-22

## 2022-05-22 RX ORDER — OXYCODONE HYDROCHLORIDE AND ACETAMINOPHEN 5; 325 MG/1; MG/1
1 TABLET ORAL ONCE
Status: COMPLETED | OUTPATIENT
Start: 2022-05-22 | End: 2022-05-22

## 2022-05-22 RX ORDER — SULFAMETHOXAZOLE AND TRIMETHOPRIM 800; 160 MG/1; MG/1
1 TABLET ORAL 2 TIMES DAILY
Qty: 20 TABLET | Refills: 0 | Status: SHIPPED | OUTPATIENT
Start: 2022-05-22 | End: 2022-06-01

## 2022-05-22 RX ORDER — 0.9 % SODIUM CHLORIDE 0.9 %
1000 INTRAVENOUS SOLUTION INTRAVENOUS ONCE
Status: COMPLETED | OUTPATIENT
Start: 2022-05-22 | End: 2022-05-22

## 2022-05-22 RX ORDER — HYDROCODONE BITARTRATE AND ACETAMINOPHEN 5; 325 MG/1; MG/1
1 TABLET ORAL EVERY 6 HOURS PRN
Qty: 6 TABLET | Refills: 0 | Status: SHIPPED | OUTPATIENT
Start: 2022-05-22 | End: 2022-05-25

## 2022-05-22 RX ORDER — CEPHALEXIN 500 MG/1
500 CAPSULE ORAL 4 TIMES DAILY
Qty: 40 CAPSULE | Refills: 0 | Status: SHIPPED | OUTPATIENT
Start: 2022-05-22 | End: 2022-06-01

## 2022-05-22 RX ADMIN — IOPAMIDOL 75 ML: 755 INJECTION, SOLUTION INTRAVENOUS at 19:49

## 2022-05-22 RX ADMIN — OXYCODONE HYDROCHLORIDE AND ACETAMINOPHEN 1 TABLET: 5; 325 TABLET ORAL at 21:30

## 2022-05-22 RX ADMIN — CEPHALEXIN 500 MG: 250 CAPSULE ORAL at 22:29

## 2022-05-22 RX ADMIN — MORPHINE SULFATE 4 MG: 4 INJECTION, SOLUTION INTRAMUSCULAR; INTRAVENOUS at 18:16

## 2022-05-22 RX ADMIN — SULFAMETHOXAZOLE AND TRIMETHOPRIM 1 TABLET: 800; 160 TABLET ORAL at 22:30

## 2022-05-22 RX ADMIN — SODIUM CHLORIDE 1000 ML: 9 INJECTION, SOLUTION INTRAVENOUS at 18:20

## 2022-05-22 RX ADMIN — ONDANSETRON 4 MG: 2 INJECTION INTRAMUSCULAR; INTRAVENOUS at 18:15

## 2022-05-22 ASSESSMENT — PAIN DESCRIPTION - ORIENTATION: ORIENTATION: RIGHT

## 2022-05-22 ASSESSMENT — PAIN DESCRIPTION - LOCATION: LOCATION: LEG

## 2022-05-22 ASSESSMENT — PAIN DESCRIPTION - DESCRIPTORS: DESCRIPTORS: SHARP

## 2022-05-22 ASSESSMENT — PAIN SCALES - GENERAL: PAINLEVEL_OUTOF10: 8

## 2022-05-22 ASSESSMENT — PAIN DESCRIPTION - PAIN TYPE: TYPE: ACUTE PAIN

## 2022-05-22 ASSESSMENT — PAIN - FUNCTIONAL ASSESSMENT: PAIN_FUNCTIONAL_ASSESSMENT: 0-10

## 2022-05-22 NOTE — ED PROVIDER NOTES
Emergency Department Encounter    Patient: Seble Navarro  MRN: [id number]  : 1985  Date of Evaluation: 2022  ED Provider:  Lamonte Goldberg MD    Triage Chief Complaint:   Other (numbness in the right leg where his wound vac is, patient post op a week ago with wound vac placement, called dr was advised to come to ED, redness around the wound vac, severe pain)    [place]:  Seble Navarro is a 39 y.o. male with history seen below with recent excision large pseudocyst with soft tissue masses secondary to history of prior trauma. He recently underwent removal of soft tissue masses drain was placed by postop drain clogged and patient developed postoperative hematoma. Patient on 2022 for washout of hematoma with wound VAC placement by Dr. Loraine Lentz. Patient states for the past 2 days he has had increasing pain on the medial aspect on the anterior aspect of his thigh that has been constant, moderate, stabbing, worse with palpation without relieving factors. States he also is having paresthesias below the wound VAC. States he does have a history of neuropathy but this is worse than his baseline. States he did have a temperature of 101 yesterday but no fever today. Denies overlying erythema, fluctuance or purulence. States the wound VAC is draining well without changes in consistency of fluid from the wound VAC. States he has chronic back pain denies any significantly increased back pain from baseline. Denies ever erythema. Drug use other than marijuana. Denies alcohol use other than occasional social drinking. Denies bowel or bladder changes. Denies any weakness in his lower extremities. Denies headache, blurred vision, focal neurodeficits other than what is described above. Denies C or T-spine pain. Denies chest pain or shortness of breath, cough or sputum production. Denies dysuria, increased frequency, urgency, hematuria.   Denies nausea vomiting, diarrhea constipation, blood or melena stools.     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     L5, S1    Depression     Hyperlipidemia     Neuropathy 2020    Seizures ([facility] [address]      Past Surgical History:   Procedure Laterality Date    EYE SURGERY      for amblyopia    LEG SURGERY Right 5/9/2022    RIGHT THIGH DEBRIDEMENT INCISION AND DRAINAGE AND WASHOUT AND WOUND VAC PLACEMENT performed by Arsenio Jolley DO at [address] Right 5/5/2022    RIGHT LATERAL THIGH LESION BIOPSY EXCISION performed by Arsenio Jolley DO at [address] TYMPANOSTOMY TUBE PLACEMENT       Family History   Problem Relation Age of Onset    Ovarian Cancer Mother     Bipolar Disorder Mother     Lung Cancer Father     Other Brother         cerebral palsy    Diabetes Maternal Grandmother     Diabetes Paternal Grandmother      Social History     Socioeconomic History    Marital status: Legally      Spouse name: Not on file    Number of children: 3    Years of education: Not on file    Highest education level: Not on file   Occupational History    Occupation: Landscaping   Tobacco Use    Smoking status: Former Smoker    Smokeless tobacco: Never Used   Vaping Use    Vaping Use: Never used   Substance and Sexual Activity    Alcohol use: Not Currently     Comment: occasional    Drug use: Not Currently     Types: Marijuana (Weed)     Comment: in the past- marijuana Many years ago    Sexual activity: Yes     Partners: Female   Other Topics Concern    Not on file   Social History Narrative    Not on file     Social Determinants of Health     Financial Resource Strain: Low Risk     Difficulty of Paying Living Expenses: Not hard at all   Food Insecurity: No Food Insecurity    Worried About Running Out of Food in the Last Year: Never true    Ran Out of Food in the Last Year: Never true   Transportation Needs:     Lack of Transportation (Medical): Not on file    Lack of Transportation (Non-Medical): Not on file   Physical Activity:     Days of Exercise per Week: Not on file    Minutes of Exercise per Session: Not on file   Stress:     Feeling of Stress : Not on file   Social Connections:     Frequency of Communication with Friends and Family: Not on file    Frequency of Social Gatherings with Friends and Family: Not on file    Attends Bahai Services: Not on file    Active Member of [address] [place] [address] [employer] or Organizations: Not on file    Attends Club or Organization Meetings: Not on file    Marital Status: Not on file   Intimate Partner Violence:     Fear of Current or Ex-Partner: Not on file    Emotionally Abused: Not on file    Physically Abused: Not on file    Sexually Abused: Not on file   Housing Stability:     Unable to Pay for Housing in the Last Year: Not on file    Number of [place] in the Last Year: Not on file    Unstable Housing in the Last Year: Not on file     Current Facility-Administered Medications   Medication Dose Route Frequency Provider Last Rate Last Admin    morphine sulfate (PF) injection 4 mg  4 mg IntraVENous Once Shayla Mukherjee MD        [facility]) injection 4 mg  4 mg IntraVENous Q6H PRN Shayla Mukherjee MD         Current Outpatient Medications   Medication Sig Dispense Refill    HYDROcodone-acetaminophen (NORCO) 5-325 MG per tablet Take 1 tablet by mouth every 12 hours as needed for Pain for up to 7 days. Intended supply: 3 days.  Take lowest dose possible to manage pain 14 tablet 0    ARIPiprazole (ABILIFY) 5 MG tablet Take 7.5 mg by mouth daily      PARoxetine (PAXIL) 40 MG tablet Take 40 mg by mouth daily      gemfibrozil (LOPID) 600 MG tablet Take 1 tablet by mouth 2 times daily 60 tablet 2    traZODone (DESYREL) 50 MG tablet nightly PRN      methylphenidate (RITALIN) 20 MG tablet Take one and one-half a day       Allergies   Allergen Reactions    Amoxicillin Diarrhea       Nursing Notes Reviewed    Physical Exam:  Triage VS:    ED Triage Vitals [05/22/22 6598]   Enc Vitals Group      /83      Pulse 88      Resp 16      Temp 98.7 °F (37.1 °C)      Temp Source Oral      SpO2 95 %      Weight 200 lb (90.7 kg)      Height 6' 2\" (1.88 m)      Head Circumference       Peak Flow       Pain Score       Pain Loc       Pain Edu? Excl. in [address]? My pulse ox interpretation is - normal    General appearance:  No acute distress. Skin:  Warm. Dry. Eye:  Extraocular movements intact. Ears, nose, mouth and throat:  Oral mucosa moist   Neck:  Trachea midline. Extremity: Wound VAC in place over the lateral aspect of the right thigh, no surrounding erythema patient does have tenderness palpation over the anterior aspect of the right thigh, patient has 2+ distal pulses, normal sensation to light touch, normal range of motion of the hip, knee, ankle, 5 out of 5 motor strength lower extremities bilaterally, patient does describe some mildly decreased sensation on the right no swelling. Normal ROM     Heart:  Regular rate and rhythm, normal S1 & S2, no extra heart sounds. Perfusion:  intact  Respiratory:  Lungs clear to auscultation bilaterally. Respirations nonlabored. Abdominal:  Normal bowel sounds. Soft. Nontender. Non distended. Back:  No CVA tenderness to palpation mild discomfort along the right paraspinous region which patient states is chronic for him, 5 out of 5 motor strength in bilateral lower extremities, no saddle anesthesia, no bowel or bladder changes     Neurological:  Alert and oriented times 3. No focal neuro deficits.            Psychiatric:  Appropriate    I have reviewed and interpreted all of the currently available lab results from this visit (if applicable):  Results for orders placed or performed during the hospital encounter of 05/22/22   CBC with Auto Differential   Result Value Ref Range    WBC 8.7 4.0 - 10.5 K/CU MM    RBC 5.18 4.6 - 6.2 M/CU MM    Hemoglobin 16.2 13.5 - 18.0 GM/DL    Hematocrit 46.8 42 - 52 %    MCV 90.3 78 - 100 FL    MCH 31.3 (H) 27 - 31 PG    MCHC 34.6 32.0 - 36.0 %    RDW 11.6 (L) 11.7 - 14.9 %    Platelets 228 (H) 879 - 440 K/CU MM    MPV 9.9 7.5 - 11.1 FL    Differential Type AUTOMATED DIFFERENTIAL     Segs Relative 59.7 36 - 66 %    Lymphocytes % 29.1 24 - 44 %    Monocytes % 8.3 (H) 0 - 4 %    Eosinophils % 2.0 0 - 3 %    Basophils % 0.7 0 - 1 %    Segs Absolute 5.2 K/CU MM    Lymphocytes Absolute 2.5 K/CU MM    Monocytes Absolute 0.7 K/CU MM    Eosinophils Absolute 0.2 K/CU MM    Basophils Absolute 0.1 K/CU MM    Nucleated RBC % 0.0 %    Total Nucleated RBC 0.0 K/CU MM    Total Immature Neutrophil 0.02 K/CU MM    Immature Neutrophil % 0.2 0 - 0.43 %      Radiographs (if obtained):  Radiologist's Report Reviewed:  No results found. EKG (if obtained): (All EKG's are interpreted by myself in the absence of a cardiologist)  Normal sinus rhythm, ventricular rate 90, GA interval 138, QRS duration 88, QTc 459, mildly wandering baseline does limit evaluation but no significant ST elevation or depression, overall reassuring EKG    MDM:    79-year-old male with history seen above presenting with pain surrounding wound VAC as well as paresthesias distally. Patient be seen above. Vitals on presentation are reassuring and patient afebrile satting on room air. On physical exam patient does have some mildly decreased sensation on the right lower extremity distal from the wound VAC. Patient has 2+ distal pulses, less than 2-second cap refill, normal range of motion, no sensory deficit erythema, fluctuance or purulence with no signs of septic joint normal range of motion.   Patient has no tenderness palpation the abdomen, no flank pain, no CVA tenderness. Patient has chronic back pain which is not significantly changed from baseline. denies any bowel or bladder changes and feels that the paresthesias are more related to wound VAC. CBC is reassuring without leukocytosis. Overall CBC, CMP, magnesium is reassuring. UA not consistent with infection. Patient denies any respiratory symptoms. CT L-spine was obtained and shows degenerative changes but no acute changes. T of the right thigh reveals deep postoperative soft tissue defect over the anterior lateral aspect of the mid to proximal right thigh without complication identified. There is no significant residual fluid collection with mild adjacent subcutaneous fat stranding and skin thickening likely reactive although cellulitis is not excluded with no soft tissue gas. No osseous abnormality. With increased pain and fever yesterday in discussion with Dr. Elmo Gomez of surgery we will treat with antibiotics outpatient with close follow-up with wound care and general surgery. Dr. Elmo Gomez does not believe patient requires inpatient admission and overall patient is very well-appearing. States she did suggest that wound VAC be taken off his concern that nerve irritation may be causing distal paresthesias. Wound VAC was removed in the ED. Per Dr. Elmo Gomez placed on wet-to-dry dressings with follow-up with wound care closely. I did discuss strict return precautions with patient as well as close follow-up. Patient agreeable to plan. Patient discharged home    Clinical Impression:  1. Acute pain of right lower extremity          Comment: Please note this report has been produced using speech recognition software and may contain errors related to that system including errors in grammar, punctuation, and spelling, as well as words and phrases that may be inappropriate. Efforts were made to edit the dictations.        Richard Singer MD  05/23/22 0023       Richard Singer MD  05/23/22 8373

## 2022-05-22 NOTE — ACP (ADVANCE CARE PLANNING)
Patient does not have any ACP documents/Medical Power of . LSW notes hospital will follow [place]'s Next of Kin hierarchy in the following descending order for priority:    Guardian  Spouse  [de-identified] of adult Children  Parents  [de-identified] of adult Siblings  Nearest Relative not described above    Per [place]'s Next of Kin hierarchy:  Patients' Brother/Sister will be Primary Healthcare Decision Maker.

## 2022-05-23 LAB
EKG ATRIAL RATE: 90 BPM
EKG DIAGNOSIS: NORMAL
EKG P AXIS: 60 DEGREES
EKG P-R INTERVAL: 138 MS
EKG Q-T INTERVAL: 376 MS
EKG QRS DURATION: 88 MS
EKG QTC CALCULATION (BAZETT): 459 MS
EKG R AXIS: 50 DEGREES
EKG T AXIS: 37 DEGREES
EKG VENTRICULAR RATE: 90 BPM

## 2022-05-23 PROCEDURE — 93010 ELECTROCARDIOGRAM REPORT: CPT | Performed by: INTERNAL MEDICINE

## 2022-05-23 NOTE — CARE COORDINATION
CM received consult from primary nurse for patient in room #33 to assist with discharge transportation. CM met with patient to determine need for transportation. Patient states he was unable to secure transportation home with family or friends. CM placed telephone call to [employer]. ETA 20-25 minutes. CM verified patient address. Patient to be transported to [address]

## 2022-05-24 ENCOUNTER — TELEPHONE (OUTPATIENT)
Dept: WOUND CARE | Age: 37
End: 2022-05-24

## 2022-05-24 NOTE — TELEPHONE ENCOUNTER
Spoke with client's home care nurse Piper Rangel from [facility] that informed the [facility] that client had gone to the ER due to an allergic reaction to the vac drape. Client is now refusing to use the vac. Home care nurse had requested to do wet to dry dressings daily until client could return to the wound center tomorrow on Wednesday afternoon. SN had suggested getting [employer] silicone adhesive, otherwise will reevaluate and send new orders after appt tomorrow.     Electronically signed by Jeremy Centeno RN on 5/24/2022 at 4:10 PM

## 2022-05-25 ENCOUNTER — HOSPITAL ENCOUNTER (OUTPATIENT)
Dept: WOUND CARE | Age: 37
Discharge: HOME | End: 2022-05-25
Payer: MEDICARE

## 2022-05-25 VITALS
TEMPERATURE: 98 F | DIASTOLIC BLOOD PRESSURE: 68 MMHG | SYSTOLIC BLOOD PRESSURE: 133 MMHG | RESPIRATION RATE: 16 BRPM | HEART RATE: 77 BPM

## 2022-05-25 DIAGNOSIS — S71.001A COMPLICATED OPEN WOUND OF RIGHT HIP, INITIAL ENCOUNTER: ICD-10-CM

## 2022-05-25 DIAGNOSIS — T81.89XA NON-HEALING SURGICAL WOUND, INITIAL ENCOUNTER: Primary | ICD-10-CM

## 2022-05-25 PROCEDURE — 11045 DBRDMT SUBQ TISS EACH ADDL: CPT | Performed by: NURSE PRACTITIONER

## 2022-05-25 PROCEDURE — 11045 DBRDMT SUBQ TISS EACH ADDL: CPT

## 2022-05-25 PROCEDURE — 11042 DBRDMT SUBQ TIS 1ST 20SQCM/<: CPT

## 2022-05-25 PROCEDURE — 11042 DBRDMT SUBQ TIS 1ST 20SQCM/<: CPT | Performed by: NURSE PRACTITIONER

## 2022-05-25 RX ORDER — BETAMETHASONE DIPROPIONATE 0.05 %
OINTMENT (GRAM) TOPICAL ONCE
Status: CANCELLED | OUTPATIENT
Start: 2022-05-25 | End: 2022-05-25

## 2022-05-25 RX ORDER — BACITRACIN, NEOMYCIN, POLYMYXIN B 400; 3.5; 5 [USP'U]/G; MG/G; [USP'U]/G
OINTMENT TOPICAL ONCE
Status: CANCELLED | OUTPATIENT
Start: 2022-05-25 | End: 2022-05-25

## 2022-05-25 RX ORDER — LIDOCAINE HYDROCHLORIDE 20 MG/ML
JELLY TOPICAL ONCE
Status: CANCELLED | OUTPATIENT
Start: 2022-05-25 | End: 2022-05-25

## 2022-05-25 RX ORDER — LIDOCAINE 40 MG/G
CREAM TOPICAL ONCE
Status: CANCELLED | OUTPATIENT
Start: 2022-05-25 | End: 2022-05-25

## 2022-05-25 RX ORDER — BACITRACIN ZINC AND POLYMYXIN B SULFATE 500; 1000 [USP'U]/G; [USP'U]/G
OINTMENT TOPICAL ONCE
Status: CANCELLED | OUTPATIENT
Start: 2022-05-25 | End: 2022-05-25

## 2022-05-25 RX ORDER — LIDOCAINE HYDROCHLORIDE 40 MG/ML
SOLUTION TOPICAL ONCE
Status: CANCELLED | OUTPATIENT
Start: 2022-05-25 | End: 2022-05-25

## 2022-05-25 RX ORDER — LIDOCAINE 50 MG/G
OINTMENT TOPICAL ONCE
Status: CANCELLED | OUTPATIENT
Start: 2022-05-25 | End: 2022-05-25

## 2022-05-25 RX ORDER — GENTAMICIN SULFATE 1 MG/G
OINTMENT TOPICAL ONCE
Status: CANCELLED | OUTPATIENT
Start: 2022-05-25 | End: 2022-05-25

## 2022-05-25 RX ORDER — GINSENG 100 MG
CAPSULE ORAL ONCE
Status: CANCELLED | OUTPATIENT
Start: 2022-05-25 | End: 2022-05-25

## 2022-05-25 RX ORDER — CLOBETASOL PROPIONATE 0.5 MG/G
OINTMENT TOPICAL ONCE
Status: CANCELLED | OUTPATIENT
Start: 2022-05-25 | End: 2022-05-25

## 2022-05-25 NOTE — PROGRESS NOTES
Wound Care Center Progress Note With Procedure    Lizzy Castro  AGE: 39 y.o. GENDER: male  : 1985  EPISODE DATE:  2022     Subjective:     Chief Complaint   Patient presents with    Wound Check     rt hip         HISTORY of PRESENT ILLNESS        Lizzy Castro is a 39 y.o. male who presents to the [address] [place] [address] for a visit for evaluation and treatment of Chronic non-healing surgical and traumatic  ulcer(s) of  Right upper lateral leg / right hip. The condition is of moderate severity. The ulcer has been present for 2 weeks, but this issue started last July when he was bitten by a dog. The underlying cause is thought to be trauma and nonhealing surgical.  The patients care to date has included JEFFERSON drain, which became infected. The patient has significant underlying medical conditions as below.      Patient had to go to the ED with a reaction to the NPT device drape.   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 [address]        PAST SURGICAL HISTORY    Past Surgical History:   Procedure Laterality Date    EYE SURGERY      for amblyopia    LEG SURGERY Right 2022    RIGHT THIGH DEBRIDEMENT INCISION AND DRAINAGE AND WASHOUT AND WOUND VAC PLACEMENT performed by Sharyle Genre, DO at [facility] Right 2022    RIGHT LATERAL THIGH LESION BIOPSY EXCISION performed by Sharyle Genre, DO at [place] [address] TONSILLECTOMY AND ADENOIDECTOMY      TYMPANOSTOMY TUBE PLACEMENT         FAMILY HISTORY    Family History   Problem Relation Age of Onset    Ovarian Cancer Mother     Bipolar Disorder Mother     Lung Cancer Father     Other Brother         cerebral palsy    Diabetes Maternal Grandmother     Diabetes Paternal Grandmother        SOCIAL HISTORY    Social History     Tobacco Use    Smoking status: Former Smoker    Smokeless tobacco: Never Used   Vaping Use    Vaping Use: Never used   Substance Use Topics    Alcohol use: Not Currently     Comment: occasional    Drug use: Not Currently     Types: Marijuana (Weed)     Comment: in the past- marijuana Many years ago       ALLERGIES    Allergies   Allergen Reactions    Amoxicillin Diarrhea       MEDICATIONS    Current Outpatient Medications on File Prior to Encounter   Medication Sig Dispense Refill    cephALEXin (KEFLEX) 500 MG capsule Take 1 capsule by mouth 4 times daily for 10 days 40 capsule 0    sulfamethoxazole-trimethoprim (BACTRIM DS) 800-160 MG per tablet Take 1 tablet by mouth 2 times daily for 10 days 20 tablet 0    HYDROcodone-acetaminophen (NORCO) 5-325 MG per tablet Take 1 tablet by mouth every 6 hours as needed for Pain for up to 3 days. Intended supply: 3 days. Take lowest dose possible to manage pain 6 tablet 0    ARIPiprazole (ABILIFY) 5 MG tablet Take 7.5 mg by mouth daily      PARoxetine (PAXIL) 40 MG tablet Take 40 mg by mouth daily      gemfibrozil (LOPID) 600 MG tablet Take 1 tablet by mouth 2 times daily 60 tablet 2    traZODone (DESYREL) 50 MG tablet nightly PRN      methylphenidate (RITALIN) 20 MG tablet Take one and one-half a day       No current facility-administered medications on file prior to encounter. REVIEW OF SYSTEMS    Pertinent items are noted in HPI. Constitutional: Negative for systemic symptoms including fever, chills and malaise.       Objective:      /68   Pulse 77   Temp 98 °F (36.7 °C) (Temporal)   Resp 16     PHYSICAL EXAM      General: The patient is in no acute distress. Mental status:  Patient is appropriate, is  oriented to place and plan of care. Dermatologic exam: Visual inspection of the periwound reveals the skin to be normal in turgor and texture and dry  Wound exam: see wound description below in procedure note      Assessment:     Problem List Items Addressed This Visit        Other    WD-Nonhealing surgical wound - Primary    Relevant Orders    Initiate Outpatient Wound Care Protocol    WD-Complicated open wound of right hip    Relevant Orders    Initiate Outpatient Wound Care Protocol        Procedure Note    Indications:  Based on my examination of this patient's wound(s) today, sharp excision into necrotic subcutaneous tissue is required to promote healing and evaluate the extent of previous healing. Performed by: LISBET Claudio CNP    Consent obtained: Yes    Time out taken:  Yes    Pain Control: N/A       Debridement:Excisional Debridement    Using curette the wound(s) was/were sharply debrided down through and including the removal of subcutaneous tissue. Devitalized Tissue Debrided:  fibrin, biofilm, slough, necrotic/eschar and exudate    Pre Debridement Measurements:  Are located in the Wound Documentation Flow Sheet    All active wounds listed below with today's date are evaluated  Wound(s)    debrided this date include # : 1     Post  Debridement Measurements:  Negative Pressure Wound Therapy Leg Anterior;Right;Upper (Active)   Wound Type Surgical 05/12/22 0913   Unit Type kci 05/12/22 0913   Dressing Type Black Foam;White Foam 05/12/22 0913   Number of pieces used 4 05/09/22 2241   Number of pieces removed 2 05/11/22 1018   Cycle Continuous 05/12/22 0913   Target Pressure (mmHg) 125 05/12/22 0913   Canister changed?  No 05/12/22 0913   Dressing Status Clean, dry & intact 05/12/22 0913   Dressing Changed Changed/New 05/11/22 1018   Drainage Amount Moderate 05/11/22 1018   Drainage Description Serosanguinous 05/12/22 0913   Dressing Change Due 05/13/22 05/11/22 1018   Wound Assessment Pink 05/12/22 0913   Odor None 05/11/22 1018   Number of days: 16       Wound 05/11/22 Thigh Anterior;Right (Active)   Wound Image   05/11/22 1018   Wound Etiology Non-Healing Surgical 05/11/22 1018   Dressing Status Clean;Dry; Intact 05/12/22 0913   Wound Cleansed Cleansed with saline 05/11/22 1018   Dressing/Treatment Negative pressure wound therapy 05/12/22 0913   Wound Length (cm) 11.5 cm 05/11/22 1018   Wound Width (cm) 6 cm 05/11/22 1018   Wound Depth (cm) 3 cm 05/11/22 1018   Wound Surface Area (cm^2) 69 cm^2 05/11/22 1018   Wound Volume (cm^3) 207 cm^3 05/11/22 1018   Distance Tunneling (cm) 0 cm 05/11/22 1018   Tunneling Position ___ O'Clock 0 05/11/22 1018   Undermining Starts ___ O'Clock 12 05/11/22 1018   Undermining Ends___ O'Clock 12 05/11/22 1018   Undermining Maxium Distance (cm) 5 05/11/22 1018   Wound Assessment Pink/red;Subcutaneous 05/11/22 1018   Drainage Amount Moderate 05/11/22 1018   Drainage Description Serosanguinous 05/11/22 1018   Odor None 05/11/22 1018   Fatoumata-wound Assessment Intact 05/11/22 1018   Margins Defined edges 05/11/22 1018   Wound Thickness Description not for Pressure Injury Full thickness 05/11/22 1018   Number of days: 14       Wound 05/18/22 Wound #1 Right hip (Active)   Wound Image   05/18/22 1454   Wound Etiology Traumatic 05/25/22 1431   Wound Cleansed Wound cleanser 05/25/22 1431   Wound Length (cm) 11.6 cm 05/25/22 1431   Wound Width (cm) 4.8 cm 05/25/22 1431   Wound Depth (cm) 1.8 cm 05/25/22 1431   Wound Surface Area (cm^2) 55.68 cm^2 05/25/22 1431   Change in Wound Size % (l*w) 2.66 05/25/22 1431   Wound Volume (cm^3) 100.224 cm^3 05/25/22 1431   Wound Healing % 12 05/25/22 1431   Post-Procedure Length (cm) 11.6 cm 05/25/22 1450   Post-Procedure Width (cm) 4.8 cm 05/25/22 1450   Post-Procedure Depth (cm) 1.8 cm 05/25/22 1450 Post-Procedure Surface Area (cm^2) 55.68 cm^2 05/25/22 1450   Post-Procedure Volume (cm^3) 100.224 cm^3 05/25/22 1450   Distance Tunneling (cm) 0 cm 05/25/22 1431   Tunneling Position ___ O'Clock 0 05/25/22 1431   Undermining Starts ___ O'Clock 1500 05/25/22 1431   Undermining Ends___ O'Clock 1800 05/25/22 1431   Undermining Maxium Distance (cm) 0.5 05/25/22 1431   Wound Assessment Pink/red;Slough 05/25/22 1431   Drainage Amount Moderate 05/25/22 1431   Drainage Description Yellow 05/25/22 1431   Odor None 05/25/22 1431   Fatoumata-wound Assessment Intact 05/25/22 1431   Margins Defined edges 05/25/22 1431   Wound Thickness Description not for Pressure Injury Full thickness 05/25/22 1431   Number of days: 7       Percent of Wound(s) Debrided: approximately 100%    Total  Area  Debrided:  55.68 sq cm     Bleeding:  Minimal    Hemostasis Achieved:  by pressure    Procedural Pain:  0  / 10     Post Procedural Pain:  0 / 10     Response to treatment:  Well tolerated by patient. Status of wound progress and description from last visit:   Updated orders for home health. Patient still waiting on supplies. Will apply for grafts at this time       Plan:       Discharge Instructions          [address]     NOTE: Upon discharge from the [address], you will receive a patient experience survey.  We would be grateful if you would take the time to fill this survey out.     Wound care order history:                               Vascular studies:   Date               Imaging:   Date               Cultures:   Date               Grafts:  Date               Antibiotics:               Earlier Wound care treatments:                           Primary care physician:      Continuing wound care orders and information:              Residence:                Continue home health care with:     Wound Medications:              Wound cleansing:                           Do not scrub or use excessive force. Wash hands with soap and water before and after dressing changes. Prior to applying a clean dressing, cleanse wound with normal saline,                                wound cleanser, or mild soap and water. Ask the physician or nurse before getting the wound(s) wet in a shower              Daily Wound management:                          Keep weight off wounds and reposition every 2 hours. Avoid standing for long periods of time. Apply wraps/stockings in AM and remove at bedtime. If swelling is present, elevate legs to the level of the heart or above for 30                              minutes 4-5 times a day and/or when sitting. When taking antibiotics take entire prescription as ordered by physician                             do not stop taking until medicine is all gone.                                                       Orders for this week:      FAX TO [facility]     Right Lateral thigh-    APPLY  GENTAMICIN and sania TO WOUND BED AND PACK WITH KERLEX AND COVER WITH SORBREX OR ABD AND HYPERFIX TAPE. Wrap with coban change daily       Wound vac ---  WOUND VAC THERAPY:     DUODERM TO PERIWOUND FOR PROTECTION. APPLY BLACK FOAM TO WOUND  BED . SECURE VAC. DRESSING WITH DERMATAC DRAPE. SET WOUND VAC  CONTINUOUS SUCTION. CANISTER CHANGE WITH EACH DRESSING CHANGE OR ACCORDING TO VOLUME OF DRAINAGE. WOUND VAC DRESSING TO BE CHANGED MON, WED, FRI    Auth for graft on 2022      RX:  Consult:  [phone]     Follow up with Sahil Talamantes CNP  In 1 weeks in the wound care center  Call [phone] for any questions or concerns.   Date__________   Time____________        Signature____________________________________________________             Treatment Note Wound 05/18/22 Wound #1 Right hip-Dressing/Treatment:  (gentamicin sania kerlix sorbex tape)    Written Patient Dismissal Instructions Given            Electronically signed by LISBET Simons CNP on 5/25/2022 at 3:37 PM

## 2022-05-27 ENCOUNTER — OFFICE VISIT (OUTPATIENT)
Dept: SURGERY | Age: 37
End: 2022-05-27

## 2022-05-27 VITALS
OXYGEN SATURATION: 100 % | SYSTOLIC BLOOD PRESSURE: 126 MMHG | WEIGHT: 200 LBS | HEIGHT: 72 IN | BODY MASS INDEX: 27.09 KG/M2 | HEART RATE: 80 BPM | DIASTOLIC BLOOD PRESSURE: 80 MMHG

## 2022-05-27 DIAGNOSIS — S71.001D COMPLICATED OPEN WOUND OF RIGHT HIP, SUBSEQUENT ENCOUNTER: Primary | ICD-10-CM

## 2022-05-27 PROCEDURE — 99024 POSTOP FOLLOW-UP VISIT: CPT | Performed by: SURGERY

## 2022-05-27 NOTE — PROGRESS NOTES
Chief Complaint   Patient presents with    Post-Op Check     1st po right lat thigh lesion 5/5/22          SUBJECTIVE:  Patient here for post op visit. S/p washout of hematoma. Wound vac placement. Has followed with wound care. Has elected for topical dressing. Overall pain in RLE improved and able to walk better on it with less pain at end of day.         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     L5, S1    Depression     Hyperlipidemia     Neuropathy 2020    Seizures ([facility] [address]      Family History   Problem Relation Age of Onset    Ovarian Cancer Mother     Bipolar Disorder Mother     Lung Cancer Father     Other Brother         cerebral palsy    Diabetes Maternal Grandmother     Diabetes Paternal Grandmother      Social History     Socioeconomic History    Marital status: Legally      Spouse name: Not on file    Number of children: 3    Years of education: Not on file    Highest education level: Not on file   Occupational History    Occupation: Landscaping   Tobacco Use    Smoking status: Former Smoker    Smokeless tobacco: Never Used   Vaping Use    Vaping Use: Never used   Substance and Sexual Activity    Alcohol use: Not Currently     Comment: occasional    Drug use: Not Currently     Types: Marijuana (Weed)     Comment: in the past- marijuana Many years ago    Sexual activity: Yes     Partners: Female   Other Topics Concern    Not on file   Social History Narrative    Not on file     Social Determinants of Health     Financial Resource Strain: Low Risk     Difficulty of Paying Living Expenses: Not hard at all   Food Insecurity: No Food Insecurity    Worried About Running Out of Food in the Last Year: Never true    [address] in the Last Year: Never true   Transportation Needs:     Lack of Transportation (Medical): Not on file    Lack of Transportation (Non-Medical): Not on file   Physical Activity:     Days of Exercise per Week: Not on file    Minutes of Exercise per Session: Not on file   Stress:     Feeling of Stress : Not on file   Social Connections:     Frequency of Communication with Friends and Family: Not on file    Frequency of Social Gatherings with Friends and Family: Not on file    Attends [employer] Services: Not on file    Active Member of [address] [place] [address] [employer] or Organizations: Not on file    Attends Club or Organization Meetings: Not on file    Marital Status: Not on file   Intimate Partner Violence:     Fear of Current or Ex-Partner: Not on file    Emotionally Abused: Not on file    Physically Abused: Not on file    Sexually Abused: Not on file   Housing Stability:     Unable to Pay for Housing in the Last Year: Not on file    Number of [place] in the Last Year: Not on file    Unstable Housing in the Last Year: Not on file       OBJECTIVE:    General: A&O x3  Respiratory: Chest rise equal bilaterally  CV: Regular rate and rhythm  Abdomen: Soft, nontender, nondistended, no rebound or guarding. Right thigh wound with clean wound base. ASSESSMENT:    1. Complicated open wound of right hip, subsequent encounter        PLAN:    Follow up with wound care   Follow-up with me as needed      No orders of the defined types were placed in this encounter. No orders of the defined types were placed in this encounter.        Follow Up: [unfilled]    Oswald Maxwell DO

## 2022-06-03 ENCOUNTER — HOSPITAL ENCOUNTER (OUTPATIENT)
Dept: WOUND CARE | Age: 37
Discharge: HOME | End: 2022-06-03
Payer: MEDICARE

## 2022-06-03 VITALS
RESPIRATION RATE: 16 BRPM | TEMPERATURE: 98.3 F | SYSTOLIC BLOOD PRESSURE: 117 MMHG | HEART RATE: 75 BPM | DIASTOLIC BLOOD PRESSURE: 77 MMHG

## 2022-06-03 DIAGNOSIS — T81.89XA NON-HEALING SURGICAL WOUND, INITIAL ENCOUNTER: Primary | ICD-10-CM

## 2022-06-03 DIAGNOSIS — S71.001A COMPLICATED OPEN WOUND OF RIGHT HIP, INITIAL ENCOUNTER: ICD-10-CM

## 2022-06-03 PROCEDURE — 11045 DBRDMT SUBQ TISS EACH ADDL: CPT | Performed by: NURSE PRACTITIONER

## 2022-06-03 PROCEDURE — 11042 DBRDMT SUBQ TIS 1ST 20SQCM/<: CPT

## 2022-06-03 PROCEDURE — 11042 DBRDMT SUBQ TIS 1ST 20SQCM/<: CPT | Performed by: NURSE PRACTITIONER

## 2022-06-03 PROCEDURE — 11045 DBRDMT SUBQ TISS EACH ADDL: CPT

## 2022-06-03 RX ORDER — LIDOCAINE 40 MG/G
CREAM TOPICAL ONCE
Status: CANCELLED | OUTPATIENT
Start: 2022-06-03 | End: 2022-06-03

## 2022-06-03 RX ORDER — GENTAMICIN SULFATE 1 MG/G
OINTMENT TOPICAL ONCE
Status: CANCELLED | OUTPATIENT
Start: 2022-06-03 | End: 2022-06-03

## 2022-06-03 RX ORDER — GENTAMICIN SULFATE 1 MG/G
OINTMENT TOPICAL ONCE
Status: DISCONTINUED | OUTPATIENT
Start: 2022-06-03 | End: 2022-06-04 | Stop reason: HOSPADM

## 2022-06-03 RX ORDER — BACITRACIN ZINC AND POLYMYXIN B SULFATE 500; 1000 [USP'U]/G; [USP'U]/G
OINTMENT TOPICAL ONCE
Status: CANCELLED | OUTPATIENT
Start: 2022-06-03 | End: 2022-06-03

## 2022-06-03 RX ORDER — LIDOCAINE 50 MG/G
OINTMENT TOPICAL ONCE
Status: CANCELLED | OUTPATIENT
Start: 2022-06-03 | End: 2022-06-03

## 2022-06-03 RX ORDER — LIDOCAINE HYDROCHLORIDE 40 MG/ML
SOLUTION TOPICAL ONCE
Status: CANCELLED | OUTPATIENT
Start: 2022-06-03 | End: 2022-06-03

## 2022-06-03 RX ORDER — CLOBETASOL PROPIONATE 0.5 MG/G
OINTMENT TOPICAL ONCE
Status: CANCELLED | OUTPATIENT
Start: 2022-06-03 | End: 2022-06-03

## 2022-06-03 RX ORDER — LIDOCAINE HYDROCHLORIDE 20 MG/ML
JELLY TOPICAL ONCE
Status: CANCELLED | OUTPATIENT
Start: 2022-06-03 | End: 2022-06-03

## 2022-06-03 RX ORDER — GENTAMICIN SULFATE 1 MG/G
OINTMENT TOPICAL
Qty: 30 G | Refills: 1 | Status: SHIPPED | OUTPATIENT
Start: 2022-06-03 | End: 2022-06-10

## 2022-06-03 RX ORDER — BETAMETHASONE DIPROPIONATE 0.05 %
OINTMENT (GRAM) TOPICAL ONCE
Status: CANCELLED | OUTPATIENT
Start: 2022-06-03 | End: 2022-06-03

## 2022-06-03 RX ORDER — GINSENG 100 MG
CAPSULE ORAL ONCE
Status: CANCELLED | OUTPATIENT
Start: 2022-06-03 | End: 2022-06-03

## 2022-06-03 RX ORDER — BACITRACIN, NEOMYCIN, POLYMYXIN B 400; 3.5; 5 [USP'U]/G; MG/G; [USP'U]/G
OINTMENT TOPICAL ONCE
Status: CANCELLED | OUTPATIENT
Start: 2022-06-03 | End: 2022-06-03

## 2022-06-03 ASSESSMENT — PAIN - FUNCTIONAL ASSESSMENT: PAIN_FUNCTIONAL_ASSESSMENT: PREVENTS OR INTERFERES SOME ACTIVE ACTIVITIES AND ADLS

## 2022-06-03 ASSESSMENT — PAIN SCALES - GENERAL: PAINLEVEL_OUTOF10: 2

## 2022-06-03 ASSESSMENT — PAIN DESCRIPTION - ONSET: ONSET: ON-GOING

## 2022-06-03 ASSESSMENT — PAIN DESCRIPTION - ORIENTATION: ORIENTATION: RIGHT

## 2022-06-03 ASSESSMENT — PAIN DESCRIPTION - FREQUENCY: FREQUENCY: CONTINUOUS

## 2022-06-03 ASSESSMENT — PAIN DESCRIPTION - LOCATION: LOCATION: LEG

## 2022-06-03 ASSESSMENT — PAIN DESCRIPTION - DESCRIPTORS: DESCRIPTORS: ITCHING

## 2022-06-03 ASSESSMENT — PAIN DESCRIPTION - PAIN TYPE: TYPE: ACUTE PAIN

## 2022-06-03 NOTE — PROGRESS NOTES
Wound Care Center Progress Note With Procedure    Thelma Aaron  AGE: 39 y.o. GENDER: male  : 1985  EPISODE DATE:  6/3/2022     Subjective:     Chief Complaint   Patient presents with    Wound Check     right thigh         HISTORY of PRESENT ILLNESS      Catarino Michel is a 39 y. o. male who presents to the Wound Clinic for a visit for evaluation and treatment of Chronic non-healing surgical and traumatic  ulcer(s) of  Right upper lateral leg / right hip.  The condition is of moderate severity. The ulcer has been present for 2 weeks, but this issue started last July when he was bitten by a dog.  The underlying cause is thought to be trauma and nonhealing surgical.  The patients care to date has included JEFFERSON drain, which became infected. The patient has significant underlying medical conditions as below.      Patient very good progress this week. Requests to stay off NPT, but is willing to go back if progress stalls. Denied for grafts.   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     L5, S1    Depression     Hyperlipidemia     Neuropathy 2020    Seizures ([facility] [address]        PAST SURGICAL HISTORY    Past Surgical History:   Procedure Laterality Date    EYE SURGERY      for amblyopia    LEG SURGERY Right 2022    RIGHT THIGH DEBRIDEMENT INCISION AND DRAINAGE AND WASHOUT AND WOUND VAC PLACEMENT performed by Anisa Lackey DO at [address] [address] Right 2022    RIGHT LATERAL THIGH LESION BIOPSY EXCISION performed by Anisa Lackey DO at [address] [facility] Relevant Orders    Initiate Outpatient Wound Care Protocol    WD-Complicated open wound of right hip    Relevant Orders    Initiate Outpatient Wound Care Protocol        Procedure Note    Indications:  Based on my examination of this patient's wound(s) today, sharp excision into necrotic subcutaneous tissue is required to promote healing and evaluate the extent of previous healing. Performed by: LISBET Allred CNP    Consent obtained: Yes    Time out taken:  Yes    Pain Control: N/A       Debridement:Excisional Debridement    Using #15 blade scalpel the wound(s) was/were sharply debrided down through and including the removal of subcutaneous tissue. Devitalized Tissue Debrided:  fibrin, biofilm, slough and exudate    Pre Debridement Measurements:  Are located in the Wound Documentation Flow Sheet    All active wounds listed below with today's date are evaluated  Wound(s)    debrided this date include # : 1     Post  Debridement Measurements:  Negative Pressure Wound Therapy Leg Anterior;Right;Upper (Active)   Wound Type Surgical 05/12/22 0913   Unit Type kci 05/12/22 0913   Dressing Type Black Foam;White Foam 05/12/22 0913   Number of pieces used 4 05/09/22 2241   Number of pieces removed 2 05/11/22 1018   Cycle Continuous 05/12/22 0913   Target Pressure (mmHg) 125 05/12/22 0913   Canister changed?  No 05/12/22 0913   Dressing Status Clean, dry & intact 05/12/22 0913   Dressing Changed Changed/New 05/11/22 1018   Drainage Amount Moderate 05/11/22 1018   Drainage Description Serosanguinous 05/12/22 0913   Dressing Change Due 05/13/22 05/11/22 1018   Wound Assessment Pink 05/12/22 0913   Odor None 05/11/22 1018   Number of days: 24       Wound 05/18/22 #1 Right Proximal Lateral Thigh (Active)   Wound Image   06/03/22 0818   Wound Etiology Traumatic 06/03/22 0818   Wound Cleansed Wound cleanser 06/03/22 0818   Offloading for Diabetic Foot Ulcers Offloading not required 06/03/22 0818   Wound Length (cm) 10.4 cm 06/03/22 0818   Wound Width (cm) 4 cm 06/03/22 0818   Wound Depth (cm) 1.1 cm 06/03/22 0818   Wound Surface Area (cm^2) 41.6 cm^2 06/03/22 0818   Change in Wound Size % (l*w) 27.27 06/03/22 0818   Wound Volume (cm^3) 45.76 cm^3 06/03/22 0818   Wound Healing % 60 06/03/22 0818   Post-Procedure Length (cm) 10.4 cm 06/03/22 0840   Post-Procedure Width (cm) 4 cm 06/03/22 0840   Post-Procedure Depth (cm) 1.1 cm 06/03/22 0840   Post-Procedure Surface Area (cm^2) 41.6 cm^2 06/03/22 0840   Post-Procedure Volume (cm^3) 45.76 cm^3 06/03/22 0840   Distance Tunneling (cm) 0 cm 06/03/22 0818   Tunneling Position ___ O'Clock 0 06/03/22 0818   Undermining Starts ___ O'Clock 0300 06/03/22 0818   Undermining Ends___ O'Clock 0400 06/03/22 0818   Undermining Maxium Distance (cm) 0.4 06/03/22 0818   Wound Assessment Granulation tissue 06/03/22 0818   Drainage Amount Moderate 06/03/22 0818   Drainage Description Serosanguinous 06/03/22 0818   Odor None 06/03/22 0818   Fatoumata-wound Assessment Intact 06/03/22 0818   Margins Defined edges 06/03/22 0818   Wound Thickness Description not for Pressure Injury Full thickness 06/03/22 0818   Number of days: 15       Percent of Wound(s) Debrided: approximately 100%    Total  Area  Debrided:  41.6 sq cm     Bleeding:  Minimal    Hemostasis Achieved:  by pressure    Procedural Pain:  0  / 10     Post Procedural Pain:  0 / 10     Response to treatment:  Well tolerated by patient. Status of wound progress and description from last visit:   Improving. Follow up 1 week and continue to monitor       Plan:       Discharge Instructions       [address]     NOTE: Upon discharge from the [address], you will receive a patient experience survey.  We would be grateful if you would take the time to fill this survey out.     Wound care order history:                               Vascular studies:   Date               Imaging:   Date               Cultures:   Date               Grafts:  Date               Antibiotics:               Earlier Wound care treatments:                           Primary care physician:      Continuing wound care orders and information:              Residence:                Continue home health care with:               Wound Medications:              BCZRO cleansing:                           HL not scrub or use excessive force.                          Wash hands with soap and water before and after dressing changes.                         Prior to applying a clean dressing, cleanse wound with normal saline,                                wound cleanser, or mild soap and water.                           Ask the physician or nurse before getting the wound(s) wet in a shower              Daily Wound management:                          Keep weight off wounds and reposition every 2 hours.                          Avoid standing for long periods of time.                          MTPMJ wraps/stockings in AM and remove at bedtime.                          If swelling is present, elevate legs to the level of the heart or above for 30 minutes 4-5 times a day and/or when sitting.                                             When taking antibiotics take entire prescription as ordered by physician do not stop taking until medicine is all gone.                                                       Orders for this week: 6/2/22       FAX TO [facility]     Right Lateral thigh - APPLY GENTAMICIN and Loyda TO WOUND BED AND PACK WITH SALINE DAMP [address] [place]. Wrap with coban. Change daily.        Wound vac on hold as of 6/3/22     Applied for graft on 5/25/2022      Rx:   Consult:  Central Scheduling: [phone]     Follow up with Brandon Alamo CNP in 1 week in the wound care center  Call [phone] for any questions or concerns.   Date__________   Time___________        Treatment Note      Written Patient Dismissal Instructions Given            Electronically signed by LISBET Sanford CNP on 6/3/2022 at 8:45 AM

## 2022-06-10 ENCOUNTER — HOSPITAL ENCOUNTER (OUTPATIENT)
Dept: WOUND CARE | Age: 37
Discharge: HOME | End: 2022-06-10
Payer: MEDICARE

## 2022-06-10 VITALS
SYSTOLIC BLOOD PRESSURE: 106 MMHG | HEART RATE: 66 BPM | TEMPERATURE: 97.3 F | RESPIRATION RATE: 16 BRPM | DIASTOLIC BLOOD PRESSURE: 72 MMHG

## 2022-06-10 DIAGNOSIS — S71.001D COMPLICATED OPEN WOUND OF RIGHT HIP, SUBSEQUENT ENCOUNTER: Primary | ICD-10-CM

## 2022-06-10 DIAGNOSIS — T81.89XD NON-HEALING SURGICAL WOUND, SUBSEQUENT ENCOUNTER: ICD-10-CM

## 2022-06-10 DIAGNOSIS — T81.89XA NON-HEALING SURGICAL WOUND, INITIAL ENCOUNTER: ICD-10-CM

## 2022-06-10 DIAGNOSIS — S71.001A COMPLICATED OPEN WOUND OF RIGHT HIP, INITIAL ENCOUNTER: ICD-10-CM

## 2022-06-10 PROCEDURE — 11042 DBRDMT SUBQ TIS 1ST 20SQCM/<: CPT

## 2022-06-10 PROCEDURE — 11045 DBRDMT SUBQ TISS EACH ADDL: CPT | Performed by: NURSE PRACTITIONER

## 2022-06-10 PROCEDURE — 11045 DBRDMT SUBQ TISS EACH ADDL: CPT

## 2022-06-10 PROCEDURE — 11042 DBRDMT SUBQ TIS 1ST 20SQCM/<: CPT | Performed by: NURSE PRACTITIONER

## 2022-06-10 RX ORDER — GENTAMICIN SULFATE 1 MG/G
OINTMENT TOPICAL ONCE
Status: DISCONTINUED | OUTPATIENT
Start: 2022-06-10 | End: 2022-06-11 | Stop reason: HOSPADM

## 2022-06-10 RX ORDER — LIDOCAINE HYDROCHLORIDE 20 MG/ML
JELLY TOPICAL ONCE
Status: CANCELLED | OUTPATIENT
Start: 2022-06-10 | End: 2022-06-10

## 2022-06-10 RX ORDER — LIDOCAINE HYDROCHLORIDE 40 MG/ML
SOLUTION TOPICAL ONCE
Status: CANCELLED | OUTPATIENT
Start: 2022-06-10 | End: 2022-06-10

## 2022-06-10 RX ORDER — GINSENG 100 MG
CAPSULE ORAL ONCE
Status: CANCELLED | OUTPATIENT
Start: 2022-06-10 | End: 2022-06-10

## 2022-06-10 RX ORDER — CLOBETASOL PROPIONATE 0.5 MG/G
OINTMENT TOPICAL ONCE
Status: CANCELLED | OUTPATIENT
Start: 2022-06-10 | End: 2022-06-10

## 2022-06-10 RX ORDER — BACITRACIN, NEOMYCIN, POLYMYXIN B 400; 3.5; 5 [USP'U]/G; MG/G; [USP'U]/G
OINTMENT TOPICAL ONCE
Status: CANCELLED | OUTPATIENT
Start: 2022-06-10 | End: 2022-06-10

## 2022-06-10 RX ORDER — BETAMETHASONE DIPROPIONATE 0.05 %
OINTMENT (GRAM) TOPICAL ONCE
Status: CANCELLED | OUTPATIENT
Start: 2022-06-10 | End: 2022-06-10

## 2022-06-10 RX ORDER — BACITRACIN ZINC AND POLYMYXIN B SULFATE 500; 1000 [USP'U]/G; [USP'U]/G
OINTMENT TOPICAL ONCE
Status: CANCELLED | OUTPATIENT
Start: 2022-06-10 | End: 2022-06-10

## 2022-06-10 RX ORDER — LIDOCAINE 40 MG/G
CREAM TOPICAL ONCE
Status: CANCELLED | OUTPATIENT
Start: 2022-06-10 | End: 2022-06-10

## 2022-06-10 RX ORDER — GENTAMICIN SULFATE 1 MG/G
OINTMENT TOPICAL ONCE
Status: CANCELLED | OUTPATIENT
Start: 2022-06-10 | End: 2022-06-10

## 2022-06-10 RX ORDER — LIDOCAINE 50 MG/G
OINTMENT TOPICAL ONCE
Status: CANCELLED | OUTPATIENT
Start: 2022-06-10 | End: 2022-06-10

## 2022-06-10 ASSESSMENT — PAIN SCALES - GENERAL: PAINLEVEL_OUTOF10: 0

## 2022-06-10 NOTE — PROGRESS NOTES
Wound Care Center Progress Note With Procedure    Alice Bucio  AGE: 39 y.o. GENDER: male  : 1985  EPISODE DATE:  6/10/2022     Subjective:     Chief Complaint   Patient presents with    Wound Check     right thigh         HISTORY of PRESENT ILLNESS      Catarino Michel is a 39 y. o. male who presents to the Wound Clinic for a visit for evaluation and treatment of Chronic non-healing surgical and traumatic  ulcer(s) of  Right upper lateral leg / right hip.  The condition is of moderate severity. The ulcer has been present for 2 weeks, but this issue started last July when he was bitten by a dog.  The underlying cause is thought to be trauma and nonhealing surgical.  The patients care to date has included JEFFERSON drain, which became infected. The patient has significant underlying medical conditions as below.      Continued improvement. We can d/c NPT at this time. Wound clean and dry.  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     L5, S1    Depression     Hyperlipidemia     Neuropathy 2020    Seizures ([facility] [address]        PAST SURGICAL HISTORY    Past Surgical History:   Procedure Laterality Date    EYE SURGERY      for amblyopia    LEG SURGERY Right 2022    RIGHT THIGH DEBRIDEMENT INCISION AND DRAINAGE AND WASHOUT AND WOUND VAC PLACEMENT performed by Pop Rinalid DO at [address] [place] [address] Right 2022    RIGHT LATERAL THIGH LESION BIOPSY EXCISION performed by Pop Rinaldi DO at [place] [address]      TYMPANOSTOMY TUBE PLACEMENT         FAMILY HISTORY    Family History Problem Relation Age of Onset    Ovarian Cancer Mother     Bipolar Disorder Mother     Lung Cancer Father     Other Brother         cerebral palsy    Diabetes Maternal Grandmother     Diabetes Paternal Grandmother        SOCIAL HISTORY    Social History     Tobacco Use    Smoking status: Former Smoker    Smokeless tobacco: Never Used   Vaping Use    Vaping Use: Never used   Substance Use Topics    Alcohol use: Not Currently     Comment: occasional    Drug use: Not Currently     Types: Marijuana (Weed)     Comment: in the past- marijuana Many years ago       ALLERGIES    Allergies   Allergen Reactions    Amoxicillin Diarrhea       MEDICATIONS    Current Outpatient Medications on File Prior to Encounter   Medication Sig Dispense Refill    gentamicin (GARAMYCIN) 0.1 % ointment Apply to wound bed daily and as needed 30 g 1    ARIPiprazole (ABILIFY) 5 MG tablet Take 7.5 mg by mouth daily      PARoxetine (PAXIL) 40 MG tablet Take 40 mg by mouth daily      gemfibrozil (LOPID) 600 MG tablet Take 1 tablet by mouth 2 times daily 60 tablet 2    traZODone (DESYREL) 50 MG tablet nightly PRN      methylphenidate (RITALIN) 20 MG tablet Take one and one-half a day       No current facility-administered medications on file prior to encounter. REVIEW OF SYSTEMS    Pertinent items are noted in HPI. Constitutional: Negative for systemic symptoms including fever, chills and malaise. Objective:      /72   Pulse 66   Temp 97.3 °F (36.3 °C) (Temporal)   Resp 16     PHYSICAL EXAM      General: The patient is in no acute distress. Mental status:  Patient is appropriate, is  oriented to place and plan of care.   Dermatologic exam: Visual inspection of the periwound reveals the skin to be normal in turgor and texture and dry  Wound exam: see wound description below in procedure note      Assessment:     Problem List Items Addressed This Visit        Other    WD-Nonhealing surgical wound WD-Complicated open wound of right hip - Primary        Procedure Note    Indications:  Based on my examination of this patient's wound(s) today, sharp excision into necrotic subcutaneous tissue is required to promote healing and evaluate the extent of previous healing. Performed by: LISBET Lobo CNP    Consent obtained: Yes    Time out taken:  Yes    Pain Control: N/A       Debridement:Excisional Debridement    Using #15 blade scalpel the wound(s) was/were sharply debrided down through and including the removal of subcutaneous tissue. Devitalized Tissue Debrided:  fibrin, biofilm and slough    Pre Debridement Measurements:  Are located in the Wound Documentation Flow Sheet    All active wounds listed below with today's date are evaluated  Wound(s)    debrided this date include # : 1     Post  Debridement Measurements:  Negative Pressure Wound Therapy Leg Anterior;Right;Upper (Active)   Wound Type Surgical 05/12/22 0913   Unit Type kci 05/12/22 0913   Dressing Type Black Foam;White Foam 05/12/22 0913   Number of pieces used 4 05/09/22 2241   Number of pieces removed 2 05/11/22 1018   Cycle Continuous 05/12/22 0913   Target Pressure (mmHg) 125 05/12/22 0913   Canister changed?  No 05/12/22 0913   Dressing Status Clean, dry & intact 05/12/22 0913   Dressing Changed Changed/New 05/11/22 1018   Drainage Amount Moderate 05/11/22 1018   Drainage Description Serosanguinous 05/12/22 0913   Dressing Change Due 05/13/22 05/11/22 1018   Wound Assessment Pink 05/12/22 0913   Odor None 05/11/22 1018   Number of days: 31       Wound 05/18/22 #1 Right Proximal Lateral Thigh (Active)   Wound Image   06/10/22 0808   Wound Etiology Traumatic 06/03/22 0818   Wound Cleansed Wound cleanser 06/10/22 0808   Offloading for Diabetic Foot Ulcers Offloading not required 06/10/22 0808   Wound Length (cm) 9 cm 06/10/22 0808   Wound Width (cm) 3 cm 06/10/22 0808   Wound Depth (cm) 0.6 cm 06/10/22 0808   Wound Surface Area (cm^2) 27 cm^2 06/10/22 0808   Change in Wound Size % (l*w) 52.8 06/10/22 0808   Wound Volume (cm^3) 16.2 cm^3 06/10/22 0808   Wound Healing % 86 06/10/22 0808   Post-Procedure Length (cm) 9 cm 06/10/22 0822   Post-Procedure Width (cm) 3 cm 06/10/22 2125   Post-Procedure Depth (cm) 0.6 cm 06/10/22 0822   Post-Procedure Surface Area (cm^2) 27 cm^2 06/10/22 0822   Post-Procedure Volume (cm^3) 16.2 cm^3 06/10/22 0822   Distance Tunneling (cm) 0 cm 06/10/22 0808   Tunneling Position ___ O'Clock 0 06/10/22 0808   Undermining Starts ___ O'Clock 0 06/10/22 0808   Undermining Ends___ O'Clock 0 06/10/22 0808   Undermining Maxium Distance (cm) 0 06/10/22 0808   Wound Assessment Granulation tissue 06/10/22 0808   Drainage Amount Moderate 06/10/22 0808   Drainage Description Serosanguinous 06/10/22 0808   Odor None 06/10/22 0808   Fatoumata-wound Assessment Intact 06/10/22 0808   Margins Defined edges 06/10/22 0808   Wound Thickness Description not for Pressure Injury Full thickness 06/10/22 0808   Number of days: 22       Percent of Wound(s) Debrided: approximately 100%    Total  Area  Debrided:  27 sq cm     Bleeding:  Minimal    Hemostasis Achieved:  by pressure    Procedural Pain:  0  / 10     Post Procedural Pain:  0 / 10     Response to treatment:  Well tolerated by patient. Status of wound progress and description from last visit:   Improved. Patient to send NPT device back. Continue to monitor and follow up 1 week  Patient working some hours during the week, this is fine       Plan:       Discharge Instructions       [address]     NOTE: Upon discharge from the [address], you will receive a patient experience survey.  We would be grateful if you would take the time to fill this survey out.     Wound care order history:                               Vascular studies:   Date               Imaging:   Date               Cultures:   Date               Grafts:  Date             Antibiotics:               Earlier Wound care treatments:                           Primary care physician:      Continuing wound care orders and information:              Residence:                Continue home health care with:               Wound Medications:              ZXUZP cleansing:                           MV not scrub or use excessive force.                          Wash hands with soap and water before and after dressing changes.                         Prior to applying a clean dressing, cleanse wound with normal saline,                                wound cleanser, or mild soap and water.                           Ask the physician or nurse before getting the wound(s) wet in a shower              Daily Wound management:                          Keep weight off wounds and reposition every 2 hours.                          Avoid standing for long periods of time.                          YNFSX wraps/stockings in AM and remove at bedtime.                          If swelling is present, elevate legs to the level of the heart or above for 30 minutes 4-5 times a day and/or when sitting.                                             When taking antibiotics take entire prescription as ordered by physician do not stop taking until medicine is all gone.                                                         Orders for this week: 6/10/22        FAX TO [facility]     Right Lateral thigh - APPLY GENTAMICIN and Loyda (if available) TO WOUND BED. PACK WITH SALINE DAMP KERLEX AND COVER WITH SORBEX OR ABD AND HYPERFIX TAPE. Wrap with coban. Change daily.         Wound vac discontinued as of 6/10/22, may send back.     Applied for grafts on 2022      Rx: [facility] [address]:  Central [phone]     Follow up with Lorri Sheldon CNP in 1 week in the wound care center. Call [phone] for any questions or concerns.   Date__________   Time___________        Treatment Note      Written Patient Dismissal Instructions Given            Electronically signed by LISBET Valdovinos CNP on 6/10/2022 at 8:22 AM

## 2022-06-22 ENCOUNTER — HOSPITAL ENCOUNTER (OUTPATIENT)
Dept: WOUND CARE | Age: 37
Discharge: HOME | End: 2022-06-22
Payer: MEDICARE

## 2022-06-22 VITALS
DIASTOLIC BLOOD PRESSURE: 63 MMHG | TEMPERATURE: 98.7 F | HEART RATE: 79 BPM | RESPIRATION RATE: 16 BRPM | SYSTOLIC BLOOD PRESSURE: 120 MMHG

## 2022-06-22 DIAGNOSIS — T81.89XA NON-HEALING SURGICAL WOUND, INITIAL ENCOUNTER: Primary | ICD-10-CM

## 2022-06-22 DIAGNOSIS — S71.001A COMPLICATED OPEN WOUND OF RIGHT HIP, INITIAL ENCOUNTER: ICD-10-CM

## 2022-06-22 PROCEDURE — 6370000000 HC RX 637 (ALT 250 FOR IP): Performed by: NURSE PRACTITIONER

## 2022-06-22 PROCEDURE — 11042 DBRDMT SUBQ TIS 1ST 20SQCM/<: CPT

## 2022-06-22 PROCEDURE — 11042 DBRDMT SUBQ TIS 1ST 20SQCM/<: CPT | Performed by: NURSE PRACTITIONER

## 2022-06-22 RX ORDER — GINSENG 100 MG
CAPSULE ORAL ONCE
Status: CANCELLED | OUTPATIENT
Start: 2022-06-22 | End: 2022-06-22

## 2022-06-22 RX ORDER — GENTAMICIN SULFATE 1 MG/G
OINTMENT TOPICAL
Qty: 30 G | Refills: 3 | Status: SHIPPED | OUTPATIENT
Start: 2022-06-22 | End: 2022-06-29

## 2022-06-22 RX ORDER — LIDOCAINE 50 MG/G
OINTMENT TOPICAL ONCE
Status: CANCELLED | OUTPATIENT
Start: 2022-06-22 | End: 2022-06-22

## 2022-06-22 RX ORDER — GENTAMICIN SULFATE 1 MG/G
OINTMENT TOPICAL ONCE
Status: CANCELLED | OUTPATIENT
Start: 2022-06-22 | End: 2022-06-22

## 2022-06-22 RX ORDER — CLOBETASOL PROPIONATE 0.5 MG/G
OINTMENT TOPICAL ONCE
Status: CANCELLED | OUTPATIENT
Start: 2022-06-22 | End: 2022-06-22

## 2022-06-22 RX ORDER — LIDOCAINE HYDROCHLORIDE 40 MG/ML
SOLUTION TOPICAL ONCE
Status: CANCELLED | OUTPATIENT
Start: 2022-06-22 | End: 2022-06-22

## 2022-06-22 RX ORDER — BETAMETHASONE DIPROPIONATE 0.05 %
OINTMENT (GRAM) TOPICAL ONCE
Status: CANCELLED | OUTPATIENT
Start: 2022-06-22 | End: 2022-06-22

## 2022-06-22 RX ORDER — LIDOCAINE 40 MG/G
CREAM TOPICAL ONCE
Status: CANCELLED | OUTPATIENT
Start: 2022-06-22 | End: 2022-06-22

## 2022-06-22 RX ORDER — LIDOCAINE HYDROCHLORIDE 20 MG/ML
JELLY TOPICAL ONCE
Status: CANCELLED | OUTPATIENT
Start: 2022-06-22 | End: 2022-06-22

## 2022-06-22 RX ORDER — BACITRACIN ZINC AND POLYMYXIN B SULFATE 500; 1000 [USP'U]/G; [USP'U]/G
OINTMENT TOPICAL ONCE
Status: CANCELLED | OUTPATIENT
Start: 2022-06-22 | End: 2022-06-22

## 2022-06-22 RX ORDER — BACITRACIN, NEOMYCIN, POLYMYXIN B 400; 3.5; 5 [USP'U]/G; MG/G; [USP'U]/G
OINTMENT TOPICAL ONCE
Status: CANCELLED | OUTPATIENT
Start: 2022-06-22 | End: 2022-06-22

## 2022-06-22 RX ORDER — GENTAMICIN SULFATE 1 MG/G
OINTMENT TOPICAL ONCE
Status: COMPLETED | OUTPATIENT
Start: 2022-06-22 | End: 2022-06-22

## 2022-06-22 RX ADMIN — GENTAMICIN SULFATE: 1 OINTMENT TOPICAL at 15:16

## 2022-06-22 ASSESSMENT — PAIN DESCRIPTION - DESCRIPTORS: DESCRIPTORS: THROBBING

## 2022-06-22 ASSESSMENT — PAIN DESCRIPTION - PAIN TYPE: TYPE: ACUTE PAIN

## 2022-06-22 ASSESSMENT — PAIN SCALES - GENERAL: PAINLEVEL_OUTOF10: 3

## 2022-06-22 ASSESSMENT — PAIN DESCRIPTION - LOCATION: LOCATION: LEG

## 2022-06-22 ASSESSMENT — PAIN DESCRIPTION - ONSET: ONSET: ON-GOING

## 2022-06-22 ASSESSMENT — PAIN - FUNCTIONAL ASSESSMENT: PAIN_FUNCTIONAL_ASSESSMENT: PREVENTS OR INTERFERES SOME ACTIVE ACTIVITIES AND ADLS

## 2022-06-22 ASSESSMENT — PAIN DESCRIPTION - FREQUENCY: FREQUENCY: INTERMITTENT

## 2022-06-22 ASSESSMENT — PAIN DESCRIPTION - ORIENTATION: ORIENTATION: RIGHT

## 2022-06-22 NOTE — PROGRESS NOTES
Wound Care Center Progress Note With Procedure    Laura Cooper  AGE: 39 y.o. GENDER: male  : 1985  EPISODE DATE:  2022     Subjective:     Chief Complaint   Patient presents with    Wound Check     right hip         HISTORY of PRESENT ILLNESS      Catarino Michel is a 39 y. o. male who presents to the Wound Clinic for a visit for evaluation and treatment of Chronic non-healing surgical and traumatic  ulcer(s) of  Right upper lateral leg / right hip.  The condition is of moderate severity. The ulcer has been present for 2 weeks, but this issue started last July when he was bitten by a dog.  The underlying cause is thought to be trauma and nonhealing surgical.  The patients care to date has included JEFFERSON drain, which became infected. The patient has significant underlying medical conditions as below.      Patient missed appointment last week because he had to work. No issues but needs a refill of gent ointment.   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     L5, S1    Depression     Hyperlipidemia     Neuropathy 2020    Seizures ([facility] [address]        PAST SURGICAL HISTORY    Past Surgical History:   Procedure Laterality Date    EYE SURGERY      for amblyopia    LEG SURGERY Right 2022    RIGHT THIGH DEBRIDEMENT INCISION AND DRAINAGE AND WASHOUT AND WOUND VAC PLACEMENT performed by Oswald Maxwell DO at [address] [address] Right 2022    RIGHT LATERAL THIGH LESION BIOPSY EXCISION performed by Oswald Maxwell DO at [address] [facility] TYMPANOSTOMY TUBE PLACEMENT         FAMILY HISTORY    Family History   Problem Relation Age of Onset    Ovarian Cancer Mother     Bipolar Disorder Mother     Lung Cancer Father     Other Brother         cerebral palsy    Diabetes Maternal Grandmother     Diabetes Paternal Grandmother        SOCIAL HISTORY    Social History     Tobacco Use    Smoking status: Former Smoker    Smokeless tobacco: Never Used   Vaping Use    Vaping Use: Never used   Substance Use Topics    Alcohol use: Not Currently     Comment: occasional    Drug use: Not Currently     Types: Marijuana (Weed)     Comment: in the past- marijuana Many years ago       ALLERGIES    Allergies   Allergen Reactions    Amoxicillin Diarrhea       MEDICATIONS    Current Outpatient Medications on File Prior to Encounter   Medication Sig Dispense Refill    ARIPiprazole (ABILIFY) 5 MG tablet Take 7.5 mg by mouth daily      PARoxetine (PAXIL) 40 MG tablet Take 40 mg by mouth daily      gemfibrozil (LOPID) 600 MG tablet Take 1 tablet by mouth 2 times daily 60 tablet 2    traZODone (DESYREL) 50 MG tablet nightly PRN      methylphenidate (RITALIN) 20 MG tablet Take one and one-half a day       No current facility-administered medications on file prior to encounter. REVIEW OF SYSTEMS    Pertinent items are noted in HPI. Constitutional: Negative for systemic symptoms including fever, chills and malaise. Objective:      /63   Pulse 79   Temp 98.7 °F (37.1 °C) (Temporal)   Resp 16     PHYSICAL EXAM      General: The patient is in no acute distress. Mental status:  Patient is appropriate, is  oriented to place and plan of care.   Dermatologic exam: Visual inspection of the periwound reveals the skin to be normal in turgor and texture and dry  Wound exam: see wound description below in procedure note      Assessment:     Problem List Items Addressed This Visit        Other    WD-Nonhealing surgical wound - Primary    Relevant Medications gentamicin (GARAMYCIN) 0.1 % ointment (Start on 6/22/2022  3:15 PM)    Other Relevant Orders    Initiate Outpatient Wound Care Protocol    WD-Complicated open wound of right hip    Relevant Medications    gentamicin (GARAMYCIN) 0.1 % ointment (Start on 6/22/2022  3:15 PM)    Other Relevant Orders    Initiate Outpatient Wound Care Protocol        Procedure Note    Indications:  Based on my examination of this patient's wound(s) today, sharp excision into necrotic subcutaneous tissue is required to promote healing and evaluate the extent of previous healing. Performed by: LISBET Chopra CNP    Consent obtained: Yes    Time out taken:  Yes    Pain Control: N/A       Debridement:Excisional Debridement    Using #15 blade scalpel the wound(s) was/were sharply debrided down through and including the removal of subcutaneous tissue.         Devitalized Tissue Debrided:  fibrin, biofilm, slough and exudate    Pre Debridement Measurements:  Are located in the Wound Documentation Flow Sheet    All active wounds listed below with today's date are evaluated  Wound(s)    debrided this date include # : 1     Post  Debridement Measurements:  Negative Pressure Wound Therapy Leg Anterior;Right;Upper (Active)   Number of days: 44       Wound 05/18/22 #1 Right Proximal Lateral Thigh (Active)   Wound Image   06/22/22 1445   Wound Etiology Traumatic 06/22/22 1445   Wound Cleansed Wound cleanser 06/22/22 1445   Offloading for Diabetic Foot Ulcers Offloading not required 06/22/22 1445   Wound Length (cm) 7.5 cm 06/22/22 1445   Wound Width (cm) 1.7 cm 06/22/22 1445   Wound Depth (cm) 0.4 cm 06/22/22 1445   Wound Surface Area (cm^2) 12.75 cm^2 06/22/22 1445   Change in Wound Size % (l*w) 77.71 06/22/22 1445   Wound Volume (cm^3) 5.1 cm^3 06/22/22 1445   Wound Healing % 96 06/22/22 1445   Post-Procedure Length (cm) 7.5 cm 06/22/22 1457   Post-Procedure Width (cm) 1.7 cm 06/22/22 1457   Post-Procedure Depth (cm) 0.4 cm 06/22/22 1457 Post-Procedure Surface Area (cm^2) 12.75 cm^2 06/22/22 1457   Post-Procedure Volume (cm^3) 5.1 cm^3 06/22/22 1457   Distance Tunneling (cm) 0 cm 06/22/22 1445   Tunneling Position ___ O'Clock 0 06/22/22 1445   Undermining Starts ___ O'Clock 0 06/22/22 1445   Undermining Ends___ O'Clock 0 06/22/22 1445   Undermining Maxium Distance (cm) 0 06/22/22 1445   Wound Assessment Granulation tissue; Hyper granulation tissue 06/22/22 1445   Drainage Amount Moderate 06/22/22 1445   Drainage Description Yellow 06/22/22 1445   Odor None 06/22/22 1445   Fatoumata-wound Assessment Intact 06/22/22 1445   Margins Defined edges 06/22/22 1445   Wound Thickness Description not for Pressure Injury Full thickness 06/22/22 1445   Number of days: 35       Percent of Wound(s) Debrided: approximately 100%    Total  Area  Debrided:  12.75 sq cm     Bleeding:  Minimal    Hemostasis Achieved:  by pressure and by silver nitrate stick    Procedural Pain:  3  / 10     Post Procedural Pain:  0 / 10     Response to treatment:  Well tolerated by patient. Status of wound progress and description from last visit:   Much improved in size  Continue to monitor and follow up next friday. Continue plan of care for now and sending gent to pharmacy at this time       Plan:       Discharge Instructions       [address]     NOTE: Upon discharge from the [address], you will receive a patient experience survey.  We would be grateful if you would take the time to fill this survey out.     Wound care order history:                               Vascular studies:   Date               Imaging:   Date               Cultures:   Date               Grafts:  Date               Antibiotics:               Earlier Wound care treatments:                           Primary care physician:      Continuing wound care orders and information:              Residence:                Continue home health care with:               Wound Medications:              LZCPY cleansing:                           YS not scrub or use excessive force.                          Wash hands with soap and water before and after dressing changes.                         Prior to applying a clean dressing, cleanse wound with normal saline,                                wound cleanser, or mild soap and water.                           Ask the physician or nurse before getting the wound(s) wet in a shower              Daily Wound management:                          Keep weight off wounds and reposition every 2 hours.                          Avoid standing for long periods of time.                          HPGCE wraps/stockings in AM and remove at bedtime.                          If swelling is present, elevate legs to the level of the heart or above for 30 minutes 4-5 times a day and/or when sitting.                                             When taking antibiotics take entire prescription as ordered by physician do not stop taking until medicine is all gone.                                                          Orders for this week: 22        FAX TO [facility]     Right Lateral thigh - APPLY GENTAMICIN and Loyda (if available) TO WOUND BED. PACK WITH SALINE DAMP KERLIX AND COVER WITH SORBEX OR ABD AND HYPERFIX TAPE. Wrap with coban. Change daily.         Wound vac discontinued as of 6/10/22, may send back.     Applied for grafts on 2022      Rx: [facility] on [address]:  Central [phone]     Follow up with Jetty Apley, CNP in 1 week in the wound care center. Call [phone] for any questions or concerns.   Date__________   Time___________        Treatment Note      Written Patient Dismissal Instructions Given            Electronically signed by LISBET Jasmine CNP on 2022 at 3:06 PM

## 2022-07-01 ENCOUNTER — HOSPITAL ENCOUNTER (OUTPATIENT)
Dept: WOUND CARE | Age: 37
Discharge: HOME | End: 2022-07-01
Payer: MEDICARE

## 2022-07-01 VITALS
TEMPERATURE: 98.7 F | DIASTOLIC BLOOD PRESSURE: 65 MMHG | SYSTOLIC BLOOD PRESSURE: 119 MMHG | RESPIRATION RATE: 18 BRPM | HEART RATE: 76 BPM

## 2022-07-01 DIAGNOSIS — S71.001D COMPLICATED OPEN WOUND OF RIGHT HIP, SUBSEQUENT ENCOUNTER: Primary | ICD-10-CM

## 2022-07-01 DIAGNOSIS — T81.89XD NON-HEALING SURGICAL WOUND, SUBSEQUENT ENCOUNTER: ICD-10-CM

## 2022-07-01 DIAGNOSIS — S71.001A COMPLICATED OPEN WOUND OF RIGHT HIP, INITIAL ENCOUNTER: ICD-10-CM

## 2022-07-01 DIAGNOSIS — T81.89XA NON-HEALING SURGICAL WOUND, INITIAL ENCOUNTER: ICD-10-CM

## 2022-07-01 PROCEDURE — 6370000000 HC RX 637 (ALT 250 FOR IP): Performed by: NURSE PRACTITIONER

## 2022-07-01 PROCEDURE — 11042 DBRDMT SUBQ TIS 1ST 20SQCM/<: CPT

## 2022-07-01 PROCEDURE — 11042 DBRDMT SUBQ TIS 1ST 20SQCM/<: CPT | Performed by: NURSE PRACTITIONER

## 2022-07-01 RX ORDER — GINSENG 100 MG
CAPSULE ORAL ONCE
Status: CANCELLED | OUTPATIENT
Start: 2022-07-01 | End: 2022-07-01

## 2022-07-01 RX ORDER — LIDOCAINE HYDROCHLORIDE 40 MG/ML
SOLUTION TOPICAL ONCE
Status: CANCELLED | OUTPATIENT
Start: 2022-07-01 | End: 2022-07-01

## 2022-07-01 RX ORDER — CLOBETASOL PROPIONATE 0.5 MG/G
OINTMENT TOPICAL ONCE
Status: CANCELLED | OUTPATIENT
Start: 2022-07-01 | End: 2022-07-01

## 2022-07-01 RX ORDER — BACITRACIN, NEOMYCIN, POLYMYXIN B 400; 3.5; 5 [USP'U]/G; MG/G; [USP'U]/G
OINTMENT TOPICAL ONCE
Status: CANCELLED | OUTPATIENT
Start: 2022-07-01 | End: 2022-07-01

## 2022-07-01 RX ORDER — LIDOCAINE 50 MG/G
OINTMENT TOPICAL ONCE
Status: CANCELLED | OUTPATIENT
Start: 2022-07-01 | End: 2022-07-01

## 2022-07-01 RX ORDER — BACITRACIN ZINC AND POLYMYXIN B SULFATE 500; 1000 [USP'U]/G; [USP'U]/G
OINTMENT TOPICAL ONCE
Status: CANCELLED | OUTPATIENT
Start: 2022-07-01 | End: 2022-07-01

## 2022-07-01 RX ORDER — GENTAMICIN SULFATE 1 MG/G
OINTMENT TOPICAL ONCE
Status: COMPLETED | OUTPATIENT
Start: 2022-07-01 | End: 2022-07-01

## 2022-07-01 RX ORDER — BETAMETHASONE DIPROPIONATE 0.05 %
OINTMENT (GRAM) TOPICAL ONCE
Status: CANCELLED | OUTPATIENT
Start: 2022-07-01 | End: 2022-07-01

## 2022-07-01 RX ORDER — GENTAMICIN SULFATE 1 MG/G
OINTMENT TOPICAL ONCE
Status: CANCELLED | OUTPATIENT
Start: 2022-07-01 | End: 2022-07-01

## 2022-07-01 RX ORDER — LIDOCAINE HYDROCHLORIDE 20 MG/ML
JELLY TOPICAL ONCE
Status: CANCELLED | OUTPATIENT
Start: 2022-07-01 | End: 2022-07-01

## 2022-07-01 RX ORDER — LIDOCAINE 40 MG/G
CREAM TOPICAL ONCE
Status: CANCELLED | OUTPATIENT
Start: 2022-07-01 | End: 2022-07-01

## 2022-07-01 RX ADMIN — GENTAMICIN SULFATE: 1 OINTMENT TOPICAL at 10:24

## 2022-07-01 ASSESSMENT — PAIN SCALES - GENERAL: PAINLEVEL_OUTOF10: 0

## 2022-07-01 NOTE — PROGRESS NOTES
Wound Care Center Progress Note With Procedure    Chris Mendez  AGE: 39 y.o. GENDER: male  : 1985  EPISODE DATE:  2022     Subjective:     Chief Complaint   Patient presents with    Wound Check     right leg          HISTORY of PRESENT ILLNESS      Catarino Michel is a 39 y. o. male who presents to the Wound Clinic for a visit for evaluation and treatment of Chronic non-healing surgical and traumatic  ulcer(s) of  Right upper lateral leg / right hip.  The condition is of moderate severity. The ulcer has been present for 2 weeks, but this issue started last July when he was bitten by a dog.  The underlying cause is thought to be trauma and nonhealing surgical.  The patients care to date has included JEFFERSON drain, which became infected. The patient has significant underlying medical conditions as below.      Great improvement this week  Patient wished to discharge from home care and change dressing himself. Nearly healed. Clean and dry.  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     L5, S1    Depression     Hyperlipidemia     Neuropathy 2020    Seizures ([facility] [address]        PAST SURGICAL HISTORY    Past Surgical History:   Procedure Laterality Date    EYE SURGERY      for amblyopia    LEG SURGERY Right 2022    RIGHT THIGH DEBRIDEMENT INCISION AND DRAINAGE AND WASHOUT AND WOUND VAC PLACEMENT performed by Dwain Buitrago DO at [address] [address] Right 2022    RIGHT LATERAL THIGH LESION BIOPSY EXCISION performed by Dwain Buitrago DO [facility] [address] [facility]         FAMILY HISTORY    Family History   Problem Relation Age of Onset    Ovarian Cancer Mother     Bipolar Disorder Mother     Lung Cancer Father     Other Brother         cerebral palsy    Diabetes Maternal Grandmother     Diabetes Paternal Grandmother        SOCIAL HISTORY    Social History     Tobacco Use    Smoking status: Former Smoker    Smokeless tobacco: Never Used   Vaping Use    Vaping Use: Never used   Substance Use Topics    Alcohol use: Not Currently     Comment: occasional    Drug use: Not Currently     Types: Marijuana (Weed)     Comment: in the past- marijuana Many years ago       ALLERGIES    Allergies   Allergen Reactions    Amoxicillin Diarrhea       MEDICATIONS    Current Outpatient Medications on File Prior to Encounter   Medication Sig Dispense Refill    ARIPiprazole (ABILIFY) 5 MG tablet Take 7.5 mg by mouth daily      PARoxetine (PAXIL) 40 MG tablet Take 40 mg by mouth daily      gemfibrozil (LOPID) 600 MG tablet Take 1 tablet by mouth 2 times daily 60 tablet 2    traZODone (DESYREL) 50 MG tablet nightly PRN      methylphenidate (RITALIN) 20 MG tablet Take one and one-half a day       No current facility-administered medications on file prior to encounter. REVIEW OF SYSTEMS    Pertinent items are noted in HPI. Constitutional: Negative for systemic symptoms including fever, chills and malaise. Objective:      /65   Pulse 76   Temp 98.7 °F (37.1 °C) (Temporal)   Resp 18     PHYSICAL EXAM      General: The patient is in no acute distress. Mental status:  Patient is appropriate, is  oriented to place and plan of care.   Dermatologic exam: Visual inspection of the periwound reveals the skin to be normal in turgor and texture and dry  Wound exam: see wound description below in procedure note      Assessment:     Problem List Items Addressed This Visit        Other    WD-Nonhealing surgical wound    Relevant Orders    Initiate Outpatient Wound Care Protocol    WD-Complicated open wound of right hip - Primary    Relevant Orders    Initiate Outpatient Wound Care Protocol        Procedure Note    Indications:  Based on my examination of this patient's wound(s) today, sharp excision into necrotic subcutaneous tissue is required to promote healing and evaluate the extent of previous healing. Performed by: LISBET Young CNP    Consent obtained: Yes    Time out taken:  Yes    Pain Control: N/A       Debridement:Excisional Debridement    Using #15 blade scalpel the wound(s) was/were sharply debrided down through and including the removal of subcutaneous tissue.         Devitalized Tissue Debrided:  fibrin, biofilm and slough    Pre Debridement Measurements:  Are located in the Wound Documentation Flow Sheet    All active wounds listed below with today's date are evaluated  Wound(s)    debrided this date include # : 1     Post  Debridement Measurements:  Negative Pressure Wound Therapy Leg Anterior;Right;Upper (Active)   Number of days: 52       Wound 05/18/22 #1 Right Proximal Lateral Thigh (Active)   Wound Image   06/22/22 1445   Wound Etiology Traumatic 07/01/22 0832   Wound Cleansed Wound cleanser 07/01/22 0832   Offloading for Diabetic Foot Ulcers Offloading not required 07/01/22 0832   Wound Length (cm) 6.5 cm 07/01/22 0832   Wound Width (cm) 0.7 cm 07/01/22 0832   Wound Depth (cm) 0.1 cm 07/01/22 0832   Wound Surface Area (cm^2) 4.55 cm^2 07/01/22 0832   Change in Wound Size % (l*w) 92.05 07/01/22 0832   Wound Volume (cm^3) 0.455 cm^3 07/01/22 0832   Wound Healing % 100 07/01/22 0832   Post-Procedure Length (cm) 6.5 cm 07/01/22 0853   Post-Procedure Width (cm) 0.7 cm 07/01/22 0853   Post-Procedure Depth (cm) 0.1 cm 07/01/22 0853   Post-Procedure Surface Area (cm^2) 4.55 cm^2 07/01/22 0853   Post-Procedure Volume (cm^3) 0.455 cm^3 07/01/22 0853   Distance Tunneling (cm) 0 cm 07/01/22 0832   Tunneling Position ___ O'Clock 0 07/01/22 0429 Undermining Starts ___ O'Clock 0 07/01/22 0832   Undermining Ends___ O'Clock 0 07/01/22 0832   Undermining Maxium Distance (cm) 0 07/01/22 0832   Wound Assessment Granulation tissue; Hyper granulation tissue 07/01/22 0832   Drainage Amount Moderate 07/01/22 0832   Drainage Description Yellow 07/01/22 0832   Odor None 07/01/22 0832   Fatoumata-wound Assessment Intact 07/01/22 0832   Margins Defined edges 07/01/22 0832   Wound Thickness Description not for Pressure Injury Full thickness 07/01/22 0832   Number of days: 43       Percent of Wound(s) Debrided: approximately 100%    Total  Area  Debrided:  4.55 sq cm     Bleeding:  Minimal    Hemostasis Achieved:  by pressure    Procedural Pain:  0  / 10     Post Procedural Pain:  0 / 10     Response to treatment:  Well tolerated by patient. Status of wound progress and description from last visit:   Much improved and close to healing. Follow up 1 week and hope to discharge soon  Continue to monitor and continue plan of care for now       Plan:       Discharge Instructions       [address]     NOTE: Upon discharge from the [address], you will receive a patient experience survey. We would be grateful if you would take the time to fill this survey out.     Wound care order history:                               Vascular studies:   Date               Imaging:   Date               Cultures:   Date               Grafts:  Date               Antibiotics:               Earlier Wound care treatments:                           Primary care physician:      Continuing wound care orders and information:              Residence:                Continue home health care with:               Wound Medications:              ZOMWJ cleansing:                           PI not scrub or use excessive force.                          Wash hands with soap and water before and after dressing changes.                           Prior to applying a clean dressing, cleanse wound with normal saline,                                wound cleanser, or mild soap and water.                           Ask the physician or nurse before getting the wound(s) wet in a shower              Daily Wound management:                          Keep weight off wounds and reposition every 2 hours.                          WZREX standing for long periods of time.                          ODDGI wraps/stockings in AM and remove at bedtime.                          If swelling is present, elevate legs to the level of the heart or above for 30 minutes 4-5 times a day and/or when sitting.                                             When taking antibiotics take entire prescription as ordered by physician do not stop taking until medicine is all gone.                                                          Orders for this week: 22      Home Care to discharge     Right Lateral thigh - APPLY GENTAMICIN and Loyda (if available) TO WOUND BED. COVER WITH ABD AND HYPERFIX TAPE (or may use silicone border gauze. Change daily.         Wound vac discontinued as of 6/10/22, may send back.     Applied for grafts on 2022      Rx: [facility] on [address]:  Central [phone]     Follow up with Hannah House CNP in 1 week in the wound care center. Call [phone] for any questions or concerns.   Date__________   Time___________        Treatment Note      Written Patient Dismissal Instructions Given            Electronically signed by LISBET Holguin CNP on 2022 at 8:54 AM

## 2022-07-08 ENCOUNTER — HOSPITAL ENCOUNTER (OUTPATIENT)
Dept: WOUND CARE | Age: 37
Discharge: HOME | End: 2022-07-08
Payer: MEDICARE

## 2022-07-08 VITALS
DIASTOLIC BLOOD PRESSURE: 81 MMHG | HEART RATE: 91 BPM | SYSTOLIC BLOOD PRESSURE: 123 MMHG | TEMPERATURE: 98.4 F | RESPIRATION RATE: 18 BRPM

## 2022-07-08 DIAGNOSIS — S71.001A COMPLICATED OPEN WOUND OF RIGHT HIP, INITIAL ENCOUNTER: ICD-10-CM

## 2022-07-08 DIAGNOSIS — T81.89XD NON-HEALING SURGICAL WOUND, SUBSEQUENT ENCOUNTER: ICD-10-CM

## 2022-07-08 DIAGNOSIS — T81.89XA NON-HEALING SURGICAL WOUND, INITIAL ENCOUNTER: ICD-10-CM

## 2022-07-08 DIAGNOSIS — S71.001D COMPLICATED OPEN WOUND OF RIGHT HIP, SUBSEQUENT ENCOUNTER: Primary | ICD-10-CM

## 2022-07-08 PROCEDURE — 11042 DBRDMT SUBQ TIS 1ST 20SQCM/<: CPT

## 2022-07-08 PROCEDURE — 11042 DBRDMT SUBQ TIS 1ST 20SQCM/<: CPT | Performed by: NURSE PRACTITIONER

## 2022-07-08 RX ORDER — CLOBETASOL PROPIONATE 0.5 MG/G
OINTMENT TOPICAL ONCE
Status: CANCELLED | OUTPATIENT
Start: 2022-07-08 | End: 2022-07-08

## 2022-07-08 RX ORDER — LIDOCAINE 40 MG/G
CREAM TOPICAL ONCE
Status: CANCELLED | OUTPATIENT
Start: 2022-07-08 | End: 2022-07-08

## 2022-07-08 RX ORDER — LIDOCAINE HYDROCHLORIDE 20 MG/ML
JELLY TOPICAL ONCE
Status: CANCELLED | OUTPATIENT
Start: 2022-07-08 | End: 2022-07-08

## 2022-07-08 RX ORDER — LIDOCAINE HYDROCHLORIDE 40 MG/ML
SOLUTION TOPICAL ONCE
Status: CANCELLED | OUTPATIENT
Start: 2022-07-08 | End: 2022-07-08

## 2022-07-08 RX ORDER — BACITRACIN ZINC AND POLYMYXIN B SULFATE 500; 1000 [USP'U]/G; [USP'U]/G
OINTMENT TOPICAL ONCE
Status: CANCELLED | OUTPATIENT
Start: 2022-07-08 | End: 2022-07-08

## 2022-07-08 RX ORDER — BETAMETHASONE DIPROPIONATE 0.05 %
OINTMENT (GRAM) TOPICAL ONCE
Status: CANCELLED | OUTPATIENT
Start: 2022-07-08 | End: 2022-07-08

## 2022-07-08 RX ORDER — GENTAMICIN SULFATE 1 MG/G
OINTMENT TOPICAL ONCE
Status: CANCELLED | OUTPATIENT
Start: 2022-07-08 | End: 2022-07-08

## 2022-07-08 RX ORDER — BACITRACIN, NEOMYCIN, POLYMYXIN B 400; 3.5; 5 [USP'U]/G; MG/G; [USP'U]/G
OINTMENT TOPICAL ONCE
Status: CANCELLED | OUTPATIENT
Start: 2022-07-08 | End: 2022-07-08

## 2022-07-08 RX ORDER — GINSENG 100 MG
CAPSULE ORAL ONCE
Status: CANCELLED | OUTPATIENT
Start: 2022-07-08 | End: 2022-07-08

## 2022-07-08 RX ORDER — LIDOCAINE 50 MG/G
OINTMENT TOPICAL ONCE
Status: CANCELLED | OUTPATIENT
Start: 2022-07-08 | End: 2022-07-08

## 2022-07-08 NOTE — PROGRESS NOTES
Wound Care Center Progress Note With Procedure    Darron Paige  AGE: 39 y.o. GENDER: male  : 1985  EPISODE DATE:  2022     Subjective:     Chief Complaint   Patient presents with    Wound Check     right thigh          HISTORY of PRESENT ILLNESS      Catarino Michel is a 39 y. o. male who presents to the Wound Clinic for a visit for evaluation and treatment of Chronic non-healing surgical and traumatic  ulcer(s) of  Right upper lateral leg / right hip.  The condition is of moderate severity. The ulcer has been present for 2 weeks, but this issue started last July when he was bitten by a dog.  The underlying cause is thought to be trauma and nonhealing surgical.  The patients care to date has included JEFFERSON drain, which became infected. The patient has significant underlying medical conditions as below.      Nearly healed. Patient has been treating the wound himself after dismissing home health last week. Patient asking about using a more simple dressing, due to tape irritating periwound     Wound Pain Timing/Severity: waxing and waning  Quality of pain: squeezing, throbbing  Severity of pain:  4 / 10   Modifying Factors: none  Associated Signs/Symptoms: edema, erythema, drainage and pain    Patient educated on the 6 essential components necessary for wound healing: Circulation, Debridements, Proper Dressings and Topical Wound Products, Infection Control, Edema Control and Offloading. Patient educated on those factors that negatively effect or impact wound healing: smoking, obesity, uncontrolled diabetes, anticoagulant and immunosuppressive regimens, inadequate nutrition, untreated arterial and venous disease if applicable and measures to manage edema.            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     L5, S1    Depression     Hyperlipidemia     Neuropathy 2020    Seizures ([facility] [address]        PAST SURGICAL HISTORY    Past Surgical History:   Procedure Laterality Date    EYE SURGERY      for amblyopia    LEG SURGERY Right 5/9/2022    RIGHT THIGH DEBRIDEMENT INCISION AND DRAINAGE AND WASHOUT AND WOUND VAC PLACEMENT performed by Magi Jose DO at [address] [place] [address] Right 5/5/2022    RIGHT LATERAL THIGH LESION BIOPSY EXCISION performed by Magi Jose DO at [place] [address]      TYMPANOSTOMY TUBE PLACEMENT         FAMILY HISTORY    Family History   Problem Relation Age of Onset    Ovarian Cancer Mother     Bipolar Disorder Mother     Lung Cancer Father     Other Brother         cerebral palsy    Diabetes Maternal Grandmother     Diabetes Paternal Grandmother        SOCIAL HISTORY    Social History     Tobacco Use    Smoking status: Former Smoker    Smokeless tobacco: Never Used   Vaping Use    Vaping Use: Never used   Substance Use Topics    Alcohol use: Not Currently     Comment: occasional    Drug use: Not Currently     Types: Marijuana (Weed)     Comment: in the past- marijuana Many years ago       ALLERGIES    Allergies   Allergen Reactions    Amoxicillin Diarrhea       MEDICATIONS    Current Outpatient Medications on File Prior to Encounter   Medication Sig Dispense Refill    ARIPiprazole (ABILIFY) 5 MG tablet Take 7.5 mg by mouth daily      PARoxetine (PAXIL) 40 MG tablet Take 40 mg by mouth daily      gemfibrozil (LOPID) 600 MG tablet Take 1 tablet by mouth 2 times daily 60 tablet 2    traZODone (DESYREL) 50 MG tablet nightly PRN      methylphenidate (RITALIN) 20 MG tablet Take one and one-half a day       No current facility-administered medications on file prior to encounter. REVIEW OF SYSTEMS    Pertinent items are noted in HPI. Constitutional: Negative for systemic symptoms including fever, chills and malaise.       Objective:      /81   Pulse 91   Temp 98.4 °F (36.9 °C) (Temporal)   Resp 18     PHYSICAL EXAM      General: The patient is in no acute distress. Mental status:  Patient is appropriate, is  oriented to place and plan of care. Dermatologic exam: Visual inspection of the periwound reveals the skin to be normal in turgor and texture and dry  Wound exam: see wound description below in procedure note      Assessment:     Problem List Items Addressed This Visit        Other    WD-Nonhealing surgical wound    Relevant Orders    Initiate Outpatient Wound Care Protocol    WD-Complicated open wound of right hip - Primary    Relevant Orders    Initiate Outpatient Wound Care Protocol        Procedure Note    Indications:  Based on my examination of this patient's wound(s) today, sharp excision into necrotic subcutaneous tissue is required to promote healing and evaluate the extent of previous healing. Performed by: LISBET Conn CNP    Consent obtained: Yes    Time out taken:  Yes    Pain Control: N/A       Debridement:Excisional Debridement    Using #15 blade scalpel the wound(s) was/were sharply debrided down through and including the removal of subcutaneous tissue.         Devitalized Tissue Debrided:  fibrin, biofilm and slough    Pre Debridement Measurements:  Are located in the Wound Documentation Flow Sheet    All active wounds listed below with today's date are evaluated  Wound(s)    debrided this date include # : 1     Post  Debridement Measurements:  Negative Pressure Wound Therapy Leg Anterior;Right;Upper (Active)   Number of days: 59       Wound 05/18/22 #1 Right Proximal Lateral Thigh (Active)   Wound Image   06/22/22 1445   Wound Etiology Traumatic 07/08/22 0843   Dressing Status New dressing applied 07/01/22 1024   Wound Cleansed Wound cleanser 07/08/22 0843   Offloading for Diabetic Foot Ulcers Offloading not required 07/01/22 0832   Wound Length (cm) 2.6 cm 07/08/22 0843   Wound Width (cm) 0.4 cm 07/08/22 0843   Wound Depth (cm) 0.1 cm 07/08/22 0843   Wound Surface Area (cm^2) 1.04 cm^2 07/08/22 0843   Change in Wound Size % (l*w) 98.18 07/08/22 0843   Wound Volume (cm^3) 0.104 cm^3 07/08/22 0843   Wound Healing % 100 07/08/22 0843   Post-Procedure Length (cm) 2.6 cm 07/08/22 0856   Post-Procedure Width (cm) 0.4 cm 07/08/22 0856   Post-Procedure Depth (cm) 0.1 cm 07/08/22 0856   Post-Procedure Surface Area (cm^2) 1.04 cm^2 07/08/22 0856   Post-Procedure Volume (cm^3) 0.104 cm^3 07/08/22 0856   Distance Tunneling (cm) 0 cm 07/08/22 0843   Tunneling Position ___ O'Clock 0 07/08/22 0843   Undermining Starts ___ O'Clock 0 07/08/22 0843   Undermining Ends___ O'Clock 0 07/08/22 0843   Undermining Maxium Distance (cm) 0 07/08/22 0843   Wound Assessment Granulation tissue 07/08/22 0843   Drainage Amount Moderate 07/08/22 0843   Drainage Description Yellow 07/08/22 0843   Odor None 07/08/22 0843   Fatoumata-wound Assessment Intact 07/08/22 0843   Margins Defined edges 07/08/22 0843   Wound Thickness Description not for Pressure Injury Full thickness 07/08/22 0843   Number of days: 50       Percent of Wound(s) Debrided: approximately 100%    Total  Area  Debrided:  1.04 sq cm     Bleeding:  Minimal    Hemostasis Achieved:  by pressure    Procedural Pain:  1  / 10     Post Procedural Pain:  0 / 10     Response to treatment:  Well tolerated by patient. Status of wound progress and description from last visit:   Hope to heal out next week. Clean and dry and much improved in size  Continue to monitor and continue plan of care for now. Patient may use a simple bandage this week and not need gauze and tape       Plan:       Discharge Instructions       PHYSICIAN ORDERS AND DISCHARGE INSTRUCTIONS     NOTE: [facility] you will receive a patient experience survey.  We would be grateful if you would take the time to fill this survey out.     Wound care order history:                               Vascular studies:   Date             Imaging:   Date               Cultures:   Date               Grafts:  Date               Antibiotics:               Earlier Wound care treatments:                           Primary care physician:      Continuing wound care orders and information:              Residence:                Continue home health care with:               Wound Medications:              USBCY cleansing:                           QG not scrub or use excessive force.                          Wash hands with soap and water before and after dressing changes.                         Prior to applying a clean dressing, cleanse wound with normal saline,                                wound cleanser, or mild soap and water.                           Ask the physician or nurse before getting the wound(s) wet in a shower              Daily Wound management:                          Keep weight off wounds and reposition every 2 hours.                          HTWSS standing for long periods of time.                          CWIDX wraps/stockings in AM and remove at bedtime.                          If swelling is present, elevate legs to the level of the heart or above for 30 minutes 4-5 times a day and/or when sitting.                                             When taking antibiotics take entire prescription as ordered by physician do not stop taking until medicine is all gone.                                                          Orders for this week: 22      Home Care to discharge     Right Lateral thigh - APPLY GENTAMICIN and Loyda (if available) TO WOUND BED. COVER WITH ABD AND HYPERFIX TAPE (or may use silicone border gauze. Change daily.         Wound vac discontinued as of 6/10/22, may send back.     Applied for grafts on 2022      Rx: Edu[facility] Stephens [address]:  [phone]     Follow up with Deyanira Leung CNP in 1 week in the wound care center.   Call [phone] for any questions or concerns.   Date__________   Time___________        Treatment Note      Written Patient Dismissal Instructions Given            Electronically signed by LISBET Horn CNP on 7/8/2022 at 9:01 AM

## 2022-07-14 ENCOUNTER — OFFICE VISIT (OUTPATIENT)
Dept: INTERNAL MEDICINE CLINIC | Age: 37
End: 2022-07-14
Payer: MEDICARE

## 2022-07-14 VITALS
OXYGEN SATURATION: 96 % | DIASTOLIC BLOOD PRESSURE: 80 MMHG | HEIGHT: 73 IN | BODY MASS INDEX: 29.31 KG/M2 | SYSTOLIC BLOOD PRESSURE: 124 MMHG | HEART RATE: 61 BPM | WEIGHT: 221.2 LBS

## 2022-07-14 DIAGNOSIS — Z79.899 LONG TERM USE OF DRUG: ICD-10-CM

## 2022-07-14 DIAGNOSIS — M48.061 SPINAL STENOSIS OF LUMBAR REGION, UNSPECIFIED WHETHER NEUROGENIC CLAUDICATION PRESENT: ICD-10-CM

## 2022-07-14 DIAGNOSIS — E78.1 HYPERTRIGLYCERIDEMIA: ICD-10-CM

## 2022-07-14 DIAGNOSIS — G89.29 CHRONIC BILATERAL LOW BACK PAIN WITH BILATERAL SCIATICA: ICD-10-CM

## 2022-07-14 DIAGNOSIS — M51.36 DDD (DEGENERATIVE DISC DISEASE), LUMBAR: Primary | ICD-10-CM

## 2022-07-14 DIAGNOSIS — S71.001S COMPLICATED OPEN WOUND OF RIGHT HIP, SEQUELA: ICD-10-CM

## 2022-07-14 DIAGNOSIS — M54.41 CHRONIC BILATERAL LOW BACK PAIN WITH BILATERAL SCIATICA: ICD-10-CM

## 2022-07-14 DIAGNOSIS — M54.42 CHRONIC BILATERAL LOW BACK PAIN WITH BILATERAL SCIATICA: ICD-10-CM

## 2022-07-14 PROCEDURE — G8427 DOCREV CUR MEDS BY ELIG CLIN: HCPCS | Performed by: FAMILY MEDICINE

## 2022-07-14 PROCEDURE — G8419 CALC BMI OUT NRM PARAM NOF/U: HCPCS | Performed by: FAMILY MEDICINE

## 2022-07-14 PROCEDURE — 1036F TOBACCO NON-USER: CPT | Performed by: FAMILY MEDICINE

## 2022-07-14 PROCEDURE — 99214 OFFICE O/P EST MOD 30 MIN: CPT | Performed by: FAMILY MEDICINE

## 2022-07-14 ASSESSMENT — ENCOUNTER SYMPTOMS
SHORTNESS OF BREATH: 0
ABDOMINAL PAIN: 0
COUGH: 0
BACK PAIN: 1
NAUSEA: 0

## 2022-07-14 NOTE — PROGRESS NOTES
Blair Simmons (:  1985) is a 39 y.o. male,established patient, here for evaluation of the following chief complaint(s):  Lower Back Pain and Hyperlipidemia         ASSESSMENT/PLAN:  1. DDD (degenerative disc disease), lumbar  - JOCE Butterfield MD, Orthopedic Surgery, [place]  - External Referral To Pain Clinic    2. Chronic bilateral low back pain with bilateral sciatica  - JOCE Butterfield MD, Orthopedic Surgery, [facility]  - External Referral To Pain Clinic    3. Spinal stenosis of lumbar region, unspecified whether neurogenic claudication present  - JOCE Butterfield MD, Orthopedic Surgery, [place]    4. Hypertriglyceridemia, chronic  Continue gemfibrozil  - Hepatic Function Panel; Future    5. Complicated open wound of right hip, sequela  Continue wound care    6. Long term use of drug  - Lipid Panel; Future  - CBC with Auto Differential; Future    Worse to ER  On this date 2022 I have spent 30 minutes reviewing previous notes, test results and face to face with the patient discussing the diagnosis and importance of compliance with the treatment plan as well as documenting on the day of the visit. Return in about 5 months (around 2022) for HLD.       22 CT Lumbar Spine w/o Contrast  FINDINGS:   BONES/ALIGNMENT: There is normal alignment of the spine. The vertebral body   heights are maintained. No osseous destructive lesion is seen. DEGENERATIVE CHANGES: Evaluation of degenerative changes is limited with CT   technique. With that said, the following observations are made:       T12-L1: Unremarkable. L1-L2: Unremarkable. L2-L3: Unremarkable. L3-L4: Small circumferential disc bulge. Mild disc height loss. Mild to   moderate bilateral facet hypertrophy and ligamentum flavum hypertrophy. Changes result in mild central canal stenosis. Mild bilateral foraminal   stenosis. L4-L5: Small circumferential disc bulge.   Mild bilateral facet hypertrophy   and ligamentum flavum hypertrophy. Mild central canal stenosis. Mild   bilateral foraminal stenosis. L5-S1: Moderate disc height loss along with vacuum effect. Moderate   circumferential disc bulge, greater centrally. Mild to moderate bilateral   facet hypertrophy. No central canal stenosis. No foraminal stenosis. SOFT TISSUES/RETROPERITONEUM: Paraspinal muscles are symmetric with   maintenance of intramuscular fat planes. No paraspinal edema is seen. Impression   No acute fracture or traumatic malalignment. Multilevel degenerative disc and facet disease as described above. .         8/3/21 XR Lumbar Spine  Impression   Mild bilateral degenerative arthritic change in the sacroiliac joints which   appear symmetric without erosive change. Degenerative disc disease at the lumbosacral junction.          Lab Results   Component Value Date    WBC 8.7 05/22/2022    HGB 16.2 05/22/2022    HCT 46.8 05/22/2022    MCV 90.3 05/22/2022     (H) 05/22/2022     Lab Results   Component Value Date    CHOL 197 05/04/2022     Lab Results   Component Value Date    TRIG 287 (H) 05/04/2022     Lab Results   Component Value Date    HDL 38 (L) 05/04/2022     Lab Results   Component Value Date    LDLCALC 102 (H) 05/04/2022    LDLDIRECT 60 03/11/2022       Lab Results   Component Value Date     05/22/2022    K 3.7 05/22/2022     05/22/2022    CO2 20 (L) 05/22/2022    BUN 6 05/22/2022    CREATININE 0.8 (L) 05/22/2022    GLUCOSE 97 05/22/2022    CALCIUM 8.9 05/22/2022    PROT 6.8 05/22/2022    LABALBU 3.8 05/22/2022    BILITOT 0.3 05/22/2022    ALKPHOS 96 05/22/2022    AST 31 05/22/2022    ALT 43 (H) 05/22/2022    LABGLOM >60 05/22/2022    GFRAA >60 05/22/2022     No results found for: TSHFT4, TSH  Lab Results   Component Value Date/Time    COLORU YELLOW 05/22/2022 07:38 PM    LABPH 6.5 05/22/2022 07:38 PM    NITRU NEGATIVE 05/22/2022 07:38 PM    NITRU Medications   Medication Sig Dispense Refill    ARIPiprazole (ABILIFY) 5 MG tablet Take 7.5 mg by mouth daily      PARoxetine (PAXIL) 40 MG tablet Take 40 mg by mouth daily      gemfibrozil (LOPID) 600 MG tablet Take 1 tablet by mouth 2 times daily 60 tablet 2    traZODone (DESYREL) 50 MG tablet nightly PRN      methylphenidate (RITALIN) 20 MG tablet Take one and one-half a day       No current facility-administered medications for this visit. Vitals:    07/14/22 1124   BP: 124/80   Site: Left Upper Arm   Position: Sitting   Cuff Size: Medium Adult   Pulse: 61   SpO2: 96%   Weight: 221 lb 3.2 oz (100.3 kg)   Height: 6' 1\" (1.854 m)     Objective   Physical Exam  Vitals reviewed. Constitutional:       General: He is not in acute distress. Cardiovascular:      Rate and Rhythm: Normal rate and regular rhythm. Pulmonary:      Effort: Pulmonary effort is normal. No respiratory distress. Breath sounds: Normal breath sounds. Abdominal:      Palpations: Abdomen is soft. Tenderness: There is no abdominal tenderness. Musculoskeletal:         General: Tenderness (discomfort in lower back) present. Cervical back: Neck supple. Right lower leg: No edema. Left lower leg: No edema. Neurological:      Mental Status: He is alert and oriented to person, place, and time. Sensory: No sensory deficit. Motor: No weakness. Psychiatric:         Mood and Affect: Mood normal.              An electronic signature was used to authenticate this note. --Tavo Jo DO     This dictation was generated by voice recognition computer software. Although all attempts are made to edit the dictation for accuracy, there may be errors in the transcription that are not intended.

## 2022-07-15 ENCOUNTER — HOSPITAL ENCOUNTER (OUTPATIENT)
Dept: WOUND CARE | Age: 37
Discharge: HOME | End: 2022-07-15
Payer: MEDICARE

## 2022-07-15 VITALS
RESPIRATION RATE: 18 BRPM | HEART RATE: 55 BPM | TEMPERATURE: 98 F | DIASTOLIC BLOOD PRESSURE: 81 MMHG | SYSTOLIC BLOOD PRESSURE: 138 MMHG

## 2022-07-15 DIAGNOSIS — S71.001A COMPLICATED OPEN WOUND OF RIGHT HIP, INITIAL ENCOUNTER: ICD-10-CM

## 2022-07-15 DIAGNOSIS — S71.001D COMPLICATED OPEN WOUND OF RIGHT HIP, SUBSEQUENT ENCOUNTER: Primary | ICD-10-CM

## 2022-07-15 DIAGNOSIS — T81.89XA NON-HEALING SURGICAL WOUND, INITIAL ENCOUNTER: ICD-10-CM

## 2022-07-15 DIAGNOSIS — T81.89XD NON-HEALING SURGICAL WOUND, SUBSEQUENT ENCOUNTER: ICD-10-CM

## 2022-07-15 PROCEDURE — 97597 DBRDMT OPN WND 1ST 20 CM/<: CPT

## 2022-07-15 PROCEDURE — 97597 DBRDMT OPN WND 1ST 20 CM/<: CPT | Performed by: NURSE PRACTITIONER

## 2022-07-15 RX ORDER — GENTAMICIN SULFATE 1 MG/G
OINTMENT TOPICAL ONCE
Status: CANCELLED | OUTPATIENT
Start: 2022-07-15 | End: 2022-07-15

## 2022-07-15 RX ORDER — GINSENG 100 MG
CAPSULE ORAL ONCE
Status: CANCELLED | OUTPATIENT
Start: 2022-07-15 | End: 2022-07-15

## 2022-07-15 RX ORDER — LIDOCAINE 50 MG/G
OINTMENT TOPICAL ONCE
Status: CANCELLED | OUTPATIENT
Start: 2022-07-15 | End: 2022-07-15

## 2022-07-15 RX ORDER — LIDOCAINE HYDROCHLORIDE 20 MG/ML
JELLY TOPICAL ONCE
Status: CANCELLED | OUTPATIENT
Start: 2022-07-15 | End: 2022-07-15

## 2022-07-15 RX ORDER — BACITRACIN ZINC AND POLYMYXIN B SULFATE 500; 1000 [USP'U]/G; [USP'U]/G
OINTMENT TOPICAL ONCE
Status: CANCELLED | OUTPATIENT
Start: 2022-07-15 | End: 2022-07-15

## 2022-07-15 RX ORDER — LIDOCAINE 40 MG/G
CREAM TOPICAL ONCE
Status: CANCELLED | OUTPATIENT
Start: 2022-07-15 | End: 2022-07-15

## 2022-07-15 RX ORDER — CLOBETASOL PROPIONATE 0.5 MG/G
OINTMENT TOPICAL ONCE
Status: CANCELLED | OUTPATIENT
Start: 2022-07-15 | End: 2022-07-15

## 2022-07-15 RX ORDER — LIDOCAINE HYDROCHLORIDE 40 MG/ML
SOLUTION TOPICAL ONCE
Status: CANCELLED | OUTPATIENT
Start: 2022-07-15 | End: 2022-07-15

## 2022-07-15 RX ORDER — BETAMETHASONE DIPROPIONATE 0.05 %
OINTMENT (GRAM) TOPICAL ONCE
Status: CANCELLED | OUTPATIENT
Start: 2022-07-15 | End: 2022-07-15

## 2022-07-15 RX ORDER — BACITRACIN, NEOMYCIN, POLYMYXIN B 400; 3.5; 5 [USP'U]/G; MG/G; [USP'U]/G
OINTMENT TOPICAL ONCE
Status: CANCELLED | OUTPATIENT
Start: 2022-07-15 | End: 2022-07-15

## 2022-07-15 ASSESSMENT — PAIN SCALES - GENERAL: PAINLEVEL_OUTOF10: 0

## 2022-07-15 NOTE — PROGRESS NOTES
Wound Care Center Progress Note With Procedure    Marco Antonio Nevarez  AGE: 39 y.o. GENDER: male  : 1985  EPISODE DATE:  7/15/2022     Subjective:     Chief Complaint   Patient presents with    Wound Check     Right hip          HISTORY of PRESENT ILLNESS      Marco Antonio Nevarez is a 39 y.o. male who presents to the [address] [place] [address] for a visit for evaluation and treatment of Chronic non-healing surgical and traumatic  ulcer(s) of  Right upper lateral leg / right hip. The condition is of moderate severity. The ulcer has been present for 2 weeks, but this issue started last July when he was bitten by a dog. The underlying cause is thought to be trauma and nonhealing surgical.  The patients care to date has included JEFFERSON drain, which became infected. The patient has significant underlying medical conditions as below. Healed today     Wound Pain Timing/Severity: waxing and waning  Quality of pain: squeezing, throbbing  Severity of pain:  4 / 10  Modifying Factors: none  Associated Signs/Symptoms: edema, erythema, drainage and pain     Patient educated on the 6 essential components necessary for wound healing: Circulation, Debridements, Proper Dressings and Topical Wound Products, Infection Control, Edema Control and Offloading. Patient educated on those factors that negatively effect or impact wound healing: smoking, obesity, uncontrolled diabetes, anticoagulant and immunosuppressive regimens, inadequate nutrition, untreated arterial and venous disease if applicable and measures to manage edema.          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, S1    Depression     Hyperlipidemia     Neuropathy 2020    Seizures ([facility] [address]        PAST SURGICAL HISTORY    Past Surgical History:   Procedure Laterality Date    EYE SURGERY      for amblyopia    LEG SURGERY Right 5/9/2022    RIGHT THIGH DEBRIDEMENT INCISION AND DRAINAGE AND WASHOUT AND WOUND VAC PLACEMENT performed by Vivi Quach DO at [address] Right 5/5/2022    RIGHT LATERAL THIGH LESION BIOPSY EXCISION performed by Vivi Quach DO at [address] [facility]      TYMPANOSTOMY TUBE PLACEMENT         FAMILY HISTORY    Family History   Problem Relation Age of Onset    Ovarian Cancer Mother     Bipolar Disorder Mother     Lung Cancer Father     Other Brother         cerebral palsy    Diabetes Maternal Grandmother     Diabetes Paternal Grandmother        SOCIAL HISTORY    Social History     Tobacco Use    Smoking status: Former    Smokeless tobacco: Never   Vaping Use    Vaping Use: Never used   Substance Use Topics    Alcohol use: Not Currently     Comment: occasional    Drug use: Not Currently     Types: Marijuana (Weed)     Comment: in the past- marijuana Many years ago       ALLERGIES    Allergies   Allergen Reactions    Amoxicillin Diarrhea       MEDICATIONS    Current Outpatient Medications on File Prior to Encounter   Medication Sig Dispense Refill    ARIPiprazole (ABILIFY) 5 MG tablet Take 7.5 mg by mouth daily      PARoxetine (PAXIL) 40 MG tablet Take 40 mg by mouth daily      gemfibrozil (LOPID) 600 MG tablet Take 1 tablet by mouth 2 times daily 60 tablet 2    traZODone (DESYREL) 50 MG tablet nightly PRN      methylphenidate (RITALIN) 20 MG tablet Take one and one-half a day       No current facility-administered medications on file prior to encounter. REVIEW OF SYSTEMS    Pertinent items are noted in HPI. Constitutional: Negative for systemic symptoms including fever, chills and malaise. Objective:      /81   Pulse 55   Temp 98 °F (36.7 °C) (Temporal)   Resp 18     PHYSICAL EXAM      General: The patient is in no acute distress. Mental status:  Patient is appropriate, is  oriented to place and plan of care.   Dermatologic exam: Visual inspection of the periwound reveals the skin to be normal in turgor and texture and dry  Wound exam: see wound description below in procedure note      Assessment:     Problem List Items Addressed This Visit          Other    WD-Nonhealing surgical wound    Relevant Orders    Initiate Outpatient Wound Care Protocol    WD-Complicated open wound of right hip - Primary    Relevant Orders    Initiate Outpatient Wound Care Protocol     Procedure Note    Indications:  Based on my examination of this patient's wound(s) today, sharp excision into necrotic  devitalized tissue  is required to promote healing and evaluate the extent of previous healing. Performed by: LISBET Betancourt CNP    Consent obtained: Yes    Time out taken:  Yes    Pain Control: N/A      Debridement:Excisional Debridement    Using #15 blade scalpel the wound(s) was/were sharply debrided down through and including the removal of devitalized tissue.         Devitalized Tissue Debrided:  exudate    Pre Debridement Measurements:  Are located in the Wound Documentation Flow Sheet    All active wounds listed below with today's date are evaluated  Wound(s)    debrided this date include # : 1     Post  Debridement Measurements:  Negative Pressure Wound Therapy Leg Anterior;Right;Upper (Active)   Number of days: 66       Wound 05/18/22 #1 Right Proximal Lateral Thigh (Active)   Wound Image   06/22/22 1445   Wound Etiology Traumatic 07/15/22 0904   Dressing Status New dressing applied 07/08/22 0934   Wound Cleansed Wound cleanser 07/15/22 0904   Offloading for Diabetic Foot Ulcers Offloading not required 07/15/22 0904   Wound Length (cm) 1.5 cm 07/15/22 0904   Wound Width (cm) 0.5 cm 07/15/22 0904   Wound Depth (cm) 0.1 cm 07/15/22 0904   Wound Surface Area (cm^2) 0.75 cm^2 07/15/22 0904   Change in Wound Size % (l*w) 98.69 07/15/22 0904   Wound Volume (cm^3) 0.075 cm^3 07/15/22 0904   Wound Healing % 100 07/15/22 0904   Post-Procedure Length (cm) 2.6 cm 07/08/22 0856 Post-Procedure Width (cm) 0.4 cm 07/08/22 0856   Post-Procedure Depth (cm) 0.1 cm 07/08/22 0856   Post-Procedure Surface Area (cm^2) 1.04 cm^2 07/08/22 0856   Post-Procedure Volume (cm^3) 0.104 cm^3 07/08/22 0856   Distance Tunneling (cm) 0 cm 07/15/22 0904   Tunneling Position ___ O'Clock 0 07/15/22 0904   Undermining Starts ___ O'Clock 0 07/15/22 0904   Undermining Ends___ O'Clock 0 07/15/22 0904   Undermining Maxium Distance (cm) 0 07/15/22 0904   Wound Assessment Eschar dry 07/15/22 0904   Drainage Amount None 07/15/22 0904   Drainage Description Yellow 07/08/22 0843   Odor None 07/15/22 0904   Fatoumata-wound Assessment Intact 07/15/22 0904   Margins Defined edges; Attached edges 07/15/22 0904   Wound Thickness Description not for Pressure Injury Full thickness 07/08/22 0843   Number of days: 57       Percent of Wound(s) Debrided: approximately 100%    Total  Area  Debrided:  0.75 sq cm     Bleeding:  None    Hemostasis Achieved:  not needed    Procedural Pain:  0  / 10     Post Procedural Pain:  0 / 10     Response to treatment:  Well tolerated by patient. Status of wound progress and description from last visit:   Healed. Discharge at this time. Patient knows that they may return or call with any questions, comments, or concerns. Discussed strategies for preventing future wounds, including regular compression, daily moisturizing, regular exercise, and performing regular foot checks.   Patient verbalized understanding        Plan:           Treatment Note      Written Patient Dismissal Instructions Given            Electronically signed by LISBET Paris CNP on 7/15/2022 at 9:22 AM

## 2022-07-15 NOTE — DISCHARGE INSTRUCTIONS
PHYSICIAN ORDERS AND DISCHARGE INSTRUCTIONS     NOTE: Upon discharge from the [address], you will receive a patient experience survey. We would be grateful if you would take the time to fill this survey out. Wound care order history:                               Vascular studies:   Date              Imaging:   Date              Cultures:   Date              Grafts:  Date              Antibiotics:              Earlier Wound care treatments:                          Primary care physician:     Continuing wound care orders and information:              Residence:                Continue home health care with:              Wound Medications:              Wound cleansing:                          Do not scrub or use excessive force. Wash hands with soap and water before and after dressing changes. Prior to applying a clean dressing, cleanse wound with normal saline,                                wound cleanser, or mild soap and water. Ask the physician or nurse before getting the wound(s) wet in a shower              Daily Wound management:                          Keep weight off wounds and reposition every 2 hours. Avoid standing for long periods of time. Apply wraps/stockings in AM and remove at bedtime. If swelling is present, elevate legs to the level of the heart or above for 30 minutes 4-5 times a day and/or when sitting. When taking antibiotics take entire prescription as ordered by physician do not stop taking until medicine is all gone. Orders for this week: 7/15/22      Home Care to discharge     Right Lateral thigh - Healed 7/15/22. May leave open to air. Wound vac discontinued as of 6/10/22, may send back.           Rx: [facility] [address]  Consult:  Central [phone]      Discharged from the wound care center 7/15/22. Call [phone] for any questions or concerns.   Date__________   Time___________

## 2022-07-18 ENCOUNTER — TELEPHONE (OUTPATIENT)
Dept: INTERNAL MEDICINE CLINIC | Age: 37
End: 2022-07-18

## 2022-07-21 ENCOUNTER — APPOINTMENT (OUTPATIENT)
Dept: GENERAL RADIOLOGY | Age: 37
End: 2022-07-21
Payer: MEDICARE

## 2022-07-21 ENCOUNTER — HOSPITAL ENCOUNTER (EMERGENCY)
Age: 37
Discharge: HOME | End: 2022-07-21
Payer: MEDICARE

## 2022-07-21 VITALS
HEART RATE: 59 BPM | DIASTOLIC BLOOD PRESSURE: 74 MMHG | RESPIRATION RATE: 14 BRPM | OXYGEN SATURATION: 96 % | WEIGHT: 221 LBS | TEMPERATURE: 98 F | BODY MASS INDEX: 29.29 KG/M2 | SYSTOLIC BLOOD PRESSURE: 112 MMHG | HEIGHT: 73 IN

## 2022-07-21 DIAGNOSIS — R07.9 CHEST PAIN, UNSPECIFIED TYPE: Primary | ICD-10-CM

## 2022-07-21 DIAGNOSIS — F14.90 COCAINE USE: ICD-10-CM

## 2022-07-21 DIAGNOSIS — T50.901A DRUG OVERDOSE, ACCIDENTAL OR UNINTENTIONAL, INITIAL ENCOUNTER: ICD-10-CM

## 2022-07-21 DIAGNOSIS — E87.6 HYPOKALEMIA: ICD-10-CM

## 2022-07-21 LAB
ACETAMINOPHEN LEVEL: <5 UG/ML (ref 15–30)
ALBUMIN SERPL-MCNC: 4.6 GM/DL (ref 3.4–5)
ALCOHOL SCREEN SERUM: <0.01 %WT/VOL
ALP BLD-CCNC: 97 IU/L (ref 40–128)
ALT SERPL-CCNC: 35 U/L (ref 10–40)
AMPHETAMINES: NEGATIVE
ANION GAP SERPL CALCULATED.3IONS-SCNC: 14 MMOL/L (ref 4–16)
AST SERPL-CCNC: 24 IU/L (ref 15–37)
BARBITURATE SCREEN URINE: NEGATIVE
BASOPHILS ABSOLUTE: 0.1 K/CU MM
BASOPHILS RELATIVE PERCENT: 0.7 % (ref 0–1)
BENZODIAZEPINE SCREEN, URINE: NEGATIVE
BILIRUB SERPL-MCNC: 1.4 MG/DL (ref 0–1)
BUN BLDV-MCNC: 17 MG/DL (ref 6–23)
CALCIUM SERPL-MCNC: 8.8 MG/DL (ref 8.3–10.6)
CANNABINOID SCREEN URINE: ABNORMAL
CHLORIDE BLD-SCNC: 102 MMOL/L (ref 99–110)
CO2: 21 MMOL/L (ref 21–32)
COCAINE METABOLITE: ABNORMAL
CREAT SERPL-MCNC: 1.2 MG/DL (ref 0.9–1.3)
DIFFERENTIAL TYPE: ABNORMAL
DOSE AMOUNT: ABNORMAL
DOSE AMOUNT: ABNORMAL
DOSE TIME: ABNORMAL
DOSE TIME: ABNORMAL
EKG ATRIAL RATE: 97 BPM
EKG DIAGNOSIS: NORMAL
EKG P-R INTERVAL: 132 MS
EKG Q-T INTERVAL: 388 MS
EKG QRS DURATION: 94 MS
EKG QTC CALCULATION (BAZETT): 492 MS
EKG R AXIS: -1 DEGREES
EKG T AXIS: 143 DEGREES
EKG VENTRICULAR RATE: 97 BPM
EOSINOPHILS ABSOLUTE: 0.1 K/CU MM
EOSINOPHILS RELATIVE PERCENT: 1.6 % (ref 0–3)
GFR AFRICAN AMERICAN: >60 ML/MIN/1.73M2
GFR NON-AFRICAN AMERICAN: >60 ML/MIN/1.73M2
GLUCOSE BLD-MCNC: 135 MG/DL (ref 70–99)
HCT VFR BLD CALC: 44.8 % (ref 42–52)
HEMOGLOBIN: 15.8 GM/DL (ref 13.5–18)
IMMATURE NEUTROPHIL %: 0.2 % (ref 0–0.43)
LYMPHOCYTES ABSOLUTE: 2.1 K/CU MM
LYMPHOCYTES RELATIVE PERCENT: 24.4 % (ref 24–44)
MAGNESIUM: 2.2 MG/DL (ref 1.8–2.4)
MCH RBC QN AUTO: 30.7 PG (ref 27–31)
MCHC RBC AUTO-ENTMCNC: 35.3 % (ref 32–36)
MCV RBC AUTO: 87.2 FL (ref 78–100)
MONOCYTES ABSOLUTE: 1 K/CU MM
MONOCYTES RELATIVE PERCENT: 11 % (ref 0–4)
NUCLEATED RBC %: 0 %
OPIATES, URINE: NEGATIVE
OXYCODONE: NEGATIVE
PDW BLD-RTO: 11.9 % (ref 11.7–14.9)
PHENCYCLIDINE, URINE: NEGATIVE
PLATELET # BLD: 305 K/CU MM (ref 140–440)
PMV BLD AUTO: 9.2 FL (ref 7.5–11.1)
POTASSIUM SERPL-SCNC: 3 MMOL/L (ref 3.5–5.1)
PRO-BNP: 115.4 PG/ML
RBC # BLD: 5.14 M/CU MM (ref 4.6–6.2)
REASON FOR REJECTION: NORMAL
REJECTED TEST: NORMAL
SALICYLATE LEVEL: 1.3 MG/DL (ref 15–30)
SEGMENTED NEUTROPHILS ABSOLUTE COUNT: 5.3 K/CU MM
SEGMENTED NEUTROPHILS RELATIVE PERCENT: 62.1 % (ref 36–66)
SODIUM BLD-SCNC: 137 MMOL/L (ref 135–145)
TOTAL CK: 179 IU/L (ref 38–174)
TOTAL IMMATURE NEUTOROPHIL: 0.02 K/CU MM
TOTAL NUCLEATED RBC: 0 K/CU MM
TOTAL PROTEIN: 7 GM/DL (ref 6.4–8.2)
TROPONIN T: <0.01 NG/ML
TROPONIN T: <0.01 NG/ML
WBC # BLD: 8.6 K/CU MM (ref 4–10.5)

## 2022-07-21 PROCEDURE — 84484 ASSAY OF TROPONIN QUANT: CPT

## 2022-07-21 PROCEDURE — 93005 ELECTROCARDIOGRAM TRACING: CPT | Performed by: PHYSICIAN ASSISTANT

## 2022-07-21 PROCEDURE — 85025 COMPLETE CBC W/AUTO DIFF WBC: CPT

## 2022-07-21 PROCEDURE — 82550 ASSAY OF CK (CPK): CPT

## 2022-07-21 PROCEDURE — 99285 EMERGENCY DEPT VISIT HI MDM: CPT

## 2022-07-21 PROCEDURE — G0480 DRUG TEST DEF 1-7 CLASSES: HCPCS

## 2022-07-21 PROCEDURE — 80307 DRUG TEST PRSMV CHEM ANLYZR: CPT

## 2022-07-21 PROCEDURE — 83735 ASSAY OF MAGNESIUM: CPT

## 2022-07-21 PROCEDURE — 80053 COMPREHEN METABOLIC PANEL: CPT

## 2022-07-21 PROCEDURE — 93010 ELECTROCARDIOGRAM REPORT: CPT | Performed by: INTERNAL MEDICINE

## 2022-07-21 PROCEDURE — 83880 ASSAY OF NATRIURETIC PEPTIDE: CPT

## 2022-07-21 PROCEDURE — 6370000000 HC RX 637 (ALT 250 FOR IP): Performed by: PHYSICIAN ASSISTANT

## 2022-07-21 PROCEDURE — 71045 X-RAY EXAM CHEST 1 VIEW: CPT

## 2022-07-21 RX ORDER — POTASSIUM BICARBONATE 25 MEQ/1
50 TABLET, EFFERVESCENT ORAL ONCE
Status: DISCONTINUED | OUTPATIENT
Start: 2022-07-21 | End: 2022-07-21 | Stop reason: RX

## 2022-07-21 RX ADMIN — POTASSIUM BICARBONATE 10 MEQ: 391 TABLET, EFFERVESCENT ORAL at 20:17

## 2022-07-21 RX ADMIN — POTASSIUM BICARBONATE 40 MEQ: 782 TABLET, EFFERVESCENT ORAL at 20:17

## 2022-07-21 ASSESSMENT — HEART SCORE: ECG: 0

## 2022-07-21 NOTE — ED TRIAGE NOTES
Pt arrives via EMS from home for chest pain post cocaine/ritalin use. States that he is prescribed ritalin and \"snorted 25 pills\" Tuesday and snorted 2 grams of cocaine yesterday. Denies SI/HI, but states that he is \"going through a rough time at home with a divorce\". History of bipolar/depression.

## 2022-07-21 NOTE — ED PROVIDER NOTES
12 lead EKG per my interpretation:  Sinus Bradycardia 56  Axis is   Normal  QTc is   484  There is no specific T wave changes appreciated. There is no specific ST wave changes appreciated.     Prior EKG to compare with was not available         [facility]  07/21/22 [id number]

## 2022-07-21 NOTE — ED NOTES
Chief Complaint:    Chest pain      Provisional Diagnosis:   Bi-polar   Depression      Risk, Psychosocial and Contextual Factors: (homeless, lack of social support etc.): Patient is going through a divorce. Current  Treatment:  Patient iss current with [facility] with Fifi Dyer CNP       Present Suicidal Behavior: DENIES    Verbal:    Attempt:      Access to Weapons:DENIES      C-SSRS Current Suicide Risk: Low, Moderate or High:      NO RISK    Past Suicidal Behavior:    Verbal: Patient states he had SI in 2019 which is where he started getting regular [place] [address] services. Attempts:  Denies    Self-Injurious/Self-Mutilation: Denies      Traumatic Event Within Past 2 Weeks: Patient states he is going through a divorce. Current Abuse:  DENIES      Legal: DENIES      Violence: DENIES      Protective Factors:  Girlfriend is supportive      Housing: Lives with girlfriend in a home        Clinical Summary:  Patient presents to ED with chest pain post cocaine/Ritallin use. Patient stated he is going through a divorce and it has been rough. Patient denies SI/HI. Patient continues to deny SI/HI during assessment. Patient denies AVH. Patient states he does not sleep well, but appetite is good. Patient admits to using cocaine and ritalin at times. Patient states he only drinks alcohol on rare occassions . Patient denies access to weapons. Patient states he was in [facility] for an ipt. Stay for SI in 2019. He states he has had regular follow up since then and feels safe returning to home . Patient states he has an appointment on 8/30 @ [facility] with Fifi Dyer CNP. Patient is agreeable to calling to get an appointment earlier. Level of Care Disposition:      Consulted with medical provider. Patient is medically stabilized. Consulted with patients RN about abnormalities or medical concerns. No abnormalities or medical concerns noted.   Consulted with__Korinne Lusterio, PA-C___________Patient to be._discharged to home with follow up [facility] services.           Radha Zayas [facility]  07/21/22 1003

## 2022-07-21 NOTE — ED PROVIDER NOTES
EMERGENCY DEPARTMENT ENCOUNTER    [facility] EMERGENCY DEPARTMENT        TRIAGE CHIEF COMPLAINT:   Chest Pain (Chest pain, states that he \"snorted 25 ritalin on Tuesday\" and used \"2 grams of cocaine yesterday\" and is now having chest pain/) and Mental Health Problem      [place]:  Murphy Katz is a 39 y.o. male that presents for chest pain. Context is patient states that \"I been having a really hard time and stressed out. \"  States that over the course of Tuesday into Wednesday, he snorted 25 tablets of Ritalin 20 mg which is his prescribed medication for ADHD. States he snorted this medication without the intent to kill or harm himself. Then beginning last evening into today, he has snorted approximately 2 g of cocaine. At 10 AM this morning, he was trying to go to bed but began having anterior chest pressure, 8/10, nonradiating with associated shortness of breath. No dizziness lightheadedness diaphoresis nausea or vomiting. No underlying history for cardiac disease. Did call EMS who gave him aspirin and chest pain has now resolved. Patient continues to deny any active suicidal homicidal ideation. No hallucinations. Does have a history of anxiety, ADHD, depression and bipolar disorder. Is on Paxil and Abilify but denies any misuse of these medications. Denies any other illicit drug use or alcohol use. No family history for early cardiac disease or sudden cardiac death.   Patient is a former smoker, no history of COPD.    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 [address]      Past Surgical History:   Procedure Laterality Date    EYE SURGERY      for amblyopia    LEG SURGERY Right 5/9/2022    RIGHT THIGH DEBRIDEMENT INCISION AND DRAINAGE AND WASHOUT AND WOUND VAC PLACEMENT performed by Evon Camarena DO at [address] Right 5/5/2022    RIGHT LATERAL THIGH LESION BIOPSY EXCISION performed by Evon Camarena DO at [address]       Family History   Problem Relation Age of Onset    Ovarian Cancer Mother     Bipolar Disorder Mother     Lung Cancer Father     Other Brother         cerebral palsy    Diabetes Maternal Grandmother     Diabetes Paternal Grandmother      Social History     Socioeconomic History    Marital status: Legally      Spouse name: Not on file    Number of children: 3    Years of education: Not on file    Highest education level: Not on file   Occupational History    Occupation: Landscaping   Tobacco Use    Smoking status: Former    Smokeless tobacco: Never   Vaping Use    Vaping Use: Never used   Substance and Sexual Activity    Alcohol use: Not Currently     Comment: occasional    Drug use: Yes     Types: Marijuana (Vergil Minerva), Cocaine     Comment: in the past- marijuana Many years ago    Sexual activity: Yes     Partners: Female   Other Topics Concern    Not on file   Social History Narrative    Not on file     Social Determinants of Health     Financial Resource Strain: Low Risk     Difficulty of Paying Living Expenses: Not hard at all   Food Insecurity: No Food Insecurity    Worried About Running Out of Food in the Last Year: Never true    Ran Out of Food in the Last Year: Never true   Transportation Needs: Not on file Physical Activity: Not on file   Stress: Not on file   Social Connections: Not on file   Intimate Partner Violence: Not on file   Housing Stability: Not on file     No current facility-administered medications for this encounter. Current Outpatient Medications   Medication Sig Dispense Refill    ARIPiprazole (ABILIFY) 5 MG tablet Take 7.5 mg by mouth daily      PARoxetine (PAXIL) 40 MG tablet Take 40 mg by mouth daily      gemfibrozil (LOPID) 600 MG tablet Take 1 tablet by mouth 2 times daily 60 tablet 2    traZODone (DESYREL) 50 MG tablet nightly PRN      methylphenidate (RITALIN) 20 MG tablet Take one and one-half a day       Allergies   Allergen Reactions    Amoxicillin Diarrhea       Nursing Notes Reviewed  PHYSICAL EXAM    VITAL SIGNS: BP (!) 131/90   Pulse 75   Temp 98 °F (36.7 °C)   Resp 18   Ht 6' 1\" (1.854 m)   Wt 221 lb (100.2 kg)   SpO2 96%   BMI 29.16 kg/m²    Constitutional:  Well developed, Well nourished, nontoxic, sitting comfortably  Head:  Normocephalic, Atraumatic  Eyes: PERRL. EOMI. Sclera clear. Conjunctiva normal, No discharge. Neck/Lymphatics: Supple, no JVD, No lymphadenopathy  Cardiovascular:  RRR, Normal S1 & S2     Peripheral Vascular: Distal pulses 2+, Capillary refill <2seconds  Respiratory:  Respirations nonnlabored, Clear to auscultation bilaterally, No retractions  Abdomen: Bowel sounds normal in all quadrants, Soft, Non tender/Nondistended, No palpable abdominal masses. Musculoskeletal: BUE/BLE symmetrical without atrophy or deformities  Integument:  Warm, Dry, Intact, Skin turgor and texture normal  Neurologic:  Alert & oriented x3 , No focal deficits noted. Cranial nerves II through XII grossly intact. No slurred speech. No facial droop. Psychiatric:  Affect appropriate. Calm and cooperative throughout exam but flattened affect, poor eye contact. Adamant denying any SI or HI. Not respond to internal stimuli. No hallucinations.       I have reviewed and interpreted all of the currently available lab results from this visit (if applicable):  Results for orders placed or performed during the hospital encounter of 07/21/22   CBC with Auto Differential   Result Value Ref Range    WBC 8.6 4.0 - 10.5 K/CU MM    RBC 5.14 4.6 - 6.2 M/CU MM    Hemoglobin 15.8 13.5 - 18.0 GM/DL    Hematocrit 44.8 42 - 52 %    MCV 87.2 78 - 100 FL    MCH 30.7 27 - 31 PG    MCHC 35.3 32.0 - 36.0 %    RDW 11.9 11.7 - 14.9 %    Platelets 365 936 - 861 K/CU MM    MPV 9.2 7.5 - 11.1 FL    Differential Type AUTOMATED DIFFERENTIAL     Segs Relative 62.1 36 - 66 %    Lymphocytes % 24.4 24 - 44 %    Monocytes % 11.0 (H) 0 - 4 %    Eosinophils % 1.6 0 - 3 %    Basophils % 0.7 0 - 1 %    Segs Absolute 5.3 K/CU MM    Lymphocytes Absolute 2.1 K/CU MM    Monocytes Absolute 1.0 K/CU MM    Eosinophils Absolute 0.1 K/CU MM    Basophils Absolute 0.1 K/CU MM    Nucleated RBC % 0.0 %    Total Nucleated RBC 0.0 K/CU MM    Total Immature Neutrophil 0.02 K/CU MM    Immature Neutrophil % 0.2 0 - 0.43 %   Brain Natriuretic Peptide   Result Value Ref Range    Pro-.4 <300 PG/ML   SPECIMEN REJECTION   Result Value Ref Range    Rejected Test CMPR     Reason for Rejection HEMOLYZED    EKG 12 Lead   Result Value Ref Range    Ventricular Rate 97 BPM    Atrial Rate 97 BPM    P-R Interval 132 ms    QRS Duration 94 ms    Q-T Interval 388 ms    QTc Calculation (Bazett) 492 ms    R Axis -1 degrees    T Axis 143 degrees    Diagnosis       Normal sinus rhythm  Low voltage QRS  Septal infarct , age undetermined  Possible Lateral infarct , age undetermined  Abnormal ECG  When compared with ECG of 22-MAY-2022 15:53,  Nonspecific T wave abnormality, worse in Inferior leads          Radiographs (if obtained):  [] The following radiograph was interpreted by myself in the absence of a radiologist:   [] Radiologist's Report Reviewed:  XR CHEST PORTABLE   Final Result   Radiographically nonacute portable chest.                   XR CHEST PORTABLE (Final result)  Result time 07/21/22 12:46:29  Final result by Elis Shin DO (07/21/22 12:46:29)                Impression:    Radiographically nonacute portable chest.             Narrative:    EXAMINATION:   ONE XRAY VIEW OF THE CHEST     7/21/2022 12:15 pm     COMPARISON:   05/02/2021 at 1150 hours     HISTORY:   ORDERING SYSTEM PROVIDED HISTORY: chest pain   TECHNOLOGIST PROVIDED HISTORY:   Reason for exam:->chest pain   Reason for Exam: chest pain     FINDINGS:   No acute pulmonary consolidations or infiltrates. No detectable pleural   effusion, pneumothorax, pulmonary edema, cardiomegaly or mediastinal widening. EKG Interpretation  Please see ED physician's note - Dr. Anu Kern - for EKG interpretation      Chart review shows recent radiographs:  No results found. ED COURSE & MEDICAL DECISION MAKING       Vital signs and nursing notes reviewed during ED course. I have independently evaluated this patient . Supervising physician - Dr. Anu Kern - was present in ED and available for consultation throughout entirety of patient's care. All pertinent Lab data and radiographic results reviewed with patient at bedside. The patient and/or the family were informed of the results of any tests/labs/imaging, the treatment plan, and time was allotted to answer questions. Clinical Impression:  1. Chest pain, unspecified type    2. Cocaine use    3. Drug overdose, accidental or unintentional, initial encounter        Patient presents with chest pain and shortness of breath after admitted cocaine and  Ritalin use. On exam, patient resting comfortably afebrile nontoxic, no acute respiratory distress. Not tachycardic hypoxic or tachypneic. He is calm and cooperative but does have a flattened depressed affect.   Adamantly denying any SI or HI and does state that he did not snort his medications or use cocaine with the intent to self harm himself but does admit to [place] a lot going on and being stressed out. \"  Did order telemetry and continuous pulse ox monitoring. Did order cardiac enzymes however given patient's misuse of home medications, we will plan to consult with poison control. Patient currently chest pain-free after aspirin on route. Given age, history and risk factors, heart score is 2 to suspect that patient's chest pain is secondary to his cocaine use. CBC with normal white count hemoglobin. BNP within normal range. Chest x-ray shows no evidence of acute cardiopulmonary process and EKG is negative for evidence of acute ischemic changes. 1220: I did speak with [facility] -recommends treating this as drug overdose and adding on Tylenol aspirin and EtOH panel within additional magnesium and CK. Would recommend utilization of benzo should patient become agitated or tachycardic. Did add on psychiatric labs and sitter was placed at bedside for safety however patient is denying any SI or HI so did not fill out pink slip. Patient was evaluated by mental crisis worker, Cinthia Lam while in the emergency department. Does feel that after negative cardiac work-up, patient would be appropriate for outpatient follow-up for his depression and mental health through his counselor at [facility] mental health services. Patient continues to adamantly deny any SI or HI throughout ED encounter. At time of this dictation, patient is requiring redraw of laboratory studies including initial troponin as lab had initially hemolyzed. 7/21/2022 4:15 PM EDT I have signed out [facility] care to [facility] . We discussed the history, physical exam, completed/pending test results (if obtained) and current treatment plan. Please refer to his/her chart for the patients further details, remaining Emergency Department course, final disposition and diagnosis.         Disposition referral (if applicable):  No follow-up provider specified.     Disposition medications (if applicable):  New Prescriptions    No medications on file         (Please note that portions of this note may have been completed with a voice recognition program. Efforts were made to edit the dictations but occasionally words are mis-transcribed.)         Judieth Lesch, PA-C  07/21/22 [id number]

## 2022-07-21 NOTE — ED PROVIDER NOTES
ED attending EKG interpretation (I otherwise did not participate in the care of this patient)    EKG Interpretation  Interpreted by me  Compared to 5/22/22  Rhythm: normal sinus   Rate: normal 97  Axis: normal  Ectopy: none  Conduction: normal  ST Segments: no acute change  T Waves: no acute change  Clinical Impression: normal sinus rhythm, no acute change     Hannah Ro MD  07/21/22 [id number]

## 2022-07-22 LAB
EKG ATRIAL RATE: 56 BPM
EKG DIAGNOSIS: NORMAL
EKG P AXIS: 36 DEGREES
EKG P-R INTERVAL: 138 MS
EKG Q-T INTERVAL: 502 MS
EKG QRS DURATION: 108 MS
EKG QTC CALCULATION (BAZETT): 484 MS
EKG R AXIS: 46 DEGREES
EKG T AXIS: 33 DEGREES
EKG VENTRICULAR RATE: 56 BPM

## 2022-07-22 PROCEDURE — 93010 ELECTROCARDIOGRAM REPORT: CPT | Performed by: INTERNAL MEDICINE

## 2022-07-22 NOTE — ED NOTES
Security called for personal belongings     Andra Tripp, [facility], [facility]  07/21/22 2141

## 2022-07-22 NOTE — ED PROVIDER NOTES
[address] ENCOUNTER        Pt Name: Vikash Donato  MRN: [id number]  [place] 1985  Date of evaluation: 7/21/2022  Provider: Karly Syed PA-C  PCP: Marie Dean DO  Note Started: 10:10 PM EDT       [facility]. I have evaluated this patient. My supervising physician was available for consultation. Palak Mc      CHIEF COMPLAINT       Chief Complaint   Patient presents with    Chest Pain     Chest pain, states that he \"snorted 25 ritalin on Tuesday\" and used \"2 grams of cocaine yesterday\" and is now having chest pain      Mental Health Problem       HISTORY OF PRESENT ILLNESS   (Location, Timing/Onset, Context/Setting, Quality, Duration, Modifying Factors, Severity, Associated Signs and Symptoms)  Note limiting factors. Chief Complaint: Chest pain    Vikash Donato is a 39 y.o. male who presents with complaint chest pain following heavy drug use consisting of both Ritalin and cocaine. Initial provider was Lalo. This case was transferred to me to watch over resulting lab studies and a repeat troponin. Nursing Notes were all reviewed and agreed with or any disagreements were addressed in the HPI. REVIEW OF SYSTEMS    (2-9 systems for level 4, 10 or more for level 5)     Review of Systems    Positives and Pertinent negatives as per HPI. Except as noted above in the ROS, all other systems were reviewed and negative.        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     L5, S1    Depression     Hyperlipidemia     Neuropathy 2020    Seizures ([address]          SURGICAL HISTORY     Past Surgical History:   Procedure Laterality Date    EYE SURGERY      for amblyopia    LEG SURGERY Right 5/9/2022    RIGHT THIGH DEBRIDEMENT INCISION AND DRAINAGE AND WASHOUT AND WOUND VAC PLACEMENT performed by Pascale Kee DO at [address] Right 5/5/2022    RIGHT LATERAL THIGH LESION BIOPSY EXCISION performed by Pascale Kee DO at [address]       Previous Medications    ARIPIPRAZOLE (ABILIFY) 5 MG TABLET    Take 7.5 mg by mouth daily    GEMFIBROZIL (LOPID) 600 MG TABLET    Take 1 tablet by mouth 2 times daily    METHYLPHENIDATE (RITALIN) 20 MG TABLET    Take one and one-half a day    PAROXETINE (PAXIL) 40 MG TABLET    Take 40 mg by mouth daily    TRAZODONE (DESYREL) 50 MG TABLET    nightly PRN         ALLERGIES     Amoxicillin    FAMILYHISTORY       Family History   Problem Relation Age of Onset    Ovarian Cancer Mother     Bipolar Disorder Mother     Lung Cancer Father     Other Brother         cerebral palsy    Diabetes Maternal Grandmother     Diabetes Paternal Grandmother           SOCIAL HISTORY       Social History     Tobacco Use    Smoking status: Former    Smokeless tobacco: Never   Vaping Use    Vaping Use: Never used   Substance Use Topics    Alcohol use: Not Currently     Comment: occasional    Drug use: Yes     Types: Marijuana (Jerronit Orris), Cocaine     Comment: in the past- marijuana Many years ago       SCREENINGS    [place] Coma Scale  Eye Opening: Spontaneous  Best Verbal Response: Oriented  Best Motor Response: Obeys commands  Jelani Coma Scale Score: 15 Heart Score for chest pain patients  History:  Moderately Suspicious  ECG: Normal  Patient Age: < 45 years  *Risk factors for Atherosclerotic disease: Cocaine abuse, Hypercholesterolemia  Risk Factors: 1 or 2 risk factors  Troponin: < 1X normal limit  Heart Score Total: 2      PHYSICAL EXAM    (up to 7 for level 4, 8 or more for level 5)     ED Triage Vitals [07/21/22 1147]   BP Temp Temp src Heart Rate Resp SpO2 Height Weight   126/75 98 °F (36.7 °C) -- 99 20 98 % 6' 1\" (1.854 m) 221 lb (100.2 kg)       Physical Exam  Vitals and nursing note reviewed. Constitutional:       Appearance: He is well-developed. HENT:      Head: Normocephalic and atraumatic. Right Ear: External ear normal.      Left Ear: External ear normal.   Eyes:      General:         Right eye: No discharge. Left eye: No discharge. Conjunctiva/sclera: Conjunctivae normal.   Cardiovascular:      Rate and Rhythm: Normal rate and regular rhythm. Heart sounds: Normal heart sounds. Pulmonary:      Effort: Pulmonary effort is normal.      Breath sounds: Normal breath sounds. Abdominal:      General: Abdomen is flat. Bowel sounds are normal.      Palpations: Abdomen is soft. Tenderness: There is no abdominal tenderness. Musculoskeletal:         General: Normal range of motion. Cervical back: Normal range of motion and neck supple. Right lower leg: No edema. Left lower leg: No edema. Skin:     General: Skin is warm and dry. Neurological:      General: No focal deficit present. Mental Status: He is alert and oriented to person, place, and time. Mental status is at baseline. Psychiatric:         Mood and Affect: Mood normal.         Behavior: Behavior normal.         Thought Content:  Thought content normal.         Judgment: Judgment normal.       DIAGNOSTIC RESULTS   LABS:    Labs Reviewed   CBC WITH AUTO DIFFERENTIAL - Abnormal; Notable for the following components:       Result Value    Monocytes % 11.0 (*)     All other components within normal limits   URINE DRUG SCREEN - Abnormal; Notable for the following components:    Cannabinoid Scrn, Ur UNCONFIRMED POSITIVE (*)     Cocaine Metabolite UNCONFIRMED POSITIVE (*)     All other components within normal limits   COMPREHENSIVE METABOLIC PANEL - Abnormal; Notable for the following components:    Potassium 3.0 (*)     Glucose 135 (*)     Total Bilirubin 1.4 (*)     All other components within normal limits   CK - Abnormal; Notable for the following components: Total  (*)     All other components within normal limits   ACETAMINOPHEN LEVEL - Abnormal; Notable for the following components:    Acetaminophen Level <5.0 (*)     All other components within normal limits   SALICYLATE LEVEL - Abnormal; Notable for the following components:    Salicylate Lvl 1.3 (*)     All other components within normal limits   BRAIN NATRIURETIC PEPTIDE   SPECIMEN REJECTION   MAGNESIUM   TROPONIN   ETHANOL   TROPONIN       When ordered only abnormal lab results are displayed. All other labs were within normal range or not returned as of this dictation. EKG: When ordered, EKG's are interpreted by the Emergency Department Physician in the absence of a cardiologist.  Please see their note for interpretation of EKG. RADIOLOGY:   Non-plain film images such as CT, Ultrasound and MRI are read by the radiologist. Plain radiographic images are visualized and preliminarily interpreted by the ED Provider with the below findings:        Interpretation per the Radiologist below, if available at the time of this note:    XR CHEST PORTABLE   Final Result   Radiographically nonacute portable chest.           XR CHEST PORTABLE    Result Date: 7/21/2022  EXAMINATION: ONE XRAY VIEW OF THE CHEST 7/21/2022 12:15 pm COMPARISON: 05/02/2021 at 1150 hours HISTORY: ORDERING SYSTEM PROVIDED HISTORY: chest pain TECHNOLOGIST PROVIDED HISTORY: Reason for exam:->chest pain Reason for Exam: chest pain FINDINGS: No acute pulmonary consolidations or infiltrates. No detectable pleural effusion, pneumothorax, pulmonary edema, cardiomegaly or mediastinal widening.      Radiographically nonacute portable chest.           PROCEDURES   Unless otherwise noted below, none     Procedures    CRITICAL CARE TIME       CONSULTS:  IP CONSULT TO PSYCHOLOGY      EMERGENCY DEPARTMENT COURSE and DIFFERENTIAL DIAGNOSIS/MDM:   Vitals:    Vitals:    07/21/22 1147 07/21/22 1550   BP: 126/75 (!) 131/90   Pulse: 99 75   Resp: 20 18   Temp: 98 °F (36.7 °C)    SpO2: 98% 96%   Weight: 221 lb (100.2 kg)    Height: 6' 1\" (1.854 m)        Patient was given the following medications:  Medications   potassium bicarb-citric acid (EFFER-K) effervescent tablet 40 mEq (40 mEq Oral Given 7/21/22 2017)     And   potassium bicarb-citric acid (EFFER-K) effervescent tablet 10 mEq (10 mEq Oral Given 7/21/22 2017)         Is this patient to be included in the SEP-1 Core Measure due to severe sepsis or septic shock? No   Exclusion criteria - the patient is NOT to be included for SEP-1 Core Measure due to: Infection is not suspected    Patient was transferred to me at the end of previous [facility] shift. The diagnosis has been established. I did add hypokalemia with potassium resulting at 3.0. Effer-K 50 mill equivalents was given. The patient's initial troponin resulted as less than 0.01 a repeat troponin 3-4 hours later was less than 0.01. The patient is safe to be discharged with mental health counseling and follow with PCP. No prescriptions at discharge. The patient did express understanding of his diagnosis and the treatment plan. FINAL IMPRESSION      1. Chest pain, unspecified type    2. Cocaine use    3. Drug overdose, accidental or unintentional, initial encounter    4.  Hypokalemia          DISPOSITION/PLAN   DISPOSITION Decision To Discharge 07/21/2022 10:06:47 PM      PATIENT REFERRED TO:  [address]  [place] [phone]    Schedule an appointment as soon as possible for a visit in 1 week      [address] [address]  [phone]  Schedule an appointment as soon as possible for a visit in 1 week      [facility] Emergency Department  [phone]  Go to   If symptoms worsen      DISCHARGE MEDICATIONS:  New Prescriptions    No medications on file       DISCONTINUED MEDICATIONS:  Discontinued Medications    No medications on file              (Please note that portions of this note were completed with a voice recognition program.  Efforts were made to edit the dictations but occasionally words are mis-transcribed. )    Malorie Gonzalez PA-C (electronically signed)           Malorie Gonzalez PA-C  07/21/22 8332

## 2022-07-22 NOTE — DISCHARGE INSTRUCTIONS
Your potassium was low at 3.0. We gave you replacement calcium. Maintain good potassium rich diet going home. I did would recommend that you avoid strong stimulant drugs such as amphetamines, cocaine and caffeine. Because of the cocaine we did have to check blood test known as troponin. We then waited for 3-4-hour repeat troponin and this was not elevated. Therefore, heart injury is not present at this time. I would like you to follow-up with [facility] mental health regarding your drug use.

## 2022-08-09 NOTE — PROGRESS NOTES
Kanchan Branham  1985  27 y.o.  male    Chief Complaint   Patient presents with   [place]on Sicard Establish Care    Lower Back Pain         History of Present Illness  Kanchan Branham is a 27 y.o. male who presents today for a new patient visit. Patient Active Problem List   Diagnosis    Chronic low back pain    Bipolar 2 disorder [address]    Anxiety    ADHD    Depression   -He is managed by Deya Sibley for his mental health  -He c/o of chronic back pain for 2 years. He can feel some pain into the L leg and upper thigh. He can have muscle spasm in back. No GI or  changes. No weakness or saddle anesthesia. He has to lift at work in [address] [employer] . He does not have a car and has to walk long distances. Review of Systems   Constitutional: Negative for diaphoresis and fever. HENT: Negative. Eyes: Negative for pain and visual disturbance. Respiratory: Negative for cough and shortness of breath. Cardiovascular: Negative for chest pain and palpitations. Gastrointestinal: Negative for abdominal pain, blood in stool, constipation, diarrhea and nausea. Genitourinary: Negative for difficulty urinating. Musculoskeletal: Positive for back pain. Negative for neck pain. Neurological: Positive for numbness (LLE). Negative for dizziness, weakness and headaches. Psychiatric/Behavioral: Negative for dysphoric mood.        Allergies   Allergen Reactions    Amoxicillin Diarrhea       Past Medical History:   Diagnosis Date    ADHD     Anxiety     Bipolar 2 disorder (HCC)     Chronic low back pain     Depression     Seizures (HCC)        Past Surgical History:   Procedure Laterality Date    EYE SURGERY      for amblyopia    TONSILLECTOMY AND ADENOIDECTOMY      TYMPANOSTOMY TUBE PLACEMENT         Family History   Problem Relation Age of Onset    Ovarian Cancer Mother     Bipolar Disorder Mother     Lung Cancer Father     Other Brother         cerebral palsy    Diabetes Maternal Grandmother     Diabetes Paternal Grandmother        Social History     Tobacco Use    Smoking status: Former Smoker    Smokeless tobacco: Never Used   Substance Use Topics    Alcohol use: Yes     Comment: occasional    Drug use: Not Currently     Types: Marijuana       Current Outpatient Medications   Medication Sig Dispense Refill    traZODone (DESYREL) 50 MG tablet       PARoxetine HCl (PAXIL PO) Take by mouth      methylphenidate (RITALIN) 20 MG tablet Take one and one-half a day      methocarbamol (ROBAXIN-750) 750 MG tablet Take 1 tablet by mouth 2 times daily as needed (muscle spasm) 40 tablet 0    ARIPiprazole (ABILIFY PO) Take by mouth       No current facility-administered medications for this visit. OBJECTIVE:    /78   Pulse 65   Temp 97.3 °F (36.3 °C)   Ht 6' 1\" (1.854 m)   Wt 218 lb (98.9 kg)   SpO2 99%   BMI 28.76 kg/m²     Physical Exam  Vitals signs reviewed. Constitutional:       General: He is not in acute distress. Appearance: He is well-developed. HENT:      Head: Normocephalic and atraumatic. Ears:      Comments: B/L wax     Mouth/Throat:      Mouth: Mucous membranes are moist.      Comments: Dentures  Eyes:      Conjunctiva/sclera: Conjunctivae normal.      Pupils: Pupils are equal, round, and reactive to light. Comments: L eye amblyopia   Neck:      Musculoskeletal: Neck supple. Cardiovascular:      Rate and Rhythm: Normal rate and regular rhythm. Pulmonary:      Effort: Pulmonary effort is normal. No respiratory distress. Breath sounds: Normal breath sounds. Abdominal:      General: Bowel sounds are normal.      Palpations: Abdomen is soft. Tenderness: There is no abdominal tenderness. Musculoskeletal:         General: Tenderness (lumbar) present. Right lower leg: No edema. Left lower leg: No edema. Neurological:      Mental Status: He is alert and oriented to person, place, and time. Cranial Nerves: No cranial nerve deficit. Sensory: Sensory deficit (LLE-decreased sensation ) present. Motor: No weakness. Gait: Gait normal.   Psychiatric:         Mood and Affect: Mood normal.         ASSESSMENT:  1. Chronic bilateral low back pain with left-sided sciatica    2. Muscle spasm        PLAN:    Orders Placed This Encounter   Procedures    XR LUMBAR SPINE (MIN 4 VIEWS)    Urinalysis with Microscopic   Antoenttemax Quick MD, Physical Medicine, [facility]    [facility] - Celso Rodriguez MD, Pain Management, [place]       Orders Placed This Encounter   Medications    methocarbamol (ROBAXIN-750) 750 MG tablet     Sig: Take 1 tablet by mouth 2 times daily as needed (muscle spasm)     Dispense:  40 tablet     Refill:  0   Obtain old records  Obtain Ua today  Per orders  Start Robaxin  ADR's explained  Limit heavy lifting  Persist RTO or call           Return for Follow up pending.     Electronically Signed by Jennifer Massey DO 15 feet, distance limited by environmental barriers in ED

## 2022-08-30 ENCOUNTER — TELEPHONE (OUTPATIENT)
Dept: INTERNAL MEDICINE CLINIC | Age: 37
End: 2022-08-30

## 2022-08-30 NOTE — TELEPHONE ENCOUNTER
Patient called stating he is currently seeing pain management specialist in [address]. He just moved to [facility] and would like a referral placed for a pain management specialist in [facility].

## 2022-08-31 NOTE — TELEPHONE ENCOUNTER
Called and notified patient to find a pain doctor in his area. Told him we would send the referral once he found one and called us with the information. Patient verbally acknowledged.

## 2022-09-21 DIAGNOSIS — E78.1 HYPERTRIGLYCERIDEMIA: ICD-10-CM

## 2022-09-21 RX ORDER — GEMFIBROZIL 600 MG/1
600 TABLET, FILM COATED ORAL 2 TIMES DAILY
Qty: 60 TABLET | Refills: 2 | Status: SHIPPED | OUTPATIENT
Start: 2022-09-21

## 2022-10-22 NOTE — CARE COORDINATION
CM received consult for patient in waiting room. CM met with patient to begin discharge planning. CM introduced self and CM role. Patient states he typically utilizes insurance rides for transportation. Patient reports he was brought to the ER via EMS. Patient reports he does not have transportation home and would like [place] to be contacted. CM placed telephone call to [place] to obtain transportation. CM was advised ETA for transportation could be between 20 minutes to 3 hours. Trip # [id number]. CM updated patient on ETA. [address] [employer] placed telephone call to [employer] for transportation. ETA 10 minutes. Patient to be transported to [address] 5[address] CM received return call from [place] requesting additional information. Transportation not secured through [place] at this point. CM advised [place] that patient had been provided transportation via [employer]. pt having exasperation of asthma pt with audible upper air way wheeze slight cp and c ough

## 2022-11-30 ENCOUNTER — OFFICE VISIT (OUTPATIENT)
Dept: INTERNAL MEDICINE CLINIC | Age: 37
End: 2022-11-30
Payer: MEDICARE

## 2022-11-30 VITALS
OXYGEN SATURATION: 98 % | HEART RATE: 102 BPM | WEIGHT: 218.6 LBS | DIASTOLIC BLOOD PRESSURE: 68 MMHG | SYSTOLIC BLOOD PRESSURE: 118 MMHG | RESPIRATION RATE: 18 BRPM | BODY MASS INDEX: 28.06 KG/M2 | HEIGHT: 74 IN

## 2022-11-30 DIAGNOSIS — H61.23 BILATERAL IMPACTED CERUMEN: Primary | ICD-10-CM

## 2022-11-30 DIAGNOSIS — H66.001 NON-RECURRENT ACUTE SUPPURATIVE OTITIS MEDIA OF RIGHT EAR WITHOUT SPONTANEOUS RUPTURE OF TYMPANIC MEMBRANE: ICD-10-CM

## 2022-11-30 DIAGNOSIS — M54.42 CHRONIC BILATERAL LOW BACK PAIN WITH BILATERAL SCIATICA: ICD-10-CM

## 2022-11-30 DIAGNOSIS — M51.36 DDD (DEGENERATIVE DISC DISEASE), LUMBAR: ICD-10-CM

## 2022-11-30 DIAGNOSIS — M54.41 CHRONIC BILATERAL LOW BACK PAIN WITH BILATERAL SCIATICA: ICD-10-CM

## 2022-11-30 DIAGNOSIS — M48.061 SPINAL STENOSIS OF LUMBAR REGION, UNSPECIFIED WHETHER NEUROGENIC CLAUDICATION PRESENT: ICD-10-CM

## 2022-11-30 DIAGNOSIS — G89.29 CHRONIC BILATERAL LOW BACK PAIN WITH BILATERAL SCIATICA: ICD-10-CM

## 2022-11-30 PROCEDURE — G8419 CALC BMI OUT NRM PARAM NOF/U: HCPCS

## 2022-11-30 PROCEDURE — G8484 FLU IMMUNIZE NO ADMIN: HCPCS

## 2022-11-30 PROCEDURE — 1036F TOBACCO NON-USER: CPT

## 2022-11-30 PROCEDURE — 69209 REMOVE IMPACTED EAR WAX UNI: CPT

## 2022-11-30 PROCEDURE — G8427 DOCREV CUR MEDS BY ELIG CLIN: HCPCS

## 2022-11-30 PROCEDURE — 99213 OFFICE O/P EST LOW 20 MIN: CPT

## 2022-11-30 RX ORDER — CEFDINIR 300 MG/1
300 CAPSULE ORAL 2 TIMES DAILY
Qty: 10 CAPSULE | Refills: 0 | Status: SHIPPED | OUTPATIENT
Start: 2022-11-30 | End: 2022-12-05

## 2022-11-30 RX ORDER — HYDROCODONE BITARTRATE AND ACETAMINOPHEN 5; 325 MG/1; MG/1
TABLET ORAL
COMMUNITY
Start: 2022-10-18

## 2022-11-30 ASSESSMENT — ENCOUNTER SYMPTOMS
SORE THROAT: 0
CHOKING: 0
EYE DISCHARGE: 0
SHORTNESS OF BREATH: 0
DIARRHEA: 0
CONSTIPATION: 0
SWOLLEN GLANDS: 0
EYE REDNESS: 0
FACIAL SWELLING: 0
WHEEZING: 0
COUGH: 0
COLOR CHANGE: 0
RHINORRHEA: 0
ABDOMINAL PAIN: 0
BACK PAIN: 1
VOMITING: 0
STRIDOR: 0
NAUSEA: 0
CHEST TIGHTNESS: 0
EYE PAIN: 0
VISUAL CHANGE: 0
TROUBLE SWALLOWING: 0

## 2022-11-30 ASSESSMENT — VISUAL ACUITY: OU: 1

## 2022-11-30 NOTE — PROGRESS NOTES
Subjective:      Chief Complaint   Patient presents with    Ear Fullness     Patient states that he has had ear pain/fullness/ringing x3 weeks. Said that he feels like someone is standing on his ear. Referral - General     Patient requesting referral to pain management- Patient was dismissed from previous pain management due to transportation problems. HPI:  Letty Corona is a 39 y.o. male who presents today with bilateral otalgia and chronic back pain details below. Otalgia   There is pain in both ears. This is a new problem. The current episode started in the past 7 days. The problem occurs constantly. The problem has been waxing and waning. There has been no fever. The pain is at a severity of 5/10. The pain is moderate. Associated symptoms include ear discharge and headaches. Pertinent negatives include no abdominal pain, coughing, diarrhea, hearing loss, neck pain, rash, rhinorrhea, sore throat or vomiting. He has tried nothing for the symptoms. The treatment provided no relief. Chronic Pain  This is a chronic problem. The current episode started more than 1 year ago. The problem occurs daily. The problem has been waxing and waning since onset. The pain is present in the lower back. The pain is severe. The symptoms are aggravated by any movement. Associated symptoms include headaches and stiffness. Pertinent negatives include no abdominal pain, chest pain, constipation, diarrhea, dysuria, eye pain, fatigue, fever, joint swelling, nausea, rash, sensory change, shortness of breath, swollen glands, urinary symptoms, vaginal discharge, visual change, vomiting, weakness or wheezing. Past treatments include prescription narcotic and rest. The treatment provided significant relief. There is no swelling present. He has been Behaving normally. His past medical history is significant for chronic back pain.    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     L5, S1    Depression     Hyperlipidemia     Neuropathy 2020    Seizures ([facility] [address]       Past Surgical History:   Procedure Laterality Date    EYE SURGERY      for amblyopia    LEG SURGERY Right 5/9/2022    RIGHT THIGH DEBRIDEMENT INCISION AND DRAINAGE AND WASHOUT AND WOUND VAC PLACEMENT performed by Wilbur Marsh DO at [address] Right 5/5/2022    RIGHT LATERAL THIGH LESION BIOPSY EXCISION performed by Wilbur Marsh DO at [address]      TYMPANOSTOMY TUBE PLACEMENT       Social History     Tobacco Use    Smoking status: Former    Smokeless tobacco: Never   Substance Use Topics    Alcohol use: Not Currently     Comment: occasional      Review of Systems   Constitutional:  Negative for activity change, appetite change, chills, diaphoresis, fatigue, fever and unexpected weight change. HENT:  Positive for ear discharge and ear pain. Negative for congestion, facial swelling, hearing loss, rhinorrhea, sore throat and trouble swallowing. Eyes:  Negative for pain, discharge, redness and visual disturbance. Respiratory:  Negative for cough, choking, chest tightness, shortness of breath, wheezing and stridor. Cardiovascular:  Negative for chest pain, palpitations and leg swelling. Gastrointestinal:  Negative for abdominal pain, constipation, diarrhea, nausea and vomiting. Genitourinary:  Negative for difficulty urinating, dysuria, flank pain, hematuria and vaginal discharge. Musculoskeletal:  Positive for back pain and stiffness. Negative for gait problem, joint swelling, myalgias and neck pain. Skin:  Negative for color change, pallor and rash. Neurological:  Positive for headaches. Negative for dizziness, sensory change, syncope, weakness, light-headedness and numbness. Psychiatric/Behavioral:  Negative for agitation, behavioral problems and decreased concentration. Prior to Visit Medications    Medication Sig Taking? Authorizing Provider   HYDROcodone-acetaminophen (Bertha Calvin) 5-325 MG per tablet TAKE 1 TABLET BY MOUTH THREE TIMES DAILY AS NEEDED Yes Historical Provider, MD   gemfibrozil (LOPID) 600 MG tablet Take 1 tablet by mouth 2 times daily Yes Sixto Haywood DO   ARIPiprazole (ABILIFY) 5 MG tablet Take 7.5 mg by mouth daily Yes Historical Provider, MD   PARoxetine (PAXIL) 40 MG tablet Take 40 mg by mouth daily Yes Historical Provider, MD   traZODone (DESYREL) 50 MG tablet nightly PRN Yes Historical Provider, MD   methylphenidate (RITALIN) 20 MG tablet Take one and one-half a day Yes Historical Provider, MD        Objective:      /68 (Site: Left Upper Arm, Position: Sitting, Cuff Size: Medium Adult)   Pulse (!) 102   Resp 18   Ht 6' 2\" (1.88 m)   Wt 218 lb 9.6 oz (99.2 kg)   SpO2 98%   BMI 28.07 kg/m²    Physical Exam  Vitals reviewed. Constitutional:       General: He is awake. He is not in acute distress. Appearance: Normal appearance. He is well-developed. He is not ill-appearing or toxic-appearing. HENT:      Head: Normocephalic. Jaw: There is normal jaw occlusion. Right Ear: External ear normal. There is impacted cerumen. Left Ear: External ear normal. There is impacted cerumen. Nose: Nose normal. No congestion or rhinorrhea. Mouth/Throat:      Mouth: Mucous membranes are moist.      Pharynx: Oropharynx is clear. No oropharyngeal exudate or posterior oropharyngeal erythema. Eyes:      General: Lids are normal. Vision grossly intact. Gaze aligned appropriately. Extraocular Movements: Extraocular movements intact. Conjunctiva/sclera: Conjunctivae normal.      Pupils: Pupils are equal, round, and reactive to light. Cardiovascular:      Rate and Rhythm: Normal rate and regular rhythm. Pulses: Normal pulses.       Heart sounds: Normal heart sounds, S1 normal and S2 normal.   Pulmonary:      Effort: Pulmonary effort is normal. No respiratory distress. Breath sounds: Normal breath sounds. Abdominal:      General: Bowel sounds are normal.      Palpations: Abdomen is soft. Tenderness: There is no abdominal tenderness. There is no right CVA tenderness, left CVA tenderness, guarding or rebound. Musculoskeletal:         General: Tenderness present. Normal range of motion. Cervical back: Normal range of motion. Right lower leg: No edema. Left lower leg: No edema. Skin:     General: Skin is warm and dry. Capillary Refill: Capillary refill takes less than 2 seconds. Neurological:      General: No focal deficit present. Mental Status: He is alert and oriented to person, place, and time. Mental status is at baseline. GCS: GCS eye subscore is 4. GCS verbal subscore is 5. GCS motor subscore is 6. Cranial Nerves: No cranial nerve deficit. Sensory: Sensation is intact. No sensory deficit. Motor: Motor function is intact. Coordination: Coordination is intact. Gait: Gait is intact. Psychiatric:         Attention and Perception: Attention and perception normal.         Mood and Affect: Mood and affect normal.         Speech: Speech normal.         Behavior: Behavior normal. Behavior is cooperative. Thought Content:  Thought content normal.         Cognition and Memory: Cognition and memory normal.         Judgment: Judgment normal.        (32088) Cerumen Removal - Irrigation/Lvg    Date/Time: 11/30/2022 10:23 AM  Performed by: LISBET Lebron CNP  Authorized by: LISBET Lebron CNP     Consent:     Consent obtained:  Verbal    Consent given by:  Patient    Alternatives discussed:  No treatment, observation and delayed treatment  Universal protocol:     Patient identity confirmed:  Verbally with patient  Procedure details:     Location:  L ear and R ear    Procedure type: irrigation      Procedure outcomes: cerumen removed    Post-procedure details: Inspection:  TM intact (patient appears to have a right sided ear infection based on fingings)    Hearing quality:  Improved    Procedure completion:  Tolerated well, no immediate complications    Assessment / Plan:        1. Bilateral impacted cerumen  Removal successful, see procedure note. - (47608) Cerumen Removal - Irrigation/Lvg    2. Non-recurrent acute suppurative otitis media of right ear without spontaneous rupture of tympanic membrane  Rx placed for Cefdinir due to allergy to amoxicillin. Questions answered at time of appointment and patient agrees with plan of care. - cefdinir (OMNICEF) 300 MG capsule; Take 1 capsule by mouth 2 times daily for 5 days  Dispense: 10 capsule; Refill: 0    3. Chronic bilateral low back pain with bilateral sciatica  Referral placed today, patient to be sent to alternate pain clinic. After discussion with Dr. Miguelangel Sigala decision to hold on Rx of Norco in interim as to not interfere with care plan of pain clinic.     - External Referral To Pain Clinic    4. Spinal stenosis of lumbar region, unspecified whether neurogenic claudication present  See above. - External Referral To Pain Clinic    5. DDD (degenerative disc disease), lumbar  See above. - External Referral To Pain Clinic      I have spent 25 minutes on this patient encounter. Patient voiced understanding and agreement with plan. All questions/concerns were addressed, risks/side effects of medications were reviewed. Return precautions and after visit summary were provided. Return if symptoms worsen or fail to improve. or earlier as needed. Please note this report has been produced using speech recognition software and may contain errors related to that system including errors in grammar, punctuation, and spelling, as well as words and phrases that may be inappropriate. If there are any questions or concerns please feel free to contact the dictating provider for clarification.     Evelio Rincon, APRN - CNP

## 2022-12-02 ENCOUNTER — TELEPHONE (OUTPATIENT)
Dept: INTERNAL MEDICINE CLINIC | Age: 37
End: 2022-12-02

## 2022-12-02 NOTE — TELEPHONE ENCOUNTER
Called pt and per phone message, # is not is service. Spoke with MARCELLUS Ashraf and per PCP she is not willing to Rx pain medication. Referral to pain mgmt faxed. Phone # to pain mgmt is [phone].

## 2022-12-02 NOTE — TELEPHONE ENCOUNTER
----- Message from Ginger January sent at 12/2/2022  1:55 PM EST -----  Subject: Message to Provider    QUESTIONS  Information for Provider? PT checking to see if Dr he seen on 11/30 was   able to order his pain medications. Please review and contact PT back. Thank you!   ---------------------------------------------------------------------------  --------------  Jag LEE  [phone]; OK to leave message on voicemail  ---------------------------------------------------------------------------  --------------  SCRIPT ANSWERS  Relationship to Patient?  Self

## 2023-01-03 ENCOUNTER — TELEPHONE (OUTPATIENT)
Dept: INTERNAL MEDICINE CLINIC | Age: 38
End: 2023-01-03

## 2023-01-03 NOTE — TELEPHONE ENCOUNTER
Medical records request from [facility] received and faxed to [phone] this day for processing.

## 2023-01-26 DIAGNOSIS — E78.1 HYPERTRIGLYCERIDEMIA: ICD-10-CM

## 2023-01-26 RX ORDER — GEMFIBROZIL 600 MG/1
TABLET, FILM COATED ORAL
Qty: 60 TABLET | Refills: 0 | Status: SHIPPED | OUTPATIENT
Start: 2023-01-26

## 2023-01-26 NOTE — TELEPHONE ENCOUNTER
30 days of medication provided. He will need an appointment before any additional refills. He was to do labs(Lipid and Hepatic function) before the visit as well

## 2023-03-22 DIAGNOSIS — E78.1 HYPERTRIGLYCERIDEMIA: ICD-10-CM

## 2023-03-22 RX ORDER — GEMFIBROZIL 600 MG/1
TABLET, FILM COATED ORAL
Qty: 60 TABLET | Refills: 0 | OUTPATIENT
Start: 2023-03-22

## 2023-03-30 ENCOUNTER — TELEPHONE (OUTPATIENT)
Dept: INTERNAL MEDICINE CLINIC | Age: 38
End: 2023-03-30

## 2023-03-30 NOTE — TELEPHONE ENCOUNTER
Medical records request received from [facility] . All pertinent information was mailed to [address] [facility] [address].  Request also faxed to [facility] to follow

## 2023-04-22 DIAGNOSIS — E78.1 HYPERTRIGLYCERIDEMIA: ICD-10-CM

## 2023-04-24 RX ORDER — GEMFIBROZIL 600 MG/1
TABLET, FILM COATED ORAL
Qty: 60 TABLET | Refills: 0 | OUTPATIENT
Start: 2023-04-24

## 2023-07-13 ENCOUNTER — OFFICE VISIT (OUTPATIENT)
Dept: INTERNAL MEDICINE CLINIC | Age: 38
End: 2023-07-13

## 2023-07-13 VITALS
BODY MASS INDEX: 27.6 KG/M2 | SYSTOLIC BLOOD PRESSURE: 130 MMHG | WEIGHT: 215 LBS | DIASTOLIC BLOOD PRESSURE: 68 MMHG | OXYGEN SATURATION: 97 % | HEART RATE: 62 BPM

## 2023-07-13 DIAGNOSIS — R53.83 OTHER FATIGUE: ICD-10-CM

## 2023-07-13 DIAGNOSIS — R07.9 CHEST PAIN, UNSPECIFIED TYPE: Primary | ICD-10-CM

## 2023-07-13 DIAGNOSIS — G89.29 CHRONIC BILATERAL LOW BACK PAIN WITH BILATERAL SCIATICA: ICD-10-CM

## 2023-07-13 DIAGNOSIS — M48.061 SPINAL STENOSIS OF LUMBAR REGION, UNSPECIFIED WHETHER NEUROGENIC CLAUDICATION PRESENT: ICD-10-CM

## 2023-07-13 DIAGNOSIS — M54.41 CHRONIC BILATERAL LOW BACK PAIN WITH BILATERAL SCIATICA: ICD-10-CM

## 2023-07-13 DIAGNOSIS — E78.1 HYPERTRIGLYCERIDEMIA: ICD-10-CM

## 2023-07-13 DIAGNOSIS — M54.42 CHRONIC BILATERAL LOW BACK PAIN WITH BILATERAL SCIATICA: ICD-10-CM

## 2023-07-13 DIAGNOSIS — M51.36 DDD (DEGENERATIVE DISC DISEASE), LUMBAR: ICD-10-CM

## 2023-07-13 LAB
BASOPHILS # BLD: 0.1 K/UL (ref 0–0.2)
BASOPHILS NFR BLD: 1.1 %
DEPRECATED RDW RBC AUTO: 13 % (ref 12.4–15.4)
EOSINOPHIL # BLD: 0.2 K/UL (ref 0–0.6)
EOSINOPHIL NFR BLD: 3.9 %
HCT VFR BLD AUTO: 46.3 % (ref 40.5–52.5)
HGB BLD-MCNC: 16.4 G/DL (ref 13.5–17.5)
LYMPHOCYTES # BLD: 1.4 K/UL (ref 1–5.1)
LYMPHOCYTES NFR BLD: 31.8 %
MCH RBC QN AUTO: 32.4 PG (ref 26–34)
MCHC RBC AUTO-ENTMCNC: 35.3 G/DL (ref 31–36)
MCV RBC AUTO: 91.6 FL (ref 80–100)
MONOCYTES # BLD: 0.5 K/UL (ref 0–1.3)
MONOCYTES NFR BLD: 10.7 %
NEUTROPHILS # BLD: 2.3 K/UL (ref 1.7–7.7)
NEUTROPHILS NFR BLD: 52.5 %
PLATELET # BLD AUTO: 288 K/UL (ref 135–450)
PMV BLD AUTO: 8.8 FL (ref 5–10.5)
RBC # BLD AUTO: 5.05 M/UL (ref 4.2–5.9)
WBC # BLD AUTO: 4.4 K/UL (ref 4–11)

## 2023-07-13 PROCEDURE — 36415 COLL VENOUS BLD VENIPUNCTURE: CPT

## 2023-07-13 PROCEDURE — 99214 OFFICE O/P EST MOD 30 MIN: CPT

## 2023-07-13 PROCEDURE — 93000 ELECTROCARDIOGRAM COMPLETE: CPT

## 2023-07-13 RX ORDER — GEMFIBROZIL 600 MG/1
600 TABLET, FILM COATED ORAL 2 TIMES DAILY
Qty: 60 TABLET | Refills: 0 | Status: SHIPPED | OUTPATIENT
Start: 2023-07-13

## 2023-07-13 SDOH — ECONOMIC STABILITY: FOOD INSECURITY: WITHIN THE PAST 12 MONTHS, THE FOOD YOU BOUGHT JUST DIDN'T LAST AND YOU DIDN'T HAVE MONEY TO GET MORE.: OFTEN TRUE

## 2023-07-13 SDOH — ECONOMIC STABILITY: HOUSING INSECURITY
IN THE LAST 12 MONTHS, WAS THERE A TIME WHEN YOU DID NOT HAVE A STEADY PLACE TO SLEEP OR SLEPT IN A SHELTER (INCLUDING NOW)?: NO

## 2023-07-13 SDOH — ECONOMIC STABILITY: FOOD INSECURITY: WITHIN THE PAST 12 MONTHS, YOU WORRIED THAT YOUR FOOD WOULD RUN OUT BEFORE YOU GOT MONEY TO BUY MORE.: OFTEN TRUE

## 2023-07-13 SDOH — ECONOMIC STABILITY: INCOME INSECURITY: HOW HARD IS IT FOR YOU TO PAY FOR THE VERY BASICS LIKE FOOD, HOUSING, MEDICAL CARE, AND HEATING?: HARD

## 2023-07-13 ASSESSMENT — PATIENT HEALTH QUESTIONNAIRE - PHQ9
SUM OF ALL RESPONSES TO PHQ QUESTIONS 1-9: 15
5. POOR APPETITE OR OVEREATING: 3
8. MOVING OR SPEAKING SO SLOWLY THAT OTHER PEOPLE COULD HAVE NOTICED. OR THE OPPOSITE, BEING SO FIGETY OR RESTLESS THAT YOU HAVE BEEN MOVING AROUND A LOT MORE THAN USUAL: 1
7. TROUBLE CONCENTRATING ON THINGS, SUCH AS READING THE NEWSPAPER OR WATCHING TELEVISION: 0
SUM OF ALL RESPONSES TO PHQ QUESTIONS 1-9: 15
SUM OF ALL RESPONSES TO PHQ QUESTIONS 1-9: 15
6. FEELING BAD ABOUT YOURSELF - OR THAT YOU ARE A FAILURE OR HAVE LET YOURSELF OR YOUR FAMILY DOWN: 1
9. THOUGHTS THAT YOU WOULD BE BETTER OFF DEAD, OR OF HURTING YOURSELF: 0
SUM OF ALL RESPONSES TO PHQ QUESTIONS 1-9: 15
1. LITTLE INTEREST OR PLEASURE IN DOING THINGS: 2
3. TROUBLE FALLING OR STAYING ASLEEP: 2
10. IF YOU CHECKED OFF ANY PROBLEMS, HOW DIFFICULT HAVE THESE PROBLEMS MADE IT FOR YOU TO DO YOUR WORK, TAKE CARE OF THINGS AT HOME, OR GET ALONG WITH OTHER PEOPLE: 0
SUM OF ALL RESPONSES TO PHQ9 QUESTIONS 1 & 2: 5
2. FEELING DOWN, DEPRESSED OR HOPELESS: 3
4. FEELING TIRED OR HAVING LITTLE ENERGY: 3

## 2023-07-13 ASSESSMENT — ENCOUNTER SYMPTOMS
COLOR CHANGE: 0
EYE REDNESS: 0
ABDOMINAL PAIN: 0
CHOKING: 0
CHEST TIGHTNESS: 0
EYE DISCHARGE: 0
SORE THROAT: 0
SHORTNESS OF BREATH: 0
RHINORRHEA: 0
EYE PAIN: 0
NAUSEA: 0
COUGH: 0
CONSTIPATION: 0
STRIDOR: 0
VOMITING: 0
FACIAL SWELLING: 0
TROUBLE SWALLOWING: 0
WHEEZING: 0
DIARRHEA: 0

## 2023-07-13 ASSESSMENT — VISUAL ACUITY: OU: 1

## 2023-07-13 NOTE — PROGRESS NOTES
Subjective:      Chief Complaint   Patient presents with    Referral - General     Pain management, PT    Fatigue    Dizziness     If he is outside too long he passes out, has passed out 3 times in the last week      HPI:  Sagrario Ledezma is a 44 y.o. male who presents today to get re-established with care. Reports has been living in [place] without transportation which limited him from getting to appointments. Fatigue  Reports has felt much more fatigue than usual. Notices getting full a lot quicker, losing appetite, generalized weakness, intermittent dizziness x a while. Chest Pain  Reports started intermittently x 4 days. Has had 2 syncopal episodes while helping someone move in the last week. Denies drug use. Hypertriglyceridemia  Needs refill of gemfibrozil. Hasn't been able to take recently. Bipolar 2 disorder  Still following with psychiatry. Appointments are sporadic, last one a couple months ago. Reports social anxiety is worsening. DDD   Dropped from pain management due to not making it to appointments. Would like to get re-established.      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     L5, S1    Depression     Hyperlipidemia     Neuropathy 2020    Seizures ([address])       Past Surgical History:   Procedure Laterality Date    EYE SURGERY      for amblyopia    LEG SURGERY Right 5/9/2022    RIGHT THIGH DEBRIDEMENT INCISION AND DRAINAGE AND WASHOUT AND WOUND VAC PLACEMENT performed by Barabara Fothergill, DO at [address] Right 5/5/2022    RIGHT LATERAL THIGH LESION BIOPSY EXCISION performed by Barabara Fothergill, DO at [address]      TYMPANOSTOMY TUBE PLACEMENT       Social History     Tobacco Use    Smoking status: Former    Smokeless tobacco: Never   Substance Use Topics    Alcohol use: Not Currently     Comment:

## 2023-07-14 LAB
25(OH)D3 SERPL-MCNC: 31.2 NG/ML
ALBUMIN SERPL-MCNC: 4.8 G/DL (ref 3.4–5)
ALBUMIN/GLOB SERPL: 2.1 {RATIO} (ref 1.1–2.2)
ALP SERPL-CCNC: 91 U/L (ref 40–129)
ALT SERPL-CCNC: 45 U/L (ref 10–40)
ANION GAP SERPL CALCULATED.3IONS-SCNC: 16 MMOL/L (ref 3–16)
AST SERPL-CCNC: 26 U/L (ref 15–37)
BILIRUB SERPL-MCNC: 0.7 MG/DL (ref 0–1)
BUN SERPL-MCNC: 13 MG/DL (ref 7–20)
CALCIUM SERPL-MCNC: 9.6 MG/DL (ref 8.3–10.6)
CHLORIDE SERPL-SCNC: 105 MMOL/L (ref 99–110)
CHOLEST SERPL-MCNC: 140 MG/DL (ref 0–199)
CO2 SERPL-SCNC: 21 MMOL/L (ref 21–32)
CREAT SERPL-MCNC: 1.2 MG/DL (ref 0.9–1.3)
EST. AVERAGE GLUCOSE BLD GHB EST-MCNC: 88.2 MG/DL
FOLATE SERPL-MCNC: 10.04 NG/ML (ref 4.78–24.2)
GFR SERPLBLD CREATININE-BSD FMLA CKD-EPI: >60 ML/MIN/{1.73_M2}
GLUCOSE SERPL-MCNC: 92 MG/DL (ref 70–99)
HBA1C MFR BLD: 4.7 %
HDLC SERPL-MCNC: 34 MG/DL (ref 40–60)
LDLC SERPL CALC-MCNC: 77 MG/DL
POTASSIUM SERPL-SCNC: 3.7 MMOL/L (ref 3.5–5.1)
PROT SERPL-MCNC: 7.1 G/DL (ref 6.4–8.2)
SODIUM SERPL-SCNC: 142 MMOL/L (ref 136–145)
TRIGL SERPL-MCNC: 146 MG/DL (ref 0–150)
TSH SERPL DL<=0.005 MIU/L-ACNC: 0.75 UIU/ML (ref 0.27–4.2)
VIT B12 SERPL-MCNC: 448 PG/ML (ref 211–911)
VLDLC SERPL CALC-MCNC: 29 MG/DL

## 2023-09-20 ENCOUNTER — HOSPITAL ENCOUNTER (EMERGENCY)
Age: 38
Discharge: HOME | End: 2023-09-20
Attending: STUDENT IN AN ORGANIZED HEALTH CARE EDUCATION/TRAINING PROGRAM
Payer: MEDICAID

## 2023-09-20 VITALS
OXYGEN SATURATION: 99 % | TEMPERATURE: 98.1 F | DIASTOLIC BLOOD PRESSURE: 103 MMHG | SYSTOLIC BLOOD PRESSURE: 155 MMHG | HEART RATE: 62 BPM | RESPIRATION RATE: 18 BRPM

## 2023-09-20 DIAGNOSIS — B34.9 ACUTE VIRAL SYNDROME: Primary | ICD-10-CM

## 2023-09-20 LAB
INFLUENZA A ANTIGEN: NOT DETECTED
INFLUENZA B ANTIGEN: NOT DETECTED
SARS-COV-2 RDRP RESP QL NAA+PROBE: NOT DETECTED
SOURCE: NORMAL

## 2023-09-20 PROCEDURE — 87502 INFLUENZA DNA AMP PROBE: CPT

## 2023-09-20 PROCEDURE — 99283 EMERGENCY DEPT VISIT LOW MDM: CPT

## 2023-09-20 PROCEDURE — 87635 SARS-COV-2 COVID-19 AMP PRB: CPT

## 2023-09-20 PROCEDURE — 87081 CULTURE SCREEN ONLY: CPT

## 2023-09-20 PROCEDURE — 87430 STREP A AG IA: CPT

## 2023-09-20 ASSESSMENT — ENCOUNTER SYMPTOMS
NAUSEA: 0
ABDOMINAL PAIN: 0
SHORTNESS OF BREATH: 0
SORE THROAT: 1
COUGH: 1
VOMITING: 0

## 2023-09-20 NOTE — ED PROVIDER NOTES
[address] [facility]  Emergency Department Encounter    Pt Name:Catarino Deleon  MRN: [id number]  [address] [facility] [place] 1985  Date of evaluation: 9/20/23  PCP:  Jo Ann Moya, [address] [place] [address]       Chief Complaint   Patient presents with    Headache    Sore Throat     Kids just had strep     Congestion       HISTORY OF PRESENT ILLNESS     Jens Zheng is a 44 y.o. male who presents with nasal congestion sore throat headache, cough. Symptoms have been ongoing for the last couple days. Was told by his work that he needs to come in to get a work note. He denies any fevers or chills but does endorse feeling warm. No abdominal pain nausea, vomiting. No issues with urination. Denies any chest pain. Patient states that he has been around other people with similar symptoms. 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 has a past surgical history that includes Tonsillectomy and adenoidectomy; Tympanostomy tube placement; Eye surgery; skin biopsy (Right, 5/5/2022); and Leg Surgery (Right, 5/9/2022).       Social History     Socioeconomic History    Marital status: Legally      Spouse name: Not on file    Number of children: 3    Years of education: Not on file    Highest education level: Not on file   Occupational History    Occupation: Landscaping   Tobacco Use    Smoking status: Former    Smokeless tobacco: Never   Vaping Use    Vaping Use: Never used   Substance and Sexual Activity    Alcohol use: Not Currently     Comment: occasional    Drug use: Yes     Types: Marijuana (Caesar Lester), Cocaine     Comment: in the past- marijuana Many years ago    Sexual activity: Yes     Partners: Female   Other Topics Concern    Not on file

## 2023-09-22 LAB
CULTURE: NORMAL
Lab: NORMAL
SPECIMEN: NORMAL
STREP A DIRECT SCREEN: NEGATIVE

## 2023-10-26 ENCOUNTER — OFFICE VISIT (OUTPATIENT)
Dept: INTERNAL MEDICINE CLINIC | Age: 38
End: 2023-10-26
Payer: MEDICAID

## 2023-10-26 VITALS
BODY MASS INDEX: 25.94 KG/M2 | OXYGEN SATURATION: 96 % | SYSTOLIC BLOOD PRESSURE: 128 MMHG | HEART RATE: 76 BPM | WEIGHT: 202 LBS | DIASTOLIC BLOOD PRESSURE: 72 MMHG

## 2023-10-26 DIAGNOSIS — E78.1 HYPERTRIGLYCERIDEMIA: Primary | ICD-10-CM

## 2023-10-26 DIAGNOSIS — R07.9 CHEST PAIN, UNSPECIFIED TYPE: ICD-10-CM

## 2023-10-26 DIAGNOSIS — Z79.899 LONG TERM USE OF DRUG: ICD-10-CM

## 2023-10-26 DIAGNOSIS — R20.2 LEFT HAND PARESTHESIA: ICD-10-CM

## 2023-10-26 PROCEDURE — 99214 OFFICE O/P EST MOD 30 MIN: CPT | Performed by: FAMILY MEDICINE

## 2023-10-26 RX ORDER — TIZANIDINE 4 MG/1
4 TABLET ORAL 3 TIMES DAILY PRN
COMMUNITY
Start: 2023-09-07

## 2023-10-26 RX ORDER — GEMFIBROZIL 600 MG/1
600 TABLET, FILM COATED ORAL 2 TIMES DAILY
Qty: 60 TABLET | Refills: 5 | Status: SHIPPED | OUTPATIENT
Start: 2023-10-26

## 2023-10-26 RX ORDER — MELOXICAM 15 MG/1
15 TABLET ORAL DAILY
COMMUNITY
Start: 2023-09-07

## 2023-10-26 ASSESSMENT — ENCOUNTER SYMPTOMS
COUGH: 0
NAUSEA: 0
ABDOMINAL PAIN: 0
SHORTNESS OF BREATH: 0

## 2023-10-26 NOTE — PROGRESS NOTES
(ABILIFY) 5 MG tablet Take 1.5 tablets by mouth daily      PARoxetine (PAXIL) 40 MG tablet Take 1 tablet by mouth daily      traZODone (DESYREL) 50 MG tablet nightly PRN      methylphenidate (RITALIN) 20 MG tablet Take one and one-half a day       No current facility-administered medications for this visit. Vitals:    10/26/23 1407   BP: 128/72   Pulse: 76   SpO2: 96%   Weight: 91.6 kg (202 lb)     Objective   Physical Exam  Vitals reviewed. Constitutional:       General: He is not in acute distress. Cardiovascular:      Rate and Rhythm: Normal rate and regular rhythm. Pulmonary:      Effort: Pulmonary effort is normal. No respiratory distress. Breath sounds: Normal breath sounds. Abdominal:      Palpations: Abdomen is soft. Tenderness: There is no abdominal tenderness. Musculoskeletal:      Cervical back: Neck supple. Right lower leg: No edema. Left lower leg: No edema. Neurological:      Mental Status: He is alert and oriented to person, place, and time. Psychiatric:         Mood and Affect: Mood normal.                An electronic signature was used to authenticate this note. --Antonia Chavez DO     This dictation was generated by voice recognition computer software. Although all attempts are made to edit the dictation for accuracy, there may be errors in the transcription that are not intended.

## 2023-10-29 PROBLEM — S71.001A COMPLICATED OPEN WOUND OF RIGHT HIP: Status: RESOLVED | Noted: 2022-05-18 | Resolved: 2023-10-29

## 2023-10-30 ENCOUNTER — HOSPITAL ENCOUNTER (OUTPATIENT)
Dept: GENERAL RADIOLOGY | Age: 38
Discharge: HOME | End: 2023-10-30
Payer: MEDICAID

## 2023-10-30 ENCOUNTER — HOSPITAL ENCOUNTER (OUTPATIENT)
Age: 38
Discharge: HOME | End: 2023-10-30
Payer: MEDICAID

## 2023-10-30 DIAGNOSIS — Z79.899 LONG TERM USE OF DRUG: ICD-10-CM

## 2023-10-30 DIAGNOSIS — R07.9 CHEST PAIN, UNSPECIFIED TYPE: ICD-10-CM

## 2023-10-30 DIAGNOSIS — E78.1 HYPERTRIGLYCERIDEMIA: ICD-10-CM

## 2023-10-30 LAB
ALBUMIN SERPL-MCNC: 4.3 GM/DL (ref 3.4–5)
ALP BLD-CCNC: 84 IU/L (ref 40–129)
ALT SERPL-CCNC: 28 U/L (ref 10–40)
AST SERPL-CCNC: 17 IU/L (ref 15–37)
BILIRUB SERPL-MCNC: 0.5 MG/DL (ref 0–1)
BILIRUBIN DIRECT: 0.2 MG/DL (ref 0–0.3)
BILIRUBIN, INDIRECT: 0.3 MG/DL (ref 0–0.7)
CHOLEST SERPL-MCNC: 189 MG/DL
HDLC SERPL-MCNC: 33 MG/DL
LDLC SERPL CALC-MCNC: ABNORMAL MG/DL
LDLC SERPL-MCNC: 110 MG/DL
TOTAL PROTEIN: 6.3 GM/DL (ref 6.4–8.2)
TRIGL SERPL-MCNC: 474 MG/DL

## 2023-10-30 PROCEDURE — 71046 X-RAY EXAM CHEST 2 VIEWS: CPT

## 2023-10-30 PROCEDURE — 36415 COLL VENOUS BLD VENIPUNCTURE: CPT

## 2023-10-30 PROCEDURE — 83721 ASSAY OF BLOOD LIPOPROTEIN: CPT

## 2023-10-30 PROCEDURE — 80076 HEPATIC FUNCTION PANEL: CPT

## 2023-10-30 PROCEDURE — 80061 LIPID PANEL: CPT

## 2023-11-08 ENCOUNTER — INITIAL CONSULT (OUTPATIENT)
Dept: CARDIOLOGY CLINIC | Age: 38
End: 2023-11-08
Payer: MEDICAID

## 2023-11-08 VITALS
DIASTOLIC BLOOD PRESSURE: 64 MMHG | BODY MASS INDEX: 25.8 KG/M2 | SYSTOLIC BLOOD PRESSURE: 106 MMHG | HEIGHT: 74 IN | HEART RATE: 51 BPM | WEIGHT: 201 LBS

## 2023-11-08 DIAGNOSIS — R94.31 ABNORMAL EKG: ICD-10-CM

## 2023-11-08 DIAGNOSIS — R60.0 EDEMA OF LOWER EXTREMITY: ICD-10-CM

## 2023-11-08 DIAGNOSIS — R06.02 SHORTNESS OF BREATH: Primary | ICD-10-CM

## 2023-11-08 DIAGNOSIS — R07.9 CHEST PAIN, UNSPECIFIED TYPE: ICD-10-CM

## 2023-11-08 DIAGNOSIS — R42 DIZZINESS AND GIDDINESS: ICD-10-CM

## 2023-11-08 DIAGNOSIS — R00.1 BRADYCARDIA: ICD-10-CM

## 2023-11-08 DIAGNOSIS — E78.5 DYSLIPIDEMIA: ICD-10-CM

## 2023-11-08 PROCEDURE — 93000 ELECTROCARDIOGRAM COMPLETE: CPT | Performed by: INTERNAL MEDICINE

## 2023-11-08 PROCEDURE — 99204 OFFICE O/P NEW MOD 45 MIN: CPT | Performed by: INTERNAL MEDICINE

## 2023-11-08 NOTE — PATIENT INSTRUCTIONS
We are committed to providing you the best care possible. If you receive a survey after visiting one of our offices, please take time to share your experience concerning your physician office visit. These surveys are confidential and no health information about you is shared. We are eager to improve for you and we are counting on your feedback to help make that happen. Please be informed that if you contact our office outside of normal business hours the physician on call cannot help with any scheduling or rescheduling issues, procedure instruction questions or any type of medication issue. We advise you for any urgent/emergency that you go to the nearest emergency room! PLEASE CALL OUR OFFICE DURING NORMAL BUSINESS HOURS    Monday - Friday   8 am to 5 pm    [place]: [address] [place]: [phone]    [place]:  [phone]  Thank you for allowing us to care for you today! We want to ensure we can follow your treatment plan and we strive to give you the best outcomes and experience possible. If you ever have a life threatening emergency and call 911 - for an ambulance (EMS)   Our providers can only care for you at:   [facility] or [facility]. Even if you have someone take you or you drive yourself we can only care for you in a [facility] facility. Our providers are not setup at the other healthcare locations! **It is YOUR responsibilty to bring medication bottles and/or updated medication list to [address].  This will allow us to better serve you and all your healthcare needs**

## 2023-11-08 NOTE — PROGRESS NOTES
Appropriate prescriptions are addressed and refills ordered. Questions answered and patient verbalizes understanding. Call for any problems, questions, or concerns.

## 2023-11-22 DIAGNOSIS — G62.9 PERIPHERAL NEUROPATHY: ICD-10-CM

## 2023-11-22 DIAGNOSIS — R94.31 ABNORMAL EKG: ICD-10-CM

## 2023-11-22 DIAGNOSIS — R07.9 CHEST PAIN: Primary | ICD-10-CM

## 2023-11-27 ENCOUNTER — TELEPHONE (OUTPATIENT)
Dept: CARDIOLOGY CLINIC | Age: 38
End: 2023-11-27

## 2023-12-05 ENCOUNTER — PROCEDURE VISIT (OUTPATIENT)
Dept: PHYSICAL MEDICINE AND REHAB | Age: 38
End: 2023-12-05
Payer: COMMERCIAL

## 2023-12-05 DIAGNOSIS — M79.602 PARESTHESIA AND PAIN OF BOTH UPPER EXTREMITIES: ICD-10-CM

## 2023-12-05 DIAGNOSIS — G56.03 BILATERAL CARPAL TUNNEL SYNDROME: Primary | ICD-10-CM

## 2023-12-05 DIAGNOSIS — R20.2 PARESTHESIA AND PAIN OF BOTH UPPER EXTREMITIES: ICD-10-CM

## 2023-12-05 DIAGNOSIS — M79.601 PARESTHESIA AND PAIN OF BOTH UPPER EXTREMITIES: ICD-10-CM

## 2023-12-05 PROCEDURE — 95911 NRV CNDJ TEST 9-10 STUDIES: CPT | Performed by: PHYSICAL MEDICINE & REHABILITATION

## 2023-12-05 PROCEDURE — 95886 MUSC TEST DONE W/N TEST COMP: CPT | Performed by: PHYSICAL MEDICINE & REHABILITATION

## 2023-12-05 NOTE — PROGRESS NOTES
EMG REPORT     CHIEF COMPLAINT: Numbness, tingling and weakness in the left hand. HISTORY OF PRESENT ILLNESS: 44 y.o. left hand dominant male with rather abrupt onset of left hand (and eventually forearm) numbness, tingling and burning pain involving most fingers. He drops things from his  but did not report much trouble with sleep because of the symptoms. He has cramping in the left palm but did not report limb discoloration or any vesicular rash. He rated the pain severity as 10/10 at times. He has chronic neck pain, particularly on the right but no clear radiation of neck pain to his left hand. He has no history of diabetes or any thyroid disorder. PHYSICAL EXAMINATION: Alert. Only about 60 degrees of active cervical rotation of the right and 80 degrees to the left. Spurling's maneuver was uncomfortable but did not reproduce limb symptoms. Upper limb reflexes were trace only. Tinel sign is negative. There was no atrophy, tremor or clonus noted. Sensation was blunted over the thumbs and long fingers of each hand, more so on the left. Thumb opposition MMT was 4+/5 on the left. All other strength seemed normal.      NERVE CONDUCTION STUDIES:     MOTOR         LATENCY NORMAL AMPLITUDE DISTANCE COND. KATRINA. RIGHT  MEDIAN 4.1 < 4.2 msec 6.6 8 cm >50   LEFT  MEDIAN 5.2 < 4.2 msec 2.4 8 cm 51   RIGHT  ULNAR 3.1 < 4.2 msec 4.0 8 cm >50   LEFT  ULNAR 3.1 < 4.2 msec 4.1 8 cm 58      SENSORY  ORTHODROMIC        LATENCY NORMAL AMPLITUDE DISTANCE   RIGHT MEDIAN 2.9 <2.3 msec 15 10 cm   LEFT  MEDIAN 4.0 < 2.3 msec 14 10 cm   RIGHT  ULNAR 2.2 < 2.3 msec 8 10 cm   LEFT  ULNAR 2.5 < 2.3 msec 6 10 cm       Left dorsal ulnar sensory: dL 2.3 msec, amp 5 microV.         NEEDLE EMG:      RIGHT   LEFT     Insertional Activity Spontaneous  Activity Volutional  MUAP's Insertional Activity Spontaneous  Activity Volutional  MUAP's   Cerv Parasp    Normal None Normal   Infraspinatus    Normal None Normal   Deltoid

## 2023-12-07 ENCOUNTER — TELEPHONE (OUTPATIENT)
Dept: INTERNAL MEDICINE CLINIC | Age: 38
End: 2023-12-07

## 2023-12-07 NOTE — TELEPHONE ENCOUNTER
EMG results: The patient has mild to moderate left carpal tunnel syndrome. There is minimal CTS in the right hand.   Suggest further evaluation with orthopedics

## 2023-12-13 ENCOUNTER — OFFICE VISIT (OUTPATIENT)
Dept: INTERNAL MEDICINE CLINIC | Age: 38
End: 2023-12-13
Payer: MEDICAID

## 2023-12-13 VITALS
BODY MASS INDEX: 26.82 KG/M2 | DIASTOLIC BLOOD PRESSURE: 82 MMHG | HEIGHT: 74 IN | OXYGEN SATURATION: 96 % | HEART RATE: 96 BPM | WEIGHT: 209 LBS | SYSTOLIC BLOOD PRESSURE: 110 MMHG

## 2023-12-13 DIAGNOSIS — M79.672 LEFT FOOT PAIN: ICD-10-CM

## 2023-12-13 DIAGNOSIS — L84 CALLUS OF FOOT: ICD-10-CM

## 2023-12-13 DIAGNOSIS — G56.03 BILATERAL CARPAL TUNNEL SYNDROME: Primary | ICD-10-CM

## 2023-12-13 PROCEDURE — 99213 OFFICE O/P EST LOW 20 MIN: CPT | Performed by: FAMILY MEDICINE

## 2023-12-13 NOTE — PROGRESS NOTES
Steven Pitcher (:  1985) is a 44 y.o. male,established patient, here for evaluation of the following chief complaint(s): Foot Pain (Left foot  pain and some numbness ) and Carpal Tunnel         ASSESSMENT/PLAN:  1. Bilateral carpal tunnel syndrome  - Scarlet Maldonado MD, Orthopedic Surgery, [place]    2. Left foot pain  - JOCE Huff DPM, Podiatry, [place]  - XR FOOT LEFT (MIN 3 VIEWS); Future    3. Callus of foot  - JOCE Huff DPM, Podiatry, [place]    Use Tylenol OTC  On this date 2023 I have spent 20 minutes reviewing previous notes, test results and face to face with the patient discussing the diagnosis and importance of compliance with the treatment plan as well as documenting on the day of the visit. Return if symptoms worsen or fail to improve.        Lab Results   Component Value Date    WBC 4.4 2023    HGB 16.4 2023    HCT 46.3 2023    MCV 91.6 2023     2023         Lab Results   Component Value Date    LABA1C 4.7 2023     Lab Results   Component Value Date    EAG 88.2 2023     Lab Results   Component Value Date     2023    K 3.7 2023     2023    CO2 21 2023    BUN 13 2023    CREATININE 1.2 2023    GLUCOSE 92 2023    CALCIUM 9.6 2023    PROT 6.3 (L) 10/30/2023    LABALBU 4.3 10/30/2023    BILITOT 0.5 10/30/2023    ALKPHOS 84 10/30/2023    AST 17 10/30/2023    ALT 28 10/30/2023    LABGLOM >60 2023    GFRAA >60 2022    AGRATIO 2.1 2023     Lab Results   Component Value Date    TSH 0.75 2023       Subjective   SUBJECTIVE/OBJECTIVE:    HISTORY OF PRESENT ILLNESS:  This is a 44 y.o. male here for the following:  Patient Active Problem List    Diagnosis Date Noted    WD-Nonhealing surgical wound 2022    Hematoma of hip, right, initial encounter 2022    Postoperative complication of skin involving

## 2023-12-15 ENCOUNTER — HOSPITAL ENCOUNTER (EMERGENCY)
Age: 38
Discharge: HOME | End: 2023-12-15
Attending: STUDENT IN AN ORGANIZED HEALTH CARE EDUCATION/TRAINING PROGRAM
Payer: MEDICAID

## 2023-12-15 ENCOUNTER — HOSPITAL ENCOUNTER (OUTPATIENT)
Dept: GENERAL RADIOLOGY | Age: 38
Discharge: HOME | End: 2023-12-15
Payer: MEDICAID

## 2023-12-15 ENCOUNTER — HOSPITAL ENCOUNTER (OUTPATIENT)
Age: 38
Discharge: HOME | End: 2023-12-15
Payer: MEDICAID

## 2023-12-15 VITALS
DIASTOLIC BLOOD PRESSURE: 79 MMHG | RESPIRATION RATE: 18 BRPM | OXYGEN SATURATION: 97 % | HEART RATE: 76 BPM | SYSTOLIC BLOOD PRESSURE: 135 MMHG | TEMPERATURE: 97.5 F

## 2023-12-15 DIAGNOSIS — R11.2 NAUSEA AND VOMITING, UNSPECIFIED VOMITING TYPE: ICD-10-CM

## 2023-12-15 DIAGNOSIS — R19.7 DIARRHEA, UNSPECIFIED TYPE: ICD-10-CM

## 2023-12-15 DIAGNOSIS — J06.9 ACUTE UPPER RESPIRATORY INFECTION: Primary | ICD-10-CM

## 2023-12-15 DIAGNOSIS — M79.672 LEFT FOOT PAIN: ICD-10-CM

## 2023-12-15 LAB
BILIRUBIN URINE: NEGATIVE MG/DL
BLOOD, URINE: NEGATIVE
CLARITY: CLEAR
COLOR: YELLOW
COMMENT UA: ABNORMAL
GLUCOSE, URINE: NEGATIVE MG/DL
INFLUENZA A ANTIGEN: NOT DETECTED
INFLUENZA B ANTIGEN: NOT DETECTED
KETONES, URINE: ABNORMAL MG/DL
LEUKOCYTE ESTERASE, URINE: NEGATIVE
NITRITE URINE, QUANTITATIVE: NEGATIVE
PH, URINE: 6 (ref 5–8)
PROTEIN UA: NEGATIVE MG/DL
SARS-COV-2 RDRP RESP QL NAA+PROBE: NOT DETECTED
SOURCE: NORMAL
SPECIFIC GRAVITY UA: >1.03 (ref 1–1.03)
UROBILINOGEN, URINE: 0.2 MG/DL (ref 0.2–1)

## 2023-12-15 PROCEDURE — 6360000002 HC RX W HCPCS: Performed by: STUDENT IN AN ORGANIZED HEALTH CARE EDUCATION/TRAINING PROGRAM

## 2023-12-15 PROCEDURE — 99283 EMERGENCY DEPT VISIT LOW MDM: CPT

## 2023-12-15 PROCEDURE — 73630 X-RAY EXAM OF FOOT: CPT

## 2023-12-15 PROCEDURE — 81003 URINALYSIS AUTO W/O SCOPE: CPT

## 2023-12-15 PROCEDURE — 87635 SARS-COV-2 COVID-19 AMP PRB: CPT

## 2023-12-15 PROCEDURE — 87502 INFLUENZA DNA AMP PROBE: CPT

## 2023-12-15 RX ORDER — DEXAMETHASONE 2 MG/1
2 TABLET ORAL ONCE
Status: COMPLETED | OUTPATIENT
Start: 2023-12-15 | End: 2023-12-15

## 2023-12-15 RX ORDER — ACETAMINOPHEN 500 MG
1000 TABLET ORAL EVERY 6 HOURS PRN
Qty: 120 TABLET | Refills: 0 | Status: SHIPPED | OUTPATIENT
Start: 2023-12-15 | End: 2024-01-14

## 2023-12-15 RX ORDER — ONDANSETRON 4 MG/1
4 TABLET, FILM COATED ORAL 3 TIMES DAILY PRN
Qty: 12 TABLET | Refills: 0 | Status: SHIPPED | OUTPATIENT
Start: 2023-12-15

## 2023-12-15 RX ORDER — DEXAMETHASONE 4 MG/1
8 TABLET ORAL ONCE
Status: COMPLETED | OUTPATIENT
Start: 2023-12-15 | End: 2023-12-15

## 2023-12-15 RX ADMIN — DEXAMETHASONE 2 MG: 4 TABLET ORAL at 17:22

## 2023-12-15 RX ADMIN — DEXAMETHASONE 8 MG: 4 TABLET ORAL at 17:22

## 2023-12-15 NOTE — ED PROVIDER NOTES
requesting to be discharged since his gait is also sick at home. History from : Patient    Limitations to history : None    Patient was given the following medications:  Medications   dexamethasone (DECADRON) tablet 8 mg (8 mg Oral Given 12/15/23 1722)     And   dexamethasone (DECADRON) tablet 2 mg (2 mg Oral Given 12/15/23 1722)       Independent Imaging Interpretation by me: None    Chronic conditions affecting care: None    Discussion with Other Profesionals : None    Social Determinants : None    Records Reviewed : Other    last cardiology note, previous ED notes      Disposition Considerations (tests considered but not done, Shared Decision Making, Pt Expectation of Test or Tx.): Discharged with Tylenol, Zofran, PCP follow-up, return precautions  Appropriate for outpatient management      I am the Primary Clinician of Record. Clinical Impression:  1. Acute upper respiratory infection    2. Nausea and vomiting, unspecified vomiting type    3. Diarrhea, unspecified type      Disposition referral (if applicable): Maricruz Lambert DO  [address] [facility]  [place] [id number] [phone]    Schedule an appointment as soon as possible for a visit in 1 week  As needed    Disposition medications (if applicable):  New Prescriptions    ACETAMINOPHEN (TYLENOL) 500 MG TABLET    Take 2 tablets by mouth every 6 hours as needed for Pain or Fever    ONDANSETRON (ZOFRAN) 4 MG TABLET    Take 1 tablet by mouth 3 times daily as needed for Nausea or Vomiting     ED Provider Disposition Time  DISPOSITION Discharge - Pending Orders Complete 12/15/2023 04:55:14 PM      Discharged on stable condition    Comment: Please note this report has been produced using speech recognition software and may contain errors related to that system including errors in grammar, punctuation, and spelling, as well as words and phrases that may be inappropriate. Efforts were made to edit the dictations.         Alla Menendez MD  12/15/23 4114

## 2023-12-15 NOTE — DISCHARGE INSTRUCTIONS
-Take Zofran as needed for nausea  -Take Tylenol as needed for pain  -Follow-up with your primary care doctor if you not feel improvement in 1 week  -Follow the COVID isolation instructions attach if you are positive when you check on MyChart  -Come back to emergency department if you develop severe pain, severe vomiting, severe diarrhea, or if you are concerned

## 2024-01-02 ENCOUNTER — OFFICE VISIT (OUTPATIENT)
Dept: ORTHOPEDIC SURGERY | Age: 39
End: 2024-01-02
Payer: MEDICAID

## 2024-01-02 VITALS
WEIGHT: 216 LBS | HEIGHT: 73 IN | BODY MASS INDEX: 28.63 KG/M2 | HEART RATE: 71 BPM | RESPIRATION RATE: 11 BRPM | OXYGEN SATURATION: 98 %

## 2024-01-02 DIAGNOSIS — S66.912A STRAIN OF LEFT WRIST, INITIAL ENCOUNTER: ICD-10-CM

## 2024-01-02 DIAGNOSIS — G56.02 LEFT CARPAL TUNNEL SYNDROME: Primary | ICD-10-CM

## 2024-01-02 PROCEDURE — 99203 OFFICE O/P NEW LOW 30 MIN: CPT | Performed by: ORTHOPAEDIC SURGERY

## 2024-01-02 RX ORDER — TRIAMCINOLONE ACETONIDE 1 MG/G
CREAM TOPICAL
COMMUNITY
Start: 2024-01-01

## 2024-01-02 NOTE — PATIENT INSTRUCTIONS
Brace fitted today.  Continue weight-bearing as tolerated.  Continue range of motion exercises as instructed.  Ice and elevate as needed.  Tylenol or Motrin for pain.  Follow up as needed.    Patient Education        Carpal Tunnel Syndrome: Exercises  Introduction  Here are some examples of exercises for you to try. The exercises may be suggested for a condition or for rehabilitation. Start each exercise slowly. Ease off the exercises if you start to have pain.  You will be told when to start these exercises and which ones will work best for you.  Warm-up stretches  When you no longer have pain or numbness, you can do exercises to help prevent carpal tunnel syndrome from coming back. Do not do any stretch or movement that is uncomfortable or painful.  Rotate your wrist up, down, and from side to side. Repeat 4 times.  Stretch your fingers far apart. Relax them, and then stretch them again. Repeat 4 times.  Stretch your thumb by pulling it back gently, holding it, and then releasing it. Repeat 4 times.  How to do the exercises  Prayer stretch    Start with your palms together in front of your chest just below your chin.  Slowly lower your hands toward your waistline, keeping your hands close to your stomach and your palms together until you feel a mild to moderate stretch under your forearms.  Hold for at least 15 to 30 seconds. Repeat 2 to 4 times.  Wrist flexor stretch    Extend your arm in front of you with your palm up.  Bend your wrist, pointing your hand toward the floor.  With your other hand, gently bend your wrist farther until you feel a mild to moderate stretch in your forearm.  Hold for at least 15 to 30 seconds. Repeat 2 to 4 times.  Wrist extensor stretch    Extend the arm with the affected wrist in front of you and point your fingers toward the floor.  With your other hand, gently bend your wrist farther until you feel a mild to moderate stretch in your forearm.  Hold the stretch for at least 15 to 30

## 2024-01-02 NOTE — PROGRESS NOTES
Patient presents to the office with bilateral numbness and tingling in his hands. Pt stated his left is worse and he is left hand dominate . Pt stated his tingling sensations and difficulty gripping is getting worse and notices it the most in the morning. Pt stated he has not had any treatments.        EMG completed: 12/5/23    IMPRESSION:                  1.  Abnormal EMG.  There is a significant median neuropathy at the left wrist (left carpal tunnel syndrome).  Electrical severity is mild to moderate.                 2.  Electrically minimal right CTS.                 3.  No evidence of a concurrent cervical spinal nerve root lesion (radiculopathy), plexopathy, generalized neuropathy or primary muscle disease.
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
## 2024-01-05 ASSESSMENT — ENCOUNTER SYMPTOMS
VOMITING: 0
CHEST TIGHTNESS: 0
SHORTNESS OF BREATH: 0
COLOR CHANGE: 0
EYE PAIN: 0
EYE REDNESS: 0
WHEEZING: 0

## 2024-02-13 ENCOUNTER — OFFICE VISIT (OUTPATIENT)
Dept: ORTHOPEDIC SURGERY | Age: 39
End: 2024-02-13
Payer: MEDICAID

## 2024-02-13 VITALS — HEART RATE: 74 BPM | BODY MASS INDEX: 28.63 KG/M2 | HEIGHT: 73 IN | WEIGHT: 216 LBS | OXYGEN SATURATION: 94 %

## 2024-02-13 DIAGNOSIS — G56.02 LEFT CARPAL TUNNEL SYNDROME: Primary | ICD-10-CM

## 2024-02-13 DIAGNOSIS — S66.912A STRAIN OF LEFT WRIST, INITIAL ENCOUNTER: ICD-10-CM

## 2024-02-13 PROCEDURE — 99212 OFFICE O/P EST SF 10 MIN: CPT | Performed by: ORTHOPAEDIC SURGERY

## 2024-02-13 NOTE — PATIENT INSTRUCTIONS
Continue weight-bearing as tolerated.  Continue range of motion exercises as instructed.  Ice and elevate as needed.  Tylenol or Motrin for pain.   Follow up as needed
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
## 2024-04-07 NOTE — FLOWSHEET NOTE
Outpatient Physical Therapy  [place]           [x] Phone: [phone]   Fax: [phone]  [place]           [] Phone: [phone]   Fax: [phone]        Physical Therapy Daily Treatment Note  Date:  10/22/2021    Patient Name:  Fabian Arriaza    :  1985  MRN: [id number]  Restrictions/Precautions:    Diagnosis:   Diagnosis: P96.210F  Date of Injury/Surgery: 21  Treatment Diagnosis: Treatment Diagnosis: Shoulder pain/strain    Insurance/Certification information: PT Insurance Information: [id number] (10 visits by 21)  Referring Physician:  Referring Practitioner: Dr. Tolu Gee  Next Doctor Visit:   10/27/21  Plan of care signed (Y/N):  n  Outcome Measure:   Visit# / total visits:   2/10  Pain level: 4-5/10 at rest   Goals:     Patient goals : decrease pain, return to work. Long term goals  Time Frame for Long term goals : 5 weeks  Long term goal 1: I in home program.  Long term goal 2: reach overhead with minimal pain. Long term goal 3: reach across body with minimal pain. Long term goal 4: lift/carry children with minimal pain. Summary of Evaluation: Assessment: Pt presents with complaints of R shoulder pain onset 21 when he fell on R elbow/forearm while running from a dog. He relates continued pain and difficulty lifting, reaching with his RUE. 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 He has pain lmited ROM at R shoulder, pain limited strength, tenderness to palpation at distal clavicle, AC joint, supra, infraspinatus, biceps groove. MRI revealed:No rotator cuff tear identified. Mild infraspinatus and subscapularistendinosis. Intact long head biceps tendon. Mild AC joint degenerative changes with trace subacromial subdeltoid bursalfluid. No rotator cuff tear identified. Subjective: The pt stated the rolling on the ball is really sore on the right muscles like he was suppose to do.    Pt stated he has lumbar spine pain and neuropathy in the left leg mildly. The left hip is numb. Pt stated with resting muscles the pain increases. Any changes in Ambulatory Summary Sheet? None        Objective:    COVID screening questions were asked and patient attested that there had been no contact or symptoms    Painful arc :   70 to 90 degrees of right shoulder motion scaption, flexion and abduction. Exercises: (No more than 4 columns)   Exercise/Equipment Date 10/20/21 Date  10/22/21 #2 Date           WARM UP           UBE 1'fwd/1'bwd          TABLE      AAROM w distraction and mobilization, flexion, scaption, ER --    SL ER  SL not resisted 2x10    Upper back stretch 20 sec x 2 -                   STANDING      Rows  RTB 2 x 10     flexion, scaption emphasis on eccentric initially  flxn YTB 2x10    scaption-at wall ecc.lowers   1# 2 x10    shld extension  Cane 2 x10    Serratus wall climb  RTB from chest height to tolerated x7 reps                           PROPRIOCEPTION      Rhythmic stabilization  Supine R, ABC box letters   x1 set                            MODALITIES      vaso  10'              Other Therapeutic Activities/Education:        Home Exercise Program:    Access Code: [email or web address]  URL: [email or web address]  Date: 10/20/2021  Prepared by: Camilo Mix    Exercises  Standing Lower Cervical and Upper Thoracic Stretch - 3 x daily - 7 x weekly - 1 sets - 3 reps - 20 hold  Seated Shoulder Inferior Glide - 3 x daily - 7 x weekly - 1 sets - 3 reps - 20 hold  Self trigger point release with tennis ball - 1 x daily - 7 x weekly - 1 sets - 20 hold      Manual Treatments:    Modalities:  VASO x 10   Min. , mod pressure at 34* cold sitting at right shoulder. Communication with other providers:  None today      Assessment:  (Response towards treatment session) (Pain Rating)  Pt self limiting due to the range of pain from the 70 to 100 degrees of scaption abduction and flexion.   Clicking and popping noted with reps of right shoulder flexion,scaption and abduction. Stabiity in the shoulder lacking at the range of motion mid ranges as well as pain. Pt noted shoulder pain 4-5/10 after tx today. Plan for Next Session:  Ongoing strengthening and stability right shoulder complex per POC. Time In / Time Out:    1415/1500  35'/'45. (10') VASO , and (35') TE      If BWC Please Indicate Time In/Out  CPT Time IN Time Out Total time   TE 1415 1450 35'   VASO 1450 1500 10'               Timed Code/Total Treatment Minutes:  35/45 (see above for billed)      Next Progress Note due:        Plan of Care Interventions:  [x] Therapeutic Exercise  [] Modalities:  [x] Therapeutic Activity     [] Ultrasound  [] Estim  [] Gait Training      [] Cervical Traction [] Lumbar Traction  [x] Neuromuscular Re-education    [] Cold/hotpack [] Iontophoresis   [x] Instruction in HEP      [] Vasopneumatic   [] Dry Needling    [x] Manual Therapy               [] Aquatic Therapy              Electronically signed by:   Alisa Doty PTA 10/22/2021, 7:09 AM 23-Mar-2024 12:20 07-Apr-2024 16:35

## 2024-04-28 ENCOUNTER — HOSPITAL ENCOUNTER (EMERGENCY)
Age: 39
Discharge: HOME | End: 2024-04-28
Attending: EMERGENCY MEDICINE
Payer: MEDICAID

## 2024-04-28 VITALS
OXYGEN SATURATION: 98 % | DIASTOLIC BLOOD PRESSURE: 81 MMHG | RESPIRATION RATE: 20 BRPM | HEART RATE: 76 BPM | SYSTOLIC BLOOD PRESSURE: 130 MMHG | TEMPERATURE: 97.6 F

## 2024-04-28 DIAGNOSIS — R10.9 ABDOMINAL PAIN, UNSPECIFIED ABDOMINAL LOCATION: Primary | ICD-10-CM

## 2024-04-28 DIAGNOSIS — R19.7 DIARRHEA, UNSPECIFIED TYPE: ICD-10-CM

## 2024-04-28 DIAGNOSIS — Z02.89 ENCOUNTER TO OBTAIN EXCUSE FROM WORK: ICD-10-CM

## 2024-04-28 PROCEDURE — 99283 EMERGENCY DEPT VISIT LOW MDM: CPT

## 2024-04-28 RX ORDER — DICYCLOMINE HCL 20 MG
20 TABLET ORAL 4 TIMES DAILY
Qty: 40 TABLET | Refills: 0 | Status: SHIPPED | OUTPATIENT
Start: 2024-04-28

## 2024-04-28 RX ORDER — ONDANSETRON 4 MG/1
4 TABLET, ORALLY DISINTEGRATING ORAL 3 TIMES DAILY PRN
Qty: 21 TABLET | Refills: 0 | Status: SHIPPED | OUTPATIENT
Start: 2024-04-28

## 2024-04-28 RX ORDER — FAMOTIDINE 20 MG/1
20 TABLET, FILM COATED ORAL 2 TIMES DAILY
Qty: 14 TABLET | Refills: 0 | Status: SHIPPED | OUTPATIENT
Start: 2024-04-28

## 2024-04-28 RX ORDER — ACETAMINOPHEN 325 MG/1
650 TABLET ORAL EVERY 6 HOURS PRN
Qty: 120 TABLET | Refills: 3 | Status: SHIPPED | OUTPATIENT
Start: 2024-04-28

## 2024-04-28 RX ORDER — NAPROXEN 500 MG/1
500 TABLET ORAL 2 TIMES DAILY
Qty: 60 TABLET | Refills: 0 | Status: SHIPPED | OUTPATIENT
Start: 2024-04-28

## 2024-05-16 ENCOUNTER — OFFICE VISIT (OUTPATIENT)
Dept: INTERNAL MEDICINE CLINIC | Age: 39
End: 2024-05-16
Payer: MEDICAID

## 2024-05-16 ENCOUNTER — TELEPHONE (OUTPATIENT)
Dept: INTERNAL MEDICINE CLINIC | Age: 39
End: 2024-05-16

## 2024-05-16 VITALS
OXYGEN SATURATION: 96 % | HEART RATE: 68 BPM | HEIGHT: 73 IN | BODY MASS INDEX: 29.55 KG/M2 | DIASTOLIC BLOOD PRESSURE: 80 MMHG | SYSTOLIC BLOOD PRESSURE: 120 MMHG | WEIGHT: 223 LBS

## 2024-05-16 DIAGNOSIS — G89.29 CHRONIC BILATERAL LOW BACK PAIN, UNSPECIFIED WHETHER SCIATICA PRESENT: ICD-10-CM

## 2024-05-16 DIAGNOSIS — M51.36 DDD (DEGENERATIVE DISC DISEASE), LUMBAR: ICD-10-CM

## 2024-05-16 DIAGNOSIS — G89.29 CHRONIC RIGHT SHOULDER PAIN: ICD-10-CM

## 2024-05-16 DIAGNOSIS — M54.50 CHRONIC BILATERAL LOW BACK PAIN, UNSPECIFIED WHETHER SCIATICA PRESENT: ICD-10-CM

## 2024-05-16 DIAGNOSIS — L72.3 SEBACEOUS CYST: ICD-10-CM

## 2024-05-16 DIAGNOSIS — E78.1 HYPERTRIGLYCERIDEMIA: Primary | ICD-10-CM

## 2024-05-16 DIAGNOSIS — M25.511 CHRONIC RIGHT SHOULDER PAIN: ICD-10-CM

## 2024-05-16 PROCEDURE — 99214 OFFICE O/P EST MOD 30 MIN: CPT | Performed by: FAMILY MEDICINE

## 2024-05-16 RX ORDER — GEMFIBROZIL 600 MG/1
600 TABLET, FILM COATED ORAL 2 TIMES DAILY
Qty: 60 TABLET | Refills: 5 | Status: SHIPPED | OUTPATIENT
Start: 2024-05-16

## 2024-05-16 RX ORDER — TIZANIDINE 4 MG/1
4 TABLET ORAL 3 TIMES DAILY PRN
Qty: 30 TABLET | Refills: 0 | Status: SHIPPED | OUTPATIENT
Start: 2024-05-16

## 2024-05-16 ASSESSMENT — ENCOUNTER SYMPTOMS
COUGH: 0
ABDOMINAL PAIN: 0
SHORTNESS OF BREATH: 0
BACK PAIN: 1

## 2024-05-16 ASSESSMENT — PATIENT HEALTH QUESTIONNAIRE - PHQ9
10. IF YOU CHECKED OFF ANY PROBLEMS, HOW DIFFICULT HAVE THESE PROBLEMS MADE IT FOR YOU TO DO YOUR WORK, TAKE CARE OF THINGS AT HOME, OR GET ALONG WITH OTHER PEOPLE: NOT DIFFICULT AT ALL
5. POOR APPETITE OR OVEREATING: NOT AT ALL
6. FEELING BAD ABOUT YOURSELF - OR THAT YOU ARE A FAILURE OR HAVE LET YOURSELF OR YOUR FAMILY DOWN: NOT AT ALL
SUM OF ALL RESPONSES TO PHQ QUESTIONS 1-9: 0
SUM OF ALL RESPONSES TO PHQ QUESTIONS 1-9: 0
3. TROUBLE FALLING OR STAYING ASLEEP: NOT AT ALL
9. THOUGHTS THAT YOU WOULD BE BETTER OFF DEAD, OR OF HURTING YOURSELF: NOT AT ALL
7. TROUBLE CONCENTRATING ON THINGS, SUCH AS READING THE NEWSPAPER OR WATCHING TELEVISION: NOT AT ALL
2. FEELING DOWN, DEPRESSED OR HOPELESS: NOT AT ALL
1. LITTLE INTEREST OR PLEASURE IN DOING THINGS: NOT AT ALL
4. FEELING TIRED OR HAVING LITTLE ENERGY: NOT AT ALL
SUM OF ALL RESPONSES TO PHQ9 QUESTIONS 1 & 2: 0
SUM OF ALL RESPONSES TO PHQ QUESTIONS 1-9: 0
8. MOVING OR SPEAKING SO SLOWLY THAT OTHER PEOPLE COULD HAVE NOTICED. OR THE OPPOSITE, BEING SO FIGETY OR RESTLESS THAT YOU HAVE BEEN MOVING AROUND A LOT MORE THAN USUAL: NOT AT ALL
SUM OF ALL RESPONSES TO PHQ QUESTIONS 1-9: 0

## 2024-05-16 NOTE — PROGRESS NOTES
Catarino Michel (:  1985) is a 38 y.o. male,established patient, here for evaluation of the following chief complaint(s):  Back Pain and Shoulder Pain (Right shoulder pain )      Assessment & Plan   ASSESSMENT/PLAN:  1. Hypertriglyceridemia  - gemfibrozil (LOPID) 600 MG tablet; Take 1 tablet by mouth 2 times daily  Dispense: 60 tablet; Refill: 5    2. Chronic right shoulder pain  - XR SHOULDER RIGHT (MIN 2 VIEWS); Future    3. DDD (degenerative disc disease), lumbar  - MRI LUMBAR SPINE WO CONTRAST; Future  Start:  - tiZANidine (ZANAFLEX) 4 MG tablet; Take 1 tablet by mouth 3 times daily as needed (muscle spasm)  Dispense: 30 tablet; Refill: 0  ADR's explained    4. Chronic bilateral low back pain, unspecified whether sciatica present  -MRI LUMBAR SPINE WO CONTRAST; Future  Start:  - tiZANidine (ZANAFLEX) 4 MG tablet; Take 1 tablet by mouth 3 times daily as needed (muscle spasm)  Dispense: 30 tablet; Refill: 0  ADR's explained  Keep f/u with pain management    5. Sebaceous cyst, back   He will monitor    Medications reconciled and discussed with the patient  Return in about 6 months (around 2024) for HLD.       22 CT Lumbar Spine WO Contrast  IMPRESSION:  No acute fracture or traumatic malalignment.     Multilevel degenerative disc and facet disease as described above..    Lab Results   Component Value Date    CHOL 189 10/30/2023     Lab Results   Component Value Date    TRIG 474 (H) 10/30/2023     Lab Results   Component Value Date    HDL 33 (L) 10/30/2023     Lab Results   Component Value Date    LDLDIRECT 110 (H) 10/30/2023     Lab Results   Component Value Date    LABA1C 4.7 2023     Lab Results   Component Value Date    EAG 88.2 2023     Lab Results   Component Value Date     2023    K 3.7 2023     2023    CO2 21 2023    BUN 13 2023    CREATININE 1.2 2023    GLUCOSE 92 2023    CALCIUM 9.6 2023    BILITOT 0.5 10/30/2023

## 2024-05-22 ENCOUNTER — HOSPITAL ENCOUNTER (OUTPATIENT)
Age: 39
Discharge: HOME | End: 2024-05-22
Attending: PODIATRIST
Payer: MEDICAID

## 2024-05-22 ENCOUNTER — HOSPITAL ENCOUNTER (OUTPATIENT)
Dept: MRI IMAGING | Age: 39
Discharge: HOME | End: 2024-05-22
Attending: PODIATRIST
Payer: MEDICAID

## 2024-05-22 ENCOUNTER — HOSPITAL ENCOUNTER (OUTPATIENT)
Dept: GENERAL RADIOLOGY | Age: 39
Discharge: HOME | End: 2024-05-22
Attending: PODIATRIST
Payer: MEDICAID

## 2024-05-22 ENCOUNTER — APPOINTMENT (OUTPATIENT)
Dept: MRI IMAGING | Age: 39
End: 2024-05-22
Attending: PODIATRIST
Payer: MEDICAID

## 2024-05-22 DIAGNOSIS — M25.511 CHRONIC RIGHT SHOULDER PAIN: ICD-10-CM

## 2024-05-22 DIAGNOSIS — G89.29 CHRONIC RIGHT SHOULDER PAIN: ICD-10-CM

## 2024-05-22 DIAGNOSIS — M72.2 PLANTAR FASCIAL FIBROMATOSIS: ICD-10-CM

## 2024-05-22 PROCEDURE — 73721 MRI JNT OF LWR EXTRE W/O DYE: CPT

## 2024-05-22 PROCEDURE — 73718 MRI LOWER EXTREMITY W/O DYE: CPT

## 2024-05-22 PROCEDURE — 73030 X-RAY EXAM OF SHOULDER: CPT

## 2024-06-03 ENCOUNTER — HOSPITAL ENCOUNTER (EMERGENCY)
Age: 39
Discharge: LEFT BEFORE DISCHARGE | End: 2024-06-03

## 2024-06-03 VITALS
BODY MASS INDEX: 29.95 KG/M2 | DIASTOLIC BLOOD PRESSURE: 85 MMHG | WEIGHT: 226 LBS | TEMPERATURE: 98.3 F | RESPIRATION RATE: 19 BRPM | HEIGHT: 73 IN | HEART RATE: 95 BPM | SYSTOLIC BLOOD PRESSURE: 128 MMHG | OXYGEN SATURATION: 97 %

## 2024-06-03 DIAGNOSIS — S61.219A LACERATION OF FINGER WITHOUT DAMAGE TO NAIL, FOREIGN BODY PRESENCE UNSPECIFIED, UNSPECIFIED FINGER, UNSPECIFIED LATERALITY, INITIAL ENCOUNTER: Primary | ICD-10-CM

## 2024-06-03 DIAGNOSIS — Z53.21 PATIENT LEFT WITHOUT BEING SEEN: ICD-10-CM

## 2024-06-03 ASSESSMENT — PAIN SCALES - GENERAL: PAINLEVEL_OUTOF10: 6

## 2024-06-03 ASSESSMENT — LIFESTYLE VARIABLES
HOW OFTEN DO YOU HAVE A DRINK CONTAINING ALCOHOL: NEVER
HOW MANY STANDARD DRINKS CONTAINING ALCOHOL DO YOU HAVE ON A TYPICAL DAY: PATIENT DOES NOT DRINK

## 2024-06-03 ASSESSMENT — PAIN - FUNCTIONAL ASSESSMENT: PAIN_FUNCTIONAL_ASSESSMENT: 0-10

## 2024-06-04 NOTE — ED PROVIDER NOTES
I never physically saw this patient-I was asked by nursing team to see him and he was in queue to be seen by the needed of leaving prior to my evaluation.     Francisco Delgado PA  06/03/24 0732

## 2024-08-25 ENCOUNTER — APPOINTMENT (OUTPATIENT)
Dept: GENERAL RADIOLOGY | Age: 39
End: 2024-08-25
Payer: COMMERCIAL

## 2024-08-25 ENCOUNTER — HOSPITAL ENCOUNTER (EMERGENCY)
Age: 39
Discharge: HOME | End: 2024-08-25
Attending: STUDENT IN AN ORGANIZED HEALTH CARE EDUCATION/TRAINING PROGRAM
Payer: COMMERCIAL

## 2024-08-25 VITALS
DIASTOLIC BLOOD PRESSURE: 91 MMHG | SYSTOLIC BLOOD PRESSURE: 125 MMHG | RESPIRATION RATE: 19 BRPM | OXYGEN SATURATION: 97 % | HEART RATE: 102 BPM | TEMPERATURE: 98.5 F

## 2024-08-25 DIAGNOSIS — J06.9 VIRAL URI WITH COUGH: Primary | ICD-10-CM

## 2024-08-25 DIAGNOSIS — U07.1 COVID-19 VIRUS DETECTED: ICD-10-CM

## 2024-08-25 LAB
SARS-COV-2 RDRP RESP QL NAA+PROBE: DETECTED
SOURCE: ABNORMAL

## 2024-08-25 PROCEDURE — 71046 X-RAY EXAM CHEST 2 VIEWS: CPT

## 2024-08-25 PROCEDURE — 87635 SARS-COV-2 COVID-19 AMP PRB: CPT

## 2024-08-25 PROCEDURE — 99284 EMERGENCY DEPT VISIT MOD MDM: CPT

## 2024-08-25 PROCEDURE — 87081 CULTURE SCREEN ONLY: CPT

## 2024-08-25 PROCEDURE — 87430 STREP A AG IA: CPT

## 2024-08-25 RX ORDER — BENZONATATE 200 MG/1
200 CAPSULE ORAL 3 TIMES DAILY PRN
Qty: 21 CAPSULE | Refills: 0 | Status: SHIPPED | OUTPATIENT
Start: 2024-08-25 | End: 2024-09-01

## 2024-08-25 RX ORDER — FLUTICASONE PROPIONATE 50 MCG
2 SPRAY, SUSPENSION (ML) NASAL DAILY
Qty: 16 G | Refills: 0 | Status: SHIPPED | OUTPATIENT
Start: 2024-08-25

## 2024-08-25 NOTE — DISCHARGE INSTRUCTIONS
Your COVID-19 test came back positive.  See attached isolation instructions.    Your chest x-ray did not show any pneumonia.      Your strep test was negative

## 2024-08-25 NOTE — ED PROVIDER NOTES
listed is current ROS at time of my eval:  systems reviewed and negative except as above.     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     L5, S1    Depression     Hyperlipidemia     Neuropathy 2020    Seizures (HCC)     WD-Complicated open wound of right hip 5/18/2022     Past Surgical History:   Procedure Laterality Date    EYE SURGERY      for amblyopia    LEG SURGERY Right 5/9/2022    RIGHT THIGH DEBRIDEMENT INCISION AND DRAINAGE AND WASHOUT AND WOUND VAC PLACEMENT performed by Philip De La Torre DO at [facility] OR    SKIN BIOPSY Right 5/5/2022    RIGHT LATERAL THIGH LESION BIOPSY EXCISION performed by Philip De La Torre DO at [facility] OR    TONSILLECTOMY AND ADENOIDECTOMY      TYMPANOSTOMY TUBE PLACEMENT       Family History   Problem Relation Age of Onset    Ovarian Cancer Mother     Bipolar Disorder Mother     Heart Attack Father     Lung Cancer Father     Other Brother         cerebral palsy    Diabetes Maternal Grandmother     Heart Disease Maternal Grandfather     Hypertension Paternal Grandmother     Diabetes Paternal Grandmother      Social History     Socioeconomic History    Marital status: Legally      Spouse name: Not on file    Number of children: 3    Years of education: Not on file    Highest education level: Not on file   Occupational History    Occupation: [employer]   Tobacco Use    Smoking status: Former     Average packs/day: 0.3 packs/day for 2.0 years (0.5 ttl pk-yrs)     Types: Cigarettes     Start date: 2001    Smokeless tobacco: Never   Vaping Use    Vaping status: Never Used   Substance and Sexual Activity    Alcohol use: Not Currently     Comment: occasional. caffeine: 2 mt dew a day, 1 cup coffee    Drug use: Not Currently     Types: Marijuana (Weed), Cocaine     Comment: in the past- marijuana Many years ago    Sexual activity: Yes     Partners:

## 2024-08-28 LAB
CULTURE: NORMAL
Lab: NORMAL
SPECIMEN: NORMAL
STREP A DIRECT SCREEN: NEGATIVE

## (undated) DEVICE — GOWN,REINFORCE,POLY,SIRUS,XLNG/XLG: Brand: MEDLINE

## (undated) DEVICE — DRESSING WND VAC SM GRANUFOAM SENSATRAC

## (undated) DEVICE — TOWEL,OR,DSP,ST,BLUE,STD,6/PK,12PK/CS: Brand: MEDLINE

## (undated) DEVICE — GLOVE ORANGE PI 8   MSG9080

## (undated) DEVICE — MARKER SURG SKIN UTIL REGULAR/FINE 2 TIP W/ RUL AND 9 LBL

## (undated) DEVICE — GLOVE SURG SZ 7 CRM LTX FREE POLYISOPRENE POLYMER BEAD ANTI

## (undated) DEVICE — RESERVOIR,SUCTION,100CC,SILICONE: Brand: MEDLINE

## (undated) DEVICE — DRESSING WND VAC XLG GRANUFOAM SENSATRAC

## (undated) DEVICE — SPONGE LAP W18XL18IN WHT COT 4 PLY FLD STRUNG RADPQ DISP ST

## (undated) DEVICE — TUBING, SUCTION, 3/16" X 10', STRAIGHT: Brand: MEDLINE

## (undated) DEVICE — GAUZE,SPONGE,4"X4",16PLY,XRAY,STRL,LF: Brand: MEDLINE

## (undated) DEVICE — SUTURE VCRL SZ 2-0 L27IN ABSRB VLT L26MM SH 1/2 CIR J317H

## (undated) DEVICE — TUBING SUCT L10FT DIA0.25IN UNIV W/ SCALLOPED FEM CONN

## (undated) DEVICE — PACK,BASIC,SIRUS,V: Brand: MEDLINE

## (undated) DEVICE — ELECTRODE ES AD CRDLSS PT RET REM POLYHESIVE

## (undated) DEVICE — GLOVE SURG SZ 7 L12IN FNGR THK79MIL GRN LTX FREE

## (undated) DEVICE — BANDAGE,GAUZE,BULKEE II,4.5"X4.1YD,STRL: Brand: MEDLINE

## (undated) DEVICE — SUTURE VCRL SZ 3-0 L27IN ABSRB VLT L26MM SH 1/2 CIR J316H

## (undated) DEVICE — ADHESIVE LIQ H2O INSOLUBLE 3 CC LO RISK N STN MASTISOL

## (undated) DEVICE — PENCIL ES CRD L10FT HND SWCHING ROCK SWCH W/ EDGE COAT BLDE

## (undated) DEVICE — SHEET,DRAPE,53X77,STERILE: Brand: MEDLINE

## (undated) DEVICE — NEEDLE HYPO 23GA L1.5IN TURQ POLYPR HUB S STL THN WALL IM

## (undated) DEVICE — ELECTRODE ES L2.75IN S STL INSUL BLDE W/ SL EDGE

## (undated) DEVICE — SHEET, T, LAPAROTOMY, STERILE: Brand: MEDLINE

## (undated) DEVICE — SYRINGE 20ML LL S/C 50

## (undated) DEVICE — SYRINGE IRRIG 60ML SFT PLIABLE BLB EZ TO GRP 1 HND USE W/

## (undated) DEVICE — INTENDED FOR TISSUE SEPARATION, AND OTHER PROCEDURES THAT REQUIRE A SHARP SURGICAL BLADE TO PUNCTURE OR CUT.: Brand: BARD-PARKER ® STAINLESS STEEL BLADES

## (undated) DEVICE — Device

## (undated) DEVICE — PREMIUM WET SKIN PREP TRAY: Brand: MEDLINE INDUSTRIES, INC.

## (undated) DEVICE — SYRINGE MED 10ML LUERLOCK TIP W/O SFTY DISP

## (undated) DEVICE — AGENT HEMSTAT 3GM OXIDIZED REGENERATED CELOS ABSRB FOR CONT (ORDER MULTIPLES OF 5EA)

## (undated) DEVICE — SPONGE GZ W4XL8IN COT WVN 12 PLY

## (undated) DEVICE — COUNTER NDL 30 COUNT FOAM STRP SGL MAG

## (undated) DEVICE — GOWN,SIRUS,POLYRNF,BRTHSLV,XLN/XL,20/CS: Brand: MEDLINE

## (undated) DEVICE — CONNECTOR CIRCUIT ANESTH 15MM/22MM WYE W/O PORT HUDSON RCI

## (undated) DEVICE — SUTURE PERMA HND 2-0 L18IN NONABSORBABLE BLK X-1 L22IN 1/2 737G

## (undated) DEVICE — NEEDLE HYPO 25GA L1.5IN BLU POLYPR HUB S STL REG BVL STR

## (undated) DEVICE — ADHESIVE SKIN CLSR 0.7ML TOP DERMBND ADV

## (undated) DEVICE — YANKAUER,FLEXIBLE HANDLE,REGLR CAPACITY: Brand: MEDLINE INDUSTRIES, INC.

## (undated) DEVICE — GLOVE SURG SZ 65 L12IN FNGR THK79MIL GRN LTX FREE

## (undated) DEVICE — SWAB CULT SGL AMIES W/O CHAR FOR THRT VAG SKIN HRT CULTSWAB

## (undated) DEVICE — DRAIN SURG 15FR SIL RND CHN W/ TRCR FULL FLUT DBL WRP TRAD

## (undated) DEVICE — GLOVE SURG SZ 75 L12IN THK75MIL DK GRN LTX FREE

## (undated) DEVICE — SUTURE VCRL SZ 2-0 L27IN ABSRB UD L26MM SH 1/2 CIR J417H

## (undated) DEVICE — FAN SPRAY KIT: Brand: PULSAVAC®

## (undated) DEVICE — 1L FLEX ADVANTAGE KIT W/9': Brand: FLEX ADVANTAGE

## (undated) DEVICE — SUTURE VCRL SZ 3-0 L27IN ABSRB UD L26MM SH 1/2 CIR J416H